# Patient Record
Sex: FEMALE | Race: BLACK OR AFRICAN AMERICAN | Employment: OTHER | URBAN - METROPOLITAN AREA
[De-identification: names, ages, dates, MRNs, and addresses within clinical notes are randomized per-mention and may not be internally consistent; named-entity substitution may affect disease eponyms.]

---

## 2017-01-06 ENCOUNTER — HOSPITAL ENCOUNTER (OUTPATIENT)
Dept: LAB | Age: 72
Discharge: HOME | End: 2017-01-06

## 2017-01-06 LAB
INR PPP: 1.2 (ref 0.9–1.2)
PROTHROMBIN TIME: 13.1 SEC (ref 9.6–12)

## 2017-01-06 PROCEDURE — 85610 PROTHROMBIN TIME: CPT | Performed by: GENERAL PRACTICE

## 2017-01-17 ENCOUNTER — HOSPITAL ENCOUNTER (OUTPATIENT)
Dept: LAB | Age: 72
Discharge: HOME | End: 2017-01-17

## 2017-01-17 LAB
INR PPP: 1.2 (ref 0.9–1.2)
PROTHROMBIN TIME: 13.4 SEC (ref 9.6–12)

## 2017-01-17 PROCEDURE — 85610 PROTHROMBIN TIME: CPT

## 2017-02-08 PROBLEM — R53.81 DEBILITY: Status: ACTIVE | Noted: 2017-02-08

## 2017-02-15 ENCOUNTER — HOSPITAL ENCOUNTER (OUTPATIENT)
Dept: LAB | Age: 72
Discharge: HOME | End: 2017-02-15
Payer: MEDICARE

## 2017-02-15 DIAGNOSIS — I26.09 OTHER ACUTE PULMONARY EMBOLISM WITH ACUTE COR PULMONALE (HCC): ICD-10-CM

## 2017-02-15 DIAGNOSIS — I26.09 OTHER PULMONARY EMBOLISM WITH ACUTE COR PULMONALE, UNSPECIFIED CHRONICITY (HCC): ICD-10-CM

## 2017-02-15 DIAGNOSIS — Z79.01 ANTICOAGULATED ON COUMADIN: ICD-10-CM

## 2017-02-15 DIAGNOSIS — I26.99 OTHER PULMONARY EMBOLISM WITHOUT ACUTE COR PULMONALE, UNSPECIFIED CHRONICITY (HCC): ICD-10-CM

## 2017-02-15 DIAGNOSIS — I82.402 DEEP VEIN THROMBOSIS (DVT) OF LEFT LOWER EXTREMITY, UNSPECIFIED CHRONICITY, UNSPECIFIED VEIN (HCC): ICD-10-CM

## 2017-02-15 DIAGNOSIS — R68.89: ICD-10-CM

## 2017-02-15 LAB
ALBUMIN SERPL BCP-MCNC: 3.2 G/DL (ref 3.2–4.6)
ALBUMIN/GLOB SERPL: 0.7 {RATIO} (ref 1.2–3.5)
ALP SERPL-CCNC: 81 U/L (ref 50–136)
ALT SERPL-CCNC: 12 U/L (ref 12–65)
ANION GAP BLD CALC-SCNC: 9 MMOL/L (ref 7–16)
AST SERPL W P-5'-P-CCNC: 19 U/L (ref 15–37)
BASOPHILS # BLD AUTO: 0 K/UL (ref 0–0.2)
BASOPHILS # BLD: 0 % (ref 0–2)
BILIRUB SERPL-MCNC: 0.3 MG/DL (ref 0.2–1.1)
BUN SERPL-MCNC: 11 MG/DL (ref 8–23)
CALCIUM SERPL-MCNC: 8.6 MG/DL (ref 8.3–10.4)
CHLORIDE SERPL-SCNC: 102 MMOL/L (ref 98–107)
CO2 SERPL-SCNC: 28 MMOL/L (ref 23–32)
CREAT SERPL-MCNC: 0.84 MG/DL (ref 0.6–1)
DIFFERENTIAL METHOD BLD: ABNORMAL
EOSINOPHIL # BLD: 0.1 K/UL (ref 0–0.8)
EOSINOPHIL NFR BLD: 1 % (ref 0.5–7.8)
ERYTHROCYTE [DISTWIDTH] IN BLOOD BY AUTOMATED COUNT: 14 % (ref 11.9–14.6)
GLOBULIN SER CALC-MCNC: 4.5 G/DL (ref 2.3–3.5)
GLUCOSE SERPL-MCNC: 157 MG/DL (ref 65–100)
HCT VFR BLD AUTO: 39.6 % (ref 35.8–46.3)
HGB BLD-MCNC: 12.3 G/DL (ref 11.7–15.4)
INR PPP: 2.2 (ref 0.9–1.2)
LYMPHOCYTES # BLD AUTO: 26 % (ref 13–44)
LYMPHOCYTES # BLD: 2.3 K/UL (ref 0.5–4.6)
MCH RBC QN AUTO: 28.2 PG (ref 26.1–32.9)
MCHC RBC AUTO-ENTMCNC: 31.1 G/DL (ref 31.4–35)
MCV RBC AUTO: 90.8 FL (ref 79.6–97.8)
MONOCYTES # BLD: 1.1 K/UL (ref 0.1–1.3)
MONOCYTES NFR BLD AUTO: 12 % (ref 4–12)
NEUTS SEG # BLD: 5.6 K/UL (ref 1.7–8.2)
NEUTS SEG NFR BLD AUTO: 61 % (ref 43–78)
NRBC # BLD: 0 K/UL (ref 0–0.2)
NRBC BLD-RTO: 0 PER 100 WBC (ref 0–2)
PLATELET # BLD AUTO: 224 K/UL (ref 150–450)
PMV BLD AUTO: 11.3 FL (ref 10.8–14.1)
POTASSIUM SERPL-SCNC: 3.4 MMOL/L (ref 3.5–5.1)
PROT SERPL-MCNC: 7.7 G/DL (ref 6.3–8.2)
PROTHROMBIN TIME: 22 SEC (ref 9.6–12)
RBC # BLD AUTO: 4.36 M/UL (ref 4.05–5.25)
SODIUM SERPL-SCNC: 139 MMOL/L (ref 136–145)
WBC # BLD AUTO: 9.2 K/UL (ref 4.3–11.1)

## 2017-02-15 PROCEDURE — 85025 COMPLETE CBC W/AUTO DIFF WBC: CPT | Performed by: INTERNAL MEDICINE

## 2017-02-15 PROCEDURE — 80053 COMPREHEN METABOLIC PANEL: CPT | Performed by: INTERNAL MEDICINE

## 2017-02-15 PROCEDURE — 85610 PROTHROMBIN TIME: CPT | Performed by: INTERNAL MEDICINE

## 2017-02-15 PROCEDURE — 36415 COLL VENOUS BLD VENIPUNCTURE: CPT | Performed by: INTERNAL MEDICINE

## 2017-02-15 NOTE — PROGRESS NOTES
Pt has been notified to recheck in 4 weeks with Dr. Werner Kunz.  I have forward an encounter to Dr. Werner Kunz

## 2017-02-24 PROBLEM — H60.332 CHRONIC SWIMMER'S EAR OF LEFT SIDE: Status: ACTIVE | Noted: 2017-02-24

## 2017-02-24 PROBLEM — H91.90 HEARING LOSS: Status: ACTIVE | Noted: 2017-02-24

## 2017-02-24 PROBLEM — K02.9 DENTAL CARIES EXTENDING INTO PULP: Status: ACTIVE | Noted: 2017-02-24

## 2017-02-28 ENCOUNTER — HOSPITAL ENCOUNTER (OUTPATIENT)
Dept: ULTRASOUND IMAGING | Age: 72
Discharge: HOME | End: 2017-02-28
Attending: FAMILY MEDICINE
Payer: MEDICARE

## 2017-02-28 DIAGNOSIS — I82.432 DEEP VEIN THROMBOSIS (DVT) OF POPLITEAL VEIN OF LEFT LOWER EXTREMITY, UNSPECIFIED CHRONICITY (HCC): ICD-10-CM

## 2017-02-28 PROCEDURE — 93971 EXTREMITY STUDY: CPT

## 2017-03-03 ENCOUNTER — HOSPITAL ENCOUNTER (OUTPATIENT)
Dept: LAB | Age: 72
Discharge: HOME | End: 2017-03-03
Payer: MEDICARE

## 2017-03-03 DIAGNOSIS — I82.402 DEEP VEIN THROMBOSIS (DVT) OF LEFT LOWER EXTREMITY, UNSPECIFIED CHRONICITY, UNSPECIFIED VEIN (HCC): ICD-10-CM

## 2017-03-03 DIAGNOSIS — I10 ESSENTIAL HYPERTENSION WITH GOAL BLOOD PRESSURE LESS THAN 140/90: Chronic | ICD-10-CM

## 2017-03-03 DIAGNOSIS — E87.6 HYPOKALEMIA: ICD-10-CM

## 2017-03-03 LAB
ANION GAP BLD CALC-SCNC: 12 MMOL/L (ref 7–16)
BUN SERPL-MCNC: 11 MG/DL (ref 8–23)
CALCIUM SERPL-MCNC: 8.5 MG/DL (ref 8.3–10.4)
CHLORIDE SERPL-SCNC: 106 MMOL/L (ref 98–107)
CO2 SERPL-SCNC: 27 MMOL/L (ref 21–32)
CREAT SERPL-MCNC: 0.81 MG/DL (ref 0.6–1)
GLUCOSE SERPL-MCNC: 88 MG/DL (ref 65–100)
INR PPP: 3.2 (ref 0.9–1.2)
POTASSIUM SERPL-SCNC: 3.9 MMOL/L (ref 3.5–5.1)
PROTHROMBIN TIME: 35.3 SEC (ref 9.6–12)
SODIUM SERPL-SCNC: 145 MMOL/L (ref 136–145)

## 2017-03-03 PROCEDURE — 85610 PROTHROMBIN TIME: CPT | Performed by: FAMILY MEDICINE

## 2017-03-03 PROCEDURE — 36415 COLL VENOUS BLD VENIPUNCTURE: CPT | Performed by: FAMILY MEDICINE

## 2017-03-03 PROCEDURE — 80048 BASIC METABOLIC PNL TOTAL CA: CPT | Performed by: FAMILY MEDICINE

## 2017-03-03 NOTE — PROGRESS NOTES
Called patient and informed to hold on coumadin today and resume 4mg starting tomorrow. Then patient will see Dr. Sixto Summers on 3/8/17 and he will take over PT INR. Patient expressed understanding.

## 2017-03-08 ENCOUNTER — HOSPITAL ENCOUNTER (OUTPATIENT)
Dept: LAB | Age: 72
Discharge: HOME | End: 2017-03-08
Payer: MEDICARE

## 2017-03-08 DIAGNOSIS — I82.402 DEEP VEIN THROMBOSIS (DVT) OF LEFT LOWER EXTREMITY, UNSPECIFIED CHRONICITY, UNSPECIFIED VEIN (HCC): ICD-10-CM

## 2017-03-08 LAB
BASOPHILS # BLD AUTO: 0 K/UL (ref 0–0.2)
BASOPHILS # BLD: 1 % (ref 0–2)
DIFFERENTIAL METHOD BLD: ABNORMAL
EOSINOPHIL # BLD: 0.1 K/UL (ref 0–0.8)
EOSINOPHIL NFR BLD: 2 % (ref 0.5–7.8)
ERYTHROCYTE [DISTWIDTH] IN BLOOD BY AUTOMATED COUNT: 13.5 % (ref 11.9–14.6)
HCT VFR BLD AUTO: 40 % (ref 35.8–46.3)
HGB BLD-MCNC: 12.4 G/DL (ref 11.7–15.4)
INR PPP: 2.4 (ref 0.9–1.2)
LYMPHOCYTES # BLD AUTO: 44 % (ref 13–44)
LYMPHOCYTES # BLD: 2.7 K/UL (ref 0.5–4.6)
MCH RBC QN AUTO: 27.7 PG (ref 26.1–32.9)
MCHC RBC AUTO-ENTMCNC: 31 G/DL (ref 31.4–35)
MCV RBC AUTO: 89.3 FL (ref 79.6–97.8)
MONOCYTES # BLD: 0.7 K/UL (ref 0.1–1.3)
MONOCYTES NFR BLD AUTO: 11 % (ref 4–12)
NEUTS SEG # BLD: 2.6 K/UL (ref 1.7–8.2)
NEUTS SEG NFR BLD AUTO: 43 % (ref 43–78)
NRBC # BLD: 0 K/UL (ref 0–0.2)
PLATELET # BLD AUTO: 164 K/UL (ref 150–450)
PMV BLD AUTO: 11.9 FL (ref 10.8–14.1)
PROTHROMBIN TIME: 28.3 SEC (ref 9.6–12)
RBC # BLD AUTO: 4.48 M/UL (ref 4.05–5.25)
WBC # BLD AUTO: 6.1 K/UL (ref 4.3–11.1)

## 2017-03-08 PROCEDURE — 85025 COMPLETE CBC W/AUTO DIFF WBC: CPT | Performed by: INTERNAL MEDICINE

## 2017-03-08 PROCEDURE — 36415 COLL VENOUS BLD VENIPUNCTURE: CPT | Performed by: INTERNAL MEDICINE

## 2017-03-08 PROCEDURE — 85610 PROTHROMBIN TIME: CPT | Performed by: INTERNAL MEDICINE

## 2017-03-22 ENCOUNTER — HOSPITAL ENCOUNTER (OUTPATIENT)
Dept: LAB | Age: 72
Discharge: HOME | End: 2017-03-22
Payer: MEDICARE

## 2017-03-22 DIAGNOSIS — I82.402 DEEP VEIN THROMBOSIS (DVT) OF LEFT LOWER EXTREMITY, UNSPECIFIED CHRONICITY, UNSPECIFIED VEIN (HCC): ICD-10-CM

## 2017-03-22 LAB
INR PPP: 1.7 (ref 0.9–1.2)
PROTHROMBIN TIME: 20.3 SEC (ref 9.6–12)

## 2017-03-22 PROCEDURE — 85610 PROTHROMBIN TIME: CPT | Performed by: INTERNAL MEDICINE

## 2017-03-22 PROCEDURE — 36416 COLLJ CAPILLARY BLOOD SPEC: CPT | Performed by: INTERNAL MEDICINE

## 2017-03-28 ENCOUNTER — HOSPITAL ENCOUNTER (OUTPATIENT)
Dept: GENERAL RADIOLOGY | Age: 72
Discharge: HOME | End: 2017-03-28
Payer: MEDICARE

## 2017-03-28 DIAGNOSIS — M25.511 BILATERAL SHOULDER PAIN, UNSPECIFIED CHRONICITY: ICD-10-CM

## 2017-03-28 DIAGNOSIS — M25.512 BILATERAL SHOULDER PAIN, UNSPECIFIED CHRONICITY: ICD-10-CM

## 2017-03-28 PROBLEM — M54.40 CHRONIC RIGHT-SIDED LOW BACK PAIN WITH SCIATICA: Status: ACTIVE | Noted: 2017-03-28

## 2017-03-28 PROBLEM — G89.29 CHRONIC RIGHT-SIDED LOW BACK PAIN WITH SCIATICA: Status: ACTIVE | Noted: 2017-03-28

## 2017-03-28 PROCEDURE — 73030 X-RAY EXAM OF SHOULDER: CPT

## 2017-03-30 ENCOUNTER — HOSPITAL ENCOUNTER (OUTPATIENT)
Dept: LAB | Age: 72
Discharge: HOME | End: 2017-03-30
Payer: MEDICARE

## 2017-03-30 DIAGNOSIS — I26.09 OTHER PULMONARY EMBOLISM WITH ACUTE COR PULMONALE, UNSPECIFIED CHRONICITY (HCC): ICD-10-CM

## 2017-03-30 LAB
INR PPP: 1.7 (ref 0.9–1.2)
PROTHROMBIN TIME: 20 SEC (ref 9.6–12)

## 2017-03-30 PROCEDURE — 36416 COLLJ CAPILLARY BLOOD SPEC: CPT | Performed by: INTERNAL MEDICINE

## 2017-03-30 PROCEDURE — 85610 PROTHROMBIN TIME: CPT | Performed by: INTERNAL MEDICINE

## 2017-04-06 ENCOUNTER — HOSPITAL ENCOUNTER (OUTPATIENT)
Dept: LAB | Age: 72
Discharge: HOME | End: 2017-04-06
Payer: MEDICARE

## 2017-04-06 DIAGNOSIS — I26.09 OTHER PULMONARY EMBOLISM WITH ACUTE COR PULMONALE, UNSPECIFIED CHRONICITY (HCC): ICD-10-CM

## 2017-04-06 LAB
INR PPP: 2 (ref 0.9–1.2)
PROTHROMBIN TIME: 24.1 SEC (ref 9.6–12)

## 2017-04-06 PROCEDURE — 36416 COLLJ CAPILLARY BLOOD SPEC: CPT | Performed by: INTERNAL MEDICINE

## 2017-04-06 PROCEDURE — 85610 PROTHROMBIN TIME: CPT | Performed by: INTERNAL MEDICINE

## 2017-04-17 ENCOUNTER — HOSPITAL ENCOUNTER (OUTPATIENT)
Dept: LAB | Age: 72
Discharge: HOME | End: 2017-04-17
Payer: MEDICARE

## 2017-04-17 DIAGNOSIS — I26.09 OTHER PULMONARY EMBOLISM WITH ACUTE COR PULMONALE, UNSPECIFIED CHRONICITY (HCC): ICD-10-CM

## 2017-04-17 LAB
INR PPP: 1.9 (ref 0.9–1.2)
PROTHROMBIN TIME: 23.1 SEC (ref 9.6–12)

## 2017-04-17 PROCEDURE — 85610 PROTHROMBIN TIME: CPT | Performed by: INTERNAL MEDICINE

## 2017-04-17 PROCEDURE — 36416 COLLJ CAPILLARY BLOOD SPEC: CPT | Performed by: INTERNAL MEDICINE

## 2017-04-18 ENCOUNTER — HOSPITAL ENCOUNTER (OUTPATIENT)
Dept: GENERAL RADIOLOGY | Age: 72
Discharge: HOME | End: 2017-04-18
Payer: MEDICARE

## 2017-04-18 DIAGNOSIS — R10.9 RIGHT SIDED ABDOMINAL PAIN: ICD-10-CM

## 2017-04-18 PROBLEM — Z79.4 TYPE 2 DIABETES MELLITUS WITH COMPLICATION, WITH LONG-TERM CURRENT USE OF INSULIN (HCC): Status: ACTIVE | Noted: 2017-04-18

## 2017-04-18 PROBLEM — E11.8 TYPE 2 DIABETES MELLITUS WITH COMPLICATION, WITH LONG-TERM CURRENT USE OF INSULIN (HCC): Status: ACTIVE | Noted: 2017-04-18

## 2017-04-18 PROCEDURE — 72072 X-RAY EXAM THORAC SPINE 3VWS: CPT

## 2017-04-18 PROCEDURE — 72100 X-RAY EXAM L-S SPINE 2/3 VWS: CPT

## 2017-04-18 NOTE — PROGRESS NOTES
She has just mild arthritis in her spine. Have her call her GI doctor about this pain in her abdomen and side and make an appointment in the near future with her GI doctor.

## 2017-04-25 ENCOUNTER — HOSPITAL ENCOUNTER (OUTPATIENT)
Dept: LAB | Age: 72
Discharge: HOME | End: 2017-04-25
Payer: MEDICARE

## 2017-04-25 DIAGNOSIS — I82.402 DEEP VEIN THROMBOSIS (DVT) OF LEFT LOWER EXTREMITY, UNSPECIFIED CHRONICITY, UNSPECIFIED VEIN (HCC): ICD-10-CM

## 2017-04-25 DIAGNOSIS — I26.09 OTHER PULMONARY EMBOLISM WITH ACUTE COR PULMONALE, UNSPECIFIED CHRONICITY (HCC): ICD-10-CM

## 2017-04-25 LAB
BASOPHILS # BLD AUTO: 0 K/UL (ref 0–0.2)
BASOPHILS # BLD: 0 % (ref 0–2)
DIFFERENTIAL METHOD BLD: ABNORMAL
EOSINOPHIL # BLD: 0.1 K/UL (ref 0–0.8)
EOSINOPHIL NFR BLD: 2 % (ref 0.5–7.8)
ERYTHROCYTE [DISTWIDTH] IN BLOOD BY AUTOMATED COUNT: 13.1 % (ref 11.9–14.6)
HCT VFR BLD AUTO: 35.7 % (ref 35.8–46.3)
HGB BLD-MCNC: 11.3 G/DL (ref 11.7–15.4)
INR PPP: 2.4 (ref 0.9–1.2)
LYMPHOCYTES # BLD AUTO: 44 % (ref 13–44)
LYMPHOCYTES # BLD: 2.8 K/UL (ref 0.5–4.6)
MCH RBC QN AUTO: 27.4 PG (ref 26.1–32.9)
MCHC RBC AUTO-ENTMCNC: 31.7 G/DL (ref 31.4–35)
MCV RBC AUTO: 86.4 FL (ref 79.6–97.8)
MONOCYTES # BLD: 0.9 K/UL (ref 0.1–1.3)
MONOCYTES NFR BLD AUTO: 14 % (ref 4–12)
NEUTS SEG # BLD: 2.6 K/UL (ref 1.7–8.2)
NEUTS SEG NFR BLD AUTO: 41 % (ref 43–78)
NRBC # BLD: 0 K/UL (ref 0–0.2)
PLATELET # BLD AUTO: 232 K/UL (ref 150–450)
PMV BLD AUTO: 11.7 FL (ref 10.8–14.1)
PROTHROMBIN TIME: 28.8 SEC (ref 9.6–12)
RBC # BLD AUTO: 4.13 M/UL (ref 4.05–5.25)
WBC # BLD AUTO: 6.4 K/UL (ref 4.3–11.1)

## 2017-04-25 PROCEDURE — 85025 COMPLETE CBC W/AUTO DIFF WBC: CPT | Performed by: INTERNAL MEDICINE

## 2017-04-25 PROCEDURE — 36415 COLL VENOUS BLD VENIPUNCTURE: CPT | Performed by: INTERNAL MEDICINE

## 2017-04-25 PROCEDURE — 85610 PROTHROMBIN TIME: CPT | Performed by: INTERNAL MEDICINE

## 2017-05-08 ENCOUNTER — HOSPITAL ENCOUNTER (OUTPATIENT)
Dept: LAB | Age: 72
Discharge: HOME | End: 2017-05-08
Payer: MEDICARE

## 2017-05-08 DIAGNOSIS — I26.09 OTHER PULMONARY EMBOLISM WITH ACUTE COR PULMONALE, UNSPECIFIED CHRONICITY (HCC): ICD-10-CM

## 2017-05-08 LAB
INR PPP: 2.3 (ref 0.9–1.2)
PROTHROMBIN TIME: 27.6 SEC (ref 9.6–12)

## 2017-05-08 PROCEDURE — 85610 PROTHROMBIN TIME: CPT | Performed by: INTERNAL MEDICINE

## 2017-05-08 PROCEDURE — 36416 COLLJ CAPILLARY BLOOD SPEC: CPT | Performed by: INTERNAL MEDICINE

## 2017-05-22 ENCOUNTER — HOSPITAL ENCOUNTER (OUTPATIENT)
Dept: LAB | Age: 72
Discharge: HOME | End: 2017-05-22
Payer: MEDICARE

## 2017-05-22 DIAGNOSIS — D69.6 THROMBOCYTOPENIA (HCC): ICD-10-CM

## 2017-05-22 LAB
INR PPP: 2.2 (ref 0.9–1.2)
PROTHROMBIN TIME: 26.3 SEC (ref 9.6–12)

## 2017-05-22 PROCEDURE — 85610 PROTHROMBIN TIME: CPT | Performed by: INTERNAL MEDICINE

## 2017-05-22 PROCEDURE — 36416 COLLJ CAPILLARY BLOOD SPEC: CPT | Performed by: INTERNAL MEDICINE

## 2017-06-12 ENCOUNTER — HOSPITAL ENCOUNTER (OUTPATIENT)
Dept: LAB | Age: 72
Discharge: HOME | End: 2017-06-12
Payer: MEDICARE

## 2017-06-12 DIAGNOSIS — I82.402 DEEP VEIN THROMBOSIS (DVT) OF LEFT LOWER EXTREMITY, UNSPECIFIED CHRONICITY, UNSPECIFIED VEIN (HCC): ICD-10-CM

## 2017-06-12 LAB
ALBUMIN SERPL BCP-MCNC: 3.1 G/DL (ref 3.2–4.6)
ALBUMIN/GLOB SERPL: 0.6 {RATIO} (ref 1.2–3.5)
ALP SERPL-CCNC: 107 U/L (ref 50–136)
ALT SERPL-CCNC: 16 U/L (ref 12–65)
ANION GAP BLD CALC-SCNC: 9 MMOL/L (ref 7–16)
AST SERPL W P-5'-P-CCNC: 17 U/L (ref 15–37)
BASOPHILS # BLD AUTO: 0 K/UL (ref 0–0.2)
BASOPHILS # BLD: 0 % (ref 0–2)
BILIRUB SERPL-MCNC: 0.4 MG/DL (ref 0.2–1.1)
BUN SERPL-MCNC: 20 MG/DL (ref 8–23)
CALCIUM SERPL-MCNC: 8.6 MG/DL (ref 8.3–10.4)
CHLORIDE SERPL-SCNC: 107 MMOL/L (ref 98–107)
CO2 SERPL-SCNC: 25 MMOL/L (ref 21–32)
CREAT SERPL-MCNC: 0.95 MG/DL (ref 0.6–1)
DIFFERENTIAL METHOD BLD: ABNORMAL
EOSINOPHIL # BLD: 0.2 K/UL (ref 0–0.8)
EOSINOPHIL NFR BLD: 3 % (ref 0.5–7.8)
ERYTHROCYTE [DISTWIDTH] IN BLOOD BY AUTOMATED COUNT: 14.1 % (ref 11.9–14.6)
GLOBULIN SER CALC-MCNC: 4.8 G/DL (ref 2.3–3.5)
GLUCOSE SERPL-MCNC: 111 MG/DL (ref 65–100)
HCT VFR BLD AUTO: 35.5 % (ref 35.8–46.3)
HGB BLD-MCNC: 11.4 G/DL (ref 11.7–15.4)
INR PPP: 1.8 (ref 0.9–1.2)
LYMPHOCYTES # BLD AUTO: 42 % (ref 13–44)
LYMPHOCYTES # BLD: 2.5 K/UL (ref 0.5–4.6)
MCH RBC QN AUTO: 26.8 PG (ref 26.1–32.9)
MCHC RBC AUTO-ENTMCNC: 32.1 G/DL (ref 31.4–35)
MCV RBC AUTO: 83.3 FL (ref 79.6–97.8)
MONOCYTES # BLD: 0.6 K/UL (ref 0.1–1.3)
MONOCYTES NFR BLD AUTO: 11 % (ref 4–12)
NEUTS SEG # BLD: 2.6 K/UL (ref 1.7–8.2)
NEUTS SEG NFR BLD AUTO: 44 % (ref 43–78)
NRBC # BLD: 0 K/UL (ref 0–0.2)
PLATELET # BLD AUTO: 246 K/UL (ref 150–450)
PMV BLD AUTO: 11.2 FL (ref 10.8–14.1)
POTASSIUM SERPL-SCNC: 3.4 MMOL/L (ref 3.5–5.1)
PROT SERPL-MCNC: 7.9 G/DL (ref 6.3–8.2)
PROTHROMBIN TIME: 21.8 SEC (ref 9.6–12)
RBC # BLD AUTO: 4.26 M/UL (ref 4.05–5.25)
SODIUM SERPL-SCNC: 141 MMOL/L (ref 136–145)
WBC # BLD AUTO: 5.9 K/UL (ref 4.3–11.1)

## 2017-06-12 PROCEDURE — 36415 COLL VENOUS BLD VENIPUNCTURE: CPT | Performed by: INTERNAL MEDICINE

## 2017-06-12 PROCEDURE — 85610 PROTHROMBIN TIME: CPT | Performed by: INTERNAL MEDICINE

## 2017-06-12 PROCEDURE — 85025 COMPLETE CBC W/AUTO DIFF WBC: CPT | Performed by: INTERNAL MEDICINE

## 2017-06-12 PROCEDURE — 80053 COMPREHEN METABOLIC PANEL: CPT | Performed by: INTERNAL MEDICINE

## 2017-07-27 ENCOUNTER — HOSPITAL ENCOUNTER (OUTPATIENT)
Dept: PHYSICAL THERAPY | Age: 72
Discharge: HOME | End: 2017-07-27
Attending: NURSE PRACTITIONER
Payer: MEDICARE

## 2017-07-27 DIAGNOSIS — D64.9 CHRONIC ANEMIA: Chronic | ICD-10-CM

## 2017-07-27 DIAGNOSIS — Z91.81 AT RISK FOR FALLS: ICD-10-CM

## 2017-07-27 PROCEDURE — 97161 PT EVAL LOW COMPLEX 20 MIN: CPT

## 2017-07-27 PROCEDURE — 97162 PT EVAL MOD COMPLEX 30 MIN: CPT

## 2017-07-27 PROCEDURE — G8979 MOBILITY GOAL STATUS: HCPCS

## 2017-07-27 PROCEDURE — G8978 MOBILITY CURRENT STATUS: HCPCS

## 2017-07-27 NOTE — PROGRESS NOTES
Tressa Curiel  : [facility] [address]  [phone]   [phone]          OUTPATIENT PHYSICAL THERAPY:Initial Assessment 2017    ICD-10: Treatment Diagnosis: Other abnormalities of gait and mobility (R26.89); Precautions/Allergies:   Heparin (porcine); Lovenox [enoxaparin]; and Sulfa (sulfonamide antibiotics)   Fall Risk Score: 1 (? 5 = High Risk)  MD Orders: PT eval/falls risk MEDICAL/REFERRING DIAGNOSIS:  At risk for falls [Z91.81]   DATE OF ONSET: 10/2016  REFERRING PHYSICIAN: Dafne Devi NP  RETURN PHYSICIAN APPOINTMENT: unknown  DATE OF PROGRESS NOTE:    RECERTIFICATION DATE:      INITIAL ASSESSMENT:  Ms. Sun Pérez presents with some functional weakness and decreased confidence in her balance. Pt will benefit from strengthening and high level balance and gait routine to maximize independence and safety with walking and mobility    PROBLEM LIST (Impacting functional limitations):  1. Decreased Strength  2. Decreased Ambulation Ability/Technique  3. Decreased Balance  4. Decreased Activity Tolerance  5. Increased Fatigue  6. Decreased Flexibility/Joint Mobility  7. Decreased Emery with Home Exercise Program INTERVENTIONS PLANNED:  1. Balance Exercise  2. Gait Training  3. Home Exercise Program (HEP)  4. Range of Motion (ROM)  5. Therapeutic Activites  6. Therapeutic Exercise/Strengthening   TREATMENT PLAN:  Effective Dates: 17 TO 10/22/17. Frequency/Duration: 2 times a week for 3 weeks  GOALS: (Goals have been discussed and agreed upon with patient.)  Short-Term Functional Goals: Time Frame: 6 visits  1. Pt will increase 6 minute walk time by 200 feet indicating increased functional strength and gait ability and decrease risk for fall. 2. Pt will increase Wei Balance Scale by 2 points indicating increased static standing balance and decreased risk for fall  3.  Pt will decrease her TUG time by 2 seconds indicating increase gait ability and decreased risk for fall. Rehabilitation Potential For Stated Goals: Good  Regarding Alice Byrd's therapy, I certify that the treatment plan above will be carried out by a therapist or under their direction. Thank you for this referral,  Noah Dodd, PT     Referring Physician Signature: Maude Rebollar NP              Date                    HISTORY:   GOAL:  Walk with better balance and get rid of the cane. Present Symptoms:    66YEAR OLD [place] FEMALE. fELL ABOUT 6 TIMES WHEN FIRST CAME HERE IN 2011. Last fall was beginning of last year I have ulcerative collitus and my BP dropped. Using cane went into the hospital for ulcerative collitis in Fall 2016 and in and out through the New Year. February to [facility] for rehab and then went. Then some HH therapy through ~April. Uses cane in house at times. When my stomach is acting up I feel the weakest.  Lives with  - good health. Blind in left eye since 4years old  PAIN:  Some has to do with the way I sleep every now and then. A pian in my right hip and down lateral leg. I f I sit or stand or walk too much but the IT band area will hurt and is kind of numb. History of Present Injury/Illness (Reason for Referral):  As above. Past Medical History/Comorbidities:   Ms. Robert Mujica  has a past medical history of Abnormal cardiovascular function study; Acute cholecystitis (12/17/2013); Allergic rhinitis (9/27/2012); Allergic rhinitis due to allergen (7/1/2015); Anemia (9/27/2012); Atrial fibrillation ([address] blindness to left eye; Chronic anemia (7/1/2015); Chronic kidney disease, stage III (moderate) (9/27/2012); Crohn's disease ([address] Diabetes ([address] DM2 (diabetes mellitus, type 2) ([address] (9/27/2012); Essential hypertension (7/1/2015); Gastroesophageal reflux disease without esophagitis (7/1/2015); GERD (gastroesophageal reflux disease) (9/27/2012);  Heart murmur; HLD (hyperlipidemia) (9/27/2012); HTN (hypertension); Hypokalemia (9/27/2012); Hypothyroidism; Hypothyroidism (9/27/2012); Hypothyroidism due to medication (7/1/2015); Inflammatory bowel disease (7/1/2015); Palpitations; Personal history of asthma-as a child. (8/24/2016); Type 2 diabetes mellitus without complication ([facility] (3/0/2884); and Ulcerative colitis ([facility] Ms. Tonya Osuna  has a past surgical history that includes partial thyroidectomy; orthopaedic; carpal tunnel release; cataract removal; heent; and lap cholecystectomy. Past Surgical History:   Procedure Laterality Date    HX CARPAL TUNNEL RELEASE      HX CATARACT REMOVAL      to L    HX HEENT      shunt to L eye r/t glaucoma    HX LAP CHOLECYSTECTOMY      HX ORTHOPAEDIC      to back    HX PARTIAL THYROIDECTOMY       Social History/Living Environment:     lives with spouse. Walking in the neighborhood in a large [employer] parking lot for about 20 minutes about 2 times per week. Prior Level of Function/Work/Activity:    Current Medications:    Current Outpatient Prescriptions:     clonazePAM (KLONOPIN) 0.5 mg tablet, 0.25mg tab every morning and take 0.25 mg daily as needed, Disp: 30 Tab, Rfl: 2    INFLIXIMAB (REMICADE IV), by IntraVENous route., Disp: , Rfl:     NOVOLOG 100 unit/mL injection, INJECT 6 UNITS UNDER THE SKIN THREE TIMES DAILY WITH BREAKFAST, LUNCH, AND DINNER, Disp: 10 mL, Rfl: 0    magnesium oxide (MAG-OX) 400 mg tablet, Take 1 Tab by mouth daily. , Disp: 90 Tab, Rfl: 0    BD SINGLE USE SWABS REGULAR padm, USE AS DIRECTED, Disp: , Rfl: 1    levothyroxine (SYNTHROID) 50 mcg tablet, Take 1 Tab by mouth Daily (before breakfast). , Disp: 90 Tab, Rfl: 1    dicyclomine (BENTYL) 20 mg tablet, Take 1 Tab by mouth two (2) times a day., Disp: 60 Tab, Rfl: 2    budesonide-formoterol (SYMBICORT) 160-4.5 mcg/actuation HFA inhaler, Take 2 Puffs by inhalation two (2) times a day., Disp: 1 Inhaler, Rfl: 11    glucose blood VI test strips (FREESTYLE TEST) strip, Test blood sugar QID Dx E11.69, Disp: 120 Strip, Rfl: 5    amLODIPine (NORVASC) 10 mg tablet, Take 1 Tab by mouth daily. , Disp: 90 Tab, Rfl: 3    FREESTYLE LITE METER monitoring kit, TEST BLOOD SUGAR QID BEFORE MEALS AND NIGHTLY, Disp: , Rfl: 2    ONE TOUCH DELICA 33 gauge misc, TEST BLOOD SUGAR BEFORE EACH MEAL AND HS, Disp: , Rfl: 0    FREESTYLE LANCETS 28 gauge misc, TEST BLOOD SUGAR QID BEFORE MEALS AND NIGHTLY, Disp: , Rfl: 2    Insulin Syringe-Needle U-100 0.3 mL 31 gauge x 5/16 syrg, U UTD WITH INSULIN, Disp: , Rfl: 5    glucose blood VI test strips (BLOOD GLUCOSE TEST) strip, by Does Not Apply route., Disp: , Rfl:     BD INSULIN PEN NEEDLE UF MINI 31 gauge x 3/16\" ndle, USE ONE DAILY, Disp: , Rfl: 1    valsartan (DIOVAN) 320 mg tablet, TAKE 1 TABLET BY MOUTH EVERY DAY, Disp: 90 Tab, Rfl: 1    atorvastatin (LIPITOR) 80 mg tablet, TAKE 1 TABLET BY MOUTH EVERY DAY, Disp: 90 Tab, Rfl: 1    potassium chloride (KLOR-CON M20) 20 mEq tablet, Take 1 Tab by mouth daily. , Disp: 90 Tab, Rfl: 3    insulin glargine (LANTUS SOLOSTAR) 100 unit/mL (3 mL) pen, 10 Units by SubCUTAneous route nightly., Disp: 1 Each, Rfl: 11    KETOTIFEN FUMARATE (EYE ITCH RELIEF OP), Apply  to eye., Disp: , Rfl:     albuterol (PROVENTIL HFA, VENTOLIN HFA, PROAIR HFA) 90 mcg/actuation inhaler, Take 2 Puffs by inhalation every four (4) hours as needed for Wheezing., Disp: 1 Inhaler, Rfl: 0    fluticasone (FLONASE) 50 mcg/actuation nasal spray, 2 Sprays by Both Nostrils route daily. , Disp: 1 Bottle, Rfl: 11   Date Last Reviewed:  7/287/17   Number of Personal Factors/Comorbidities that affect the Plan of Care: 0: LOW COMPLEXITY   EXAMINATION:   ROM:          WFL - slight tightness in full range of shoulder flexion. Sometimes stiff in hands. Strength:          Shoulders 3+/5. Rest ok.   Functional Mobility:         Gait/Ambulation:  Pt ambulates with a straight cane with a slight narrow JAVID with out going toes and feet.  Pt looks down. Pt walks with less confidence without the cane. Pt wears small wedge heel that are more narrow than her heel but she has been doing this for many years and managed decently during testing. Transfers:  independent        Bed Mobility:  NT  Mental Status:          Alert and oriented x 4;  Pleasant and appropriate. Vision:          Wears bifocal.     Body Structures Involved:  1. Eyes and Ears  2. Bones  3. Joints  4. Muscles  5. Ligaments Body Functions Affected:  1. Neuromusculoskeletal  2. Movement Related Activities and Participation Affected:  1. Learning and Applying Knowledge  2. General Tasks and Demands  3. Mobility  4. Domestic Life  5. Interpersonal Interactions and Relationships  6. Community, Social and [place]   Number of elements that affect the Plan of Care: 4+: HIGH COMPLEXITY   CLINICAL PRESENTATION:   Presentation: Stable and uncomplicated: LOW COMPLEXITY   CLINICAL DECISION MAKING:   Outcome Measure: Tool Used: 6-Minute Walk Test   Score:  Initial: 600 Feet = TBD Meters  Most Recent: TBD Feet = TBD Meters    Interpretation of Score: AGE  GENDER  MEAN  SD  NORMAL RANGE (2SD)    61-76  Male (15)   Female (22)  572   538  80   80  1-0   [phone]    66-77  Male (14)   Female (22)  527   471  80   69  46-65   [phone]    80-80  Male (8)   Female (73)  811   298  13   57  [phone]   [id number]        Tool Used: Nancieil Settle Balance Scale  Score:  Initial: 51/56 Most Recent: X/56 (Date: -- )   Interpretation of Score: Each section is scored on a 0-4 scale, 0 representing the patients inability to perform the task and 4 representing independence. The scores of each section are added together for a total score of 56. The higher the patients score, the more independent the patient is. Any score below 45 indicates increased risk for falls.     Score 56 55-45 44-34 33-23 22-12 11-1 0   Modifier CH CI CJ CK CL CM CN     Tool Used: Timed Up and Go (TUG)  Score:  Initial: 15.31 seconds Most Recent: X seconds (Date: -- )   Interpretation of Score: The test measures, in seconds, the time taken by an individual to stand up from a standard arm chair (seat height 46 cm [18 in], arm height 65 cm [25.6 in]), walk a distance of 3 meters (118 in, approx 10 ft), turn, walk back to the chair and sit down. If the individual takes longer than 14 seconds to complete TUG, this indicates risk for falls. Score 7 7.5-10.5 11-14 14.5-17.5 18-21 21.5-24.5 25+   Modifier CH CI CJ CK CL CM CN     ? Mobility - Walking and Moving Around:     - CURRENT STATUS: CK - 40%-59% impaired, limited or restricted    - GOAL STATUS: CJ - 20%-39% impaired, limited or restricted    - D/C STATUS:  ---------------To be determined---------------      Medical Necessity:   · Skilled intervention continues to be required due to DEBILITY. Reason for Services/Other Comments:  · Patient continues to require skilled intervention due to DEBILITY. Use of outcome tool(s) and clinical judgement create a POC that gives a: Clear prediction of patient's progress: LOW COMPLEXITY   TREATMENT:   (In addition to Assessment/Re-Assessment sessions the following treatments were rendered)    ASSESSMENT    Treatment/Session Assessment:  See initial assessment above. Patients response to todays treatment session was tolerated well with no medical complications. .  · Post session pain:  NONE  · Compliance with Program/Exercises: Will assess as treatment progresses. · Recommendations/Intent for next treatment session: \"Next visit will focus on advancements to more challenging activities\".   Total Treatment Duration:  PT Patient Time In/Time Out  Time In: 1000  Time Out: [address], [place]

## 2017-07-28 NOTE — PROGRESS NOTES
Ambulatory/Rehab Services H2 Model Falls Risk Assessment    Risk Factor Pts. ·   Confusion/Disorientation/Impulsivity  []    4 ·   Symptomatic Depression  []   2 ·   Altered Elimination  []   1 ·   Dizziness/Vertigo  []   1 ·   Gender (Male)  []   1 ·   Any administered antiepileptics (anticonvulsants):  []   2 ·   Any administered benzodiazepines:  []   1 ·   Visual Impairment (specify):  [x]   1 ·   Portable Oxygen Use  []   1 ·   Orthostatic ? BP  []   1 ·   History of Recent Falls (within 3 mos.)  []   5     Ability to Rise from Chair (choose one) Pts. ·   Ability to rise in a single movement  []   0 ·   Pushes up, successful in one attempt  []   1 ·   Multiple attempts, but successful  []   3 ·   Unable to rise without assistance  []   4   Total: (5 or greater = High Risk) 1     Falls Prevention Plan:   []                Physical Limitations to Exercise (specify):   []                Mobility Assistance Device (type):   []                Exercise/Equipment Adaptation (specify):    ©2010 [facility] [address] Patent #[id number].  Federal Law prohibits the replication, distribution or use without written permission from [facility] [employer]

## 2017-08-03 ENCOUNTER — HOSPITAL ENCOUNTER (OUTPATIENT)
Dept: PHYSICAL THERAPY | Age: 72
Discharge: HOME | End: 2017-08-03
Attending: NURSE PRACTITIONER
Payer: MEDICARE

## 2017-08-03 PROCEDURE — 97140 MANUAL THERAPY 1/> REGIONS: CPT

## 2017-08-03 PROCEDURE — 97110 THERAPEUTIC EXERCISES: CPT

## 2017-08-03 NOTE — PROGRESS NOTES
Gabriel Juarez  : [facility] [address]  [phone]   [phone]          OUTPATIENT PHYSICAL THERAPY:Initial Assessment 2017    ICD-10: Treatment Diagnosis: Other abnormalities of gait and mobility (R26.89); Precautions/Allergies:   Heparin (porcine); Lovenox [enoxaparin]; and Sulfa (sulfonamide antibiotics)   Fall Risk Score: 1 (? 5 = High Risk)  MD Orders: PT eval/falls risk MEDICAL/REFERRING DIAGNOSIS:  At risk for falls [Z91.81]   DATE OF ONSET: 10/2016  REFERRING PHYSICIAN: Shamir Devi NP  RETURN PHYSICIAN APPOINTMENT: unknown  DATE OF PROGRESS NOTE:    RECERTIFICATION DATE:      INITIAL ASSESSMENT:  Ms. Tonya Osuna presents with some functional weakness and decreased confidence in her balance. Pt will benefit from strengthening and high level balance and gait routine to maximize independence and safety with walking and mobility    PROBLEM LIST (Impacting functional limitations):  1. Decreased Strength  2. Decreased Ambulation Ability/Technique  3. Decreased Balance  4. Decreased Activity Tolerance  5. Increased Fatigue  6. Decreased Flexibility/Joint Mobility  7. Decreased [place] with Home Exercise Program INTERVENTIONS PLANNED:  1. Balance Exercise  2. Gait Training  3. Home Exercise Program (HEP)  4. Range of Motion (ROM)  5. Therapeutic Activites  6. Therapeutic Exercise/Strengthening   TREATMENT PLAN:  Effective Dates: 17 TO 10/22/17. Frequency/Duration: 2 times a week for 3 weeks  GOALS: (Goals have been discussed and agreed upon with patient.)  Short-Term Functional Goals: Time Frame: 6 visits  1. Pt will increase 6 minute walk time by 200 feet indicating increased functional strength and gait ability and decrease risk for fall. 2. Pt will increase Wei Balance Scale by 2 points indicating increased static standing balance and decreased risk for fall  3.  Pt will decrease her TUG time by 2 seconds indicating increase gait ability and decreased risk for fall. Rehabilitation Potential For Stated Goals: Good  Regarding Alice Byrd's therapy, I certify that the treatment plan above will be carried out by a therapist or under their direction. Thank you for this referral,  Uriah Stone PTA     Referring Physician Signature: Damir Muse NP              Date                    HISTORY:   GOAL:  Walk with better balance and get rid of the cane. Present Symptoms:    66YEAR OLD [place] FEMALE. fELL ABOUT 6 TIMES WHEN FIRST CAME HERE IN 2011. Last fall was beginning of last year I have ulcerative collitus and my BP dropped. Using cane went into the hospital for ulcerative collitis in Fall 2016 and in and out through the New Year. February to [facility] for rehab and then went. Then some  therapy through ~April. Uses cane in house at times. When my stomach is acting up I feel the weakest.  Lives with  - good health. Blind in left eye since 4years old  PAIN:  Some has to do with the way I sleep every now and then. A pian in my right hip and down lateral leg. I f I sit or stand or walk too much but the IT band area will hurt and is kind of numb. History of Present Injury/Illness (Reason for Referral):  As above. Past Medical History/Comorbidities:   Ms. Vito Kellogg  has a past medical history of Abnormal cardiovascular function study; Acute cholecystitis (12/17/2013); Allergic rhinitis (9/27/2012); Allergic rhinitis due to allergen (7/1/2015); Anemia (9/27/2012); Atrial fibrillation ([address] blindness to left eye; Chronic anemia (7/1/2015); Chronic kidney disease, stage III (moderate) (9/27/2012); Crohn's disease ([address] Diabetes ([address] DM2 (diabetes mellitus, type 2) ([address] (9/27/2012); Essential hypertension (7/1/2015); Gastroesophageal reflux disease without esophagitis (7/1/2015); GERD (gastroesophageal reflux disease) (9/27/2012);  Heart murmur; HLD (hyperlipidemia) (9/27/2012); HTN (hypertension); Hypokalemia (9/27/2012); Hypothyroidism; Hypothyroidism (9/27/2012); Hypothyroidism due to medication (7/1/2015); Inflammatory bowel disease (7/1/2015); Palpitations; Personal history of asthma-as a child. (8/24/2016); Type 2 diabetes mellitus without complication ([facility] (0/1/8368); and Ulcerative colitis ([facility] Ms. Negrito Wynn  has a past surgical history that includes partial thyroidectomy; orthopaedic; carpal tunnel release; cataract removal; heent; and lap cholecystectomy. Past Surgical History:   Procedure Laterality Date    HX CARPAL TUNNEL RELEASE      HX CATARACT REMOVAL      to L    HX HEENT      shunt to L eye r/t glaucoma    HX LAP CHOLECYSTECTOMY      HX ORTHOPAEDIC      to back    HX PARTIAL THYROIDECTOMY       Social History/Living Environment:     lives with spouse. Walking in the neighborhood in a large [employer] parking lot for about 20 minutes about 2 times per week. Prior Level of Function/Work/Activity:    Current Medications:    Current Outpatient Prescriptions:     clonazePAM (KLONOPIN) 0.5 mg tablet, 0.25mg tab every morning and take 0.25 mg daily as needed, Disp: 30 Tab, Rfl: 2    INFLIXIMAB (REMICADE IV), by IntraVENous route., Disp: , Rfl:     NOVOLOG 100 unit/mL injection, INJECT 6 UNITS UNDER THE SKIN THREE TIMES DAILY WITH BREAKFAST, LUNCH, AND DINNER, Disp: 10 mL, Rfl: 0    magnesium oxide (MAG-OX) 400 mg tablet, Take 1 Tab by mouth daily. , Disp: 90 Tab, Rfl: 0    BD SINGLE USE SWABS REGULAR padm, USE AS DIRECTED, Disp: , Rfl: 1    levothyroxine (SYNTHROID) 50 mcg tablet, Take 1 Tab by mouth Daily (before breakfast). , Disp: 90 Tab, Rfl: 1    dicyclomine (BENTYL) 20 mg tablet, Take 1 Tab by mouth two (2) times a day., Disp: 60 Tab, Rfl: 2    budesonide-formoterol (SYMBICORT) 160-4.5 mcg/actuation HFA inhaler, Take 2 Puffs by inhalation two (2) times a day., Disp: 1 Inhaler, Rfl: 11    glucose blood VI test strips (FREESTYLE TEST) strip, Test blood sugar QID Dx E11.69, Disp: 120 Strip, Rfl: 5    amLODIPine (NORVASC) 10 mg tablet, Take 1 Tab by mouth daily. , Disp: 90 Tab, Rfl: 3    FREESTYLE LITE METER monitoring kit, TEST BLOOD SUGAR QID BEFORE MEALS AND NIGHTLY, Disp: , Rfl: 2    ONE TOUCH DELICA 33 gauge misc, TEST BLOOD SUGAR BEFORE EACH MEAL AND HS, Disp: , Rfl: 0    FREESTYLE LANCETS 28 gauge misc, TEST BLOOD SUGAR QID BEFORE MEALS AND NIGHTLY, Disp: , Rfl: 2    Insulin Syringe-Needle U-100 0.3 mL 31 gauge x 5/16 syrg, U UTD WITH INSULIN, Disp: , Rfl: 5    glucose blood VI test strips (BLOOD GLUCOSE TEST) strip, by Does Not Apply route., Disp: , Rfl:     BD INSULIN PEN NEEDLE UF MINI 31 gauge x 3/16\" ndle, USE ONE DAILY, Disp: , Rfl: 1    valsartan (DIOVAN) 320 mg tablet, TAKE 1 TABLET BY MOUTH EVERY DAY, Disp: 90 Tab, Rfl: 1    atorvastatin (LIPITOR) 80 mg tablet, TAKE 1 TABLET BY MOUTH EVERY DAY, Disp: 90 Tab, Rfl: 1    potassium chloride (KLOR-CON M20) 20 mEq tablet, Take 1 Tab by mouth daily. , Disp: 90 Tab, Rfl: 3    insulin glargine (LANTUS SOLOSTAR) 100 unit/mL (3 mL) pen, 10 Units by SubCUTAneous route nightly., Disp: 1 Each, Rfl: 11    KETOTIFEN FUMARATE (EYE ITCH RELIEF OP), Apply  to eye., Disp: , Rfl:     albuterol (PROVENTIL HFA, VENTOLIN HFA, PROAIR HFA) 90 mcg/actuation inhaler, Take 2 Puffs by inhalation every four (4) hours as needed for Wheezing., Disp: 1 Inhaler, Rfl: 0    fluticasone (FLONASE) 50 mcg/actuation nasal spray, 2 Sprays by Both Nostrils route daily. , Disp: 1 Bottle, Rfl: 11   Date Last Reviewed:  7/287/17   Number of Personal Factors/Comorbidities that affect the Plan of Care: 0: LOW COMPLEXITY   EXAMINATION:   ROM:          WFL - slight tightness in full range of shoulder flexion. Sometimes stiff in hands. Strength:          Shoulders 3+/5. Rest ok.   Functional Mobility:         Gait/Ambulation:  Pt ambulates with a straight cane with a slight narrow JAVID with out going toes and feet.  Pt looks down. Pt walks with less confidence without the cane. Pt wears small wedge heel that are more narrow than her heel but she has been doing this for many years and managed decently during testing. Transfers:  independent        Bed Mobility:  NT  Mental Status:          Alert and oriented x 4;  Pleasant and appropriate. Vision:          Wears bifocal.     Body Structures Involved:  1. Eyes and Ears  2. Bones  3. Joints  4. Muscles  5. Ligaments Body Functions Affected:  1. Neuromusculoskeletal  2. Movement Related Activities and Participation Affected:  1. Learning and Applying Knowledge  2. General Tasks and Demands  3. Mobility  4. Domestic Life  5. Interpersonal Interactions and Relationships  6. Community, Social and [place]   Number of elements that affect the Plan of Care: 4+: HIGH COMPLEXITY   CLINICAL PRESENTATION:   Presentation: Stable and uncomplicated: LOW COMPLEXITY   CLINICAL DECISION MAKING:   Outcome Measure: Tool Used: 6-Minute Walk Test   Score:  Initial: 600 Feet = TBD Meters  Most Recent: TBD Feet = TBD Meters    Interpretation of Score: AGE  GENDER  MEAN  SD  NORMAL RANGE (2SD)    61-76  Male (15)   Female (22)  572   538  80   80  1-0   [id number]    66-77  Male (14)   Female (22)  527   471  80   69  46-65   [phone]    80-80  Male (8)   Female (66)  337   871  00   48  222-356   [id number]        Tool Used: Giselle Folk Balance Scale  Score:  Initial: 51/56 Most Recent: X/56 (Date: -- )   Interpretation of Score: Each section is scored on a 0-4 scale, 0 representing the patients inability to perform the task and 4 representing independence. The scores of each section are added together for a total score of 56. The higher the patients score, the more independent the patient is. Any score below 45 indicates increased risk for falls.     Score 56 55-45 44-34 33-23 22-12 11-1 0   Modifier CH CI CJ CK CL CM CN     Tool Used: Timed Up and Go (TUG)  Score:  Initial: 15.31 seconds Most Recent: X seconds (Date: -- )   Interpretation of Score: The test measures, in seconds, the time taken by an individual to stand up from a standard arm chair (seat height 46 cm [18 in], arm height 65 cm [25.6 in]), walk a distance of 3 meters (118 in, approx 10 ft), turn, walk back to the chair and sit down. If the individual takes longer than 14 seconds to complete TUG, this indicates risk for falls. Score 7 7.5-10.5 11-14 14.5-17.5 18-21 21.5-24.5 25+   Modifier CH CI CJ CK CL CM CN     ? Mobility - Walking and Moving Around:     - CURRENT STATUS: CK - 40%-59% impaired, limited or restricted    - GOAL STATUS: CJ - 20%-39% impaired, limited or restricted    - D/C STATUS:  ---------------To be determined---------------      Medical Necessity:   · Skilled intervention continues to be required due to DEBILITY. Reason for Services/Other Comments:  · Patient continues to require skilled intervention due to DEBILITY. Use of outcome tool(s) and clinical judgement create a POC that gives a: Clear prediction of patient's progress: LOW COMPLEXITY   TREATMENT:   (In addition to Assessment/Re-Assessment sessions the following treatments were rendered    Pre Treatment Subjective: Patient reports no pain. Patient states she has weakness in arms and legs and has had balance problems. Therapeutic Exercise: ( 30 minutes):  Exercises per grid below to improve mobility, strength, balance and coordination. Required minimal verbal cues to promote proper body alignment, promote proper body posture, promote proper body mechanics and promote proper body breathing techniques. Progressed resistance, range, repetitions and complexity of movement as indicated.    Date:  8/3/17 Date:   Date:     Activity/Exercise Parameters Parameters    NuStep Level 1  11 minutes     LAQ   2 x 10 reps with cues for good control and perform slowly     Supine bridging   2 x 10 reps   With cues to perform slowly Single knee to chest   3 x 20 second hold B     Piriformis stretch/hip capsule stretch   3 x 20 second hold B each     Side lying clams 2 x 10 reps B                   Blood pressure: 144/78 (afer nustep)    HEP: patient issued written handouts of all exercises as noted in above flow sheet. Patient with good understanding of exercises. Manual Therapy: (15 minutes) patient left side lying for STM to right low back, hip, quadratus and upper glut. Palpable tightness in all areas. Modalities: (8 minutes) patient left side lying with pillow between knees for moist heat to right hip and low back area to help increase blood flow and decrease tightness. Skin intact. Treatment/Session Assessment:  Patient tolerated treatment well with good performance of exercises without increased pain. Patient reported good relief with manual treatment and moist heat to right low back and hip area with palpable tightness decreased following treatment. Begin standing exercises next visit for strength and balance. Patients response to todays treatment session was tolerated well with no medical complications. .  · Post session pain:  NONE  · Compliance with Program/Exercises: Will assess as treatment progresses. · Recommendations/Intent for next treatment session: \"Next visit will focus on advancements to more challenging activities\".   Total Treatment Duration:  PT Patient Time In/Time Out  Time In: 1000  Time Out: [address], PTA

## 2017-08-08 ENCOUNTER — HOSPITAL ENCOUNTER (OUTPATIENT)
Dept: PHYSICAL THERAPY | Age: 72
Discharge: HOME | End: 2017-08-08
Attending: NURSE PRACTITIONER
Payer: MEDICARE

## 2017-08-08 PROCEDURE — 97110 THERAPEUTIC EXERCISES: CPT

## 2017-08-08 NOTE — PROGRESS NOTES
Har[facility] Carter  : [facility] [address]  [phone]   [phone]          OUTPATIENT PHYSICAL THERAPY:Daily Note 8/10/2017    ICD-10: Treatment Diagnosis: Other abnormalities of gait and mobility (R26.89); Precautions/Allergies:   Heparin (porcine); Lovenox [enoxaparin]; and Sulfa (sulfonamide antibiotics)   Fall Risk Score: 1 (? 5 = High Risk)  MD Orders: PT eval/falls risk MEDICAL/REFERRING DIAGNOSIS:  At risk for falls [Z91.81]   DATE OF ONSET: 10/2016  REFERRING PHYSICIAN: Edwige Devi NP  RETURN PHYSICIAN APPOINTMENT: unknown  DATE OF PROGRESS NOTE:    RECERTIFICATION DATE:      INITIAL ASSESSMENT:  Ms. Kirstie Julian presents with some functional weakness and decreased confidence in her balance. Pt will benefit from strengthening and high level balance and gait routine to maximize independence and safety with walking and mobility    PROBLEM LIST (Impacting functional limitations):  1. Decreased Strength  2. Decreased Ambulation Ability/Technique  3. Decreased Balance  4. Decreased Activity Tolerance  5. Increased Fatigue  6. Decreased Flexibility/Joint Mobility  7. Decreased [place] with Home Exercise Program INTERVENTIONS PLANNED:  1. Balance Exercise  2. Gait Training  3. Home Exercise Program (HEP)  4. Range of Motion (ROM)  5. Therapeutic Activites  6. Therapeutic Exercise/Strengthening   TREATMENT PLAN:  Effective Dates: 17 TO 10/22/17. Frequency/Duration: 2 times a week for 3 weeks  GOALS: (Goals have been discussed and agreed upon with patient.)  Short-Term Functional Goals: Time Frame: 6 visits  1. Pt will increase 6 minute walk time by 200 feet indicating increased functional strength and gait ability and decrease risk for fall. 2. Pt will increase Wei Balance Scale by 2 points indicating increased static standing balance and decreased risk for fall  3.  Pt will decrease her TUG time by 2 seconds indicating increase gait ability and decreased risk for fall. Rehabilitation Potential For Stated Goals: Good  Regarding Alice Byrd's therapy, I certify that the treatment plan above will be carried out by a therapist or under their direction. Thank you for this referral,  Fariha Martin, PT     Referring Physician Signature: Miguel Barrett NP              Date                    HISTORY:   GOAL:  Walk with better balance and get rid of the cane. Present Symptoms:    66YEAR OLD [place] FEMALE. fELL ABOUT 6 TIMES WHEN FIRST CAME HERE IN 2011. Last fall was beginning of last year I have ulcerative collitus and my BP dropped. Using cane went into the hospital for ulcerative collitis in Fall 2016 and in and out through the New Year. February to [facility] for rehab and then went. Then some HH therapy through ~April. Uses cane in house at times. When my stomach is acting up I feel the weakest.  Lives with  - good health. Blind in left eye since 4years old  PAIN:  Some has to do with the way I sleep every now and then. A pian in my right hip and down lateral leg. I f I sit or stand or walk too much but the IT band area will hurt and is kind of numb. History of Present Injury/Illness (Reason for Referral):  As above. Past Medical History/Comorbidities:   Ms. Luís Cancino  has a past medical history of Abnormal cardiovascular function study; Acute cholecystitis (12/17/2013); Allergic rhinitis (9/27/2012); Allergic rhinitis due to allergen (7/1/2015); Anemia (9/27/2012); Atrial fibrillation ([address] blindness to left eye; Chronic anemia (7/1/2015); Chronic kidney disease, stage III (moderate) (9/27/2012); Crohn's disease ([address] Diabetes ([address] DM2 (diabetes mellitus, type 2) ([address] (9/27/2012); Essential hypertension (7/1/2015); Gastroesophageal reflux disease without esophagitis (7/1/2015); GERD (gastroesophageal reflux disease) (9/27/2012);  Heart murmur; HLD (hyperlipidemia) (9/27/2012); HTN (hypertension); Hypokalemia (9/27/2012); Hypothyroidism; Hypothyroidism (9/27/2012); Hypothyroidism due to medication (7/1/2015); Inflammatory bowel disease (7/1/2015); Palpitations; Personal history of asthma-as a child. (8/24/2016); Type 2 diabetes mellitus without complication ([facility] (0/5/4217); and Ulcerative colitis ([facility] Ms. Fernanda Florentino  has a past surgical history that includes partial thyroidectomy; orthopaedic; carpal tunnel release; cataract removal; heent; and lap cholecystectomy. Past Surgical History:   Procedure Laterality Date    HX CARPAL TUNNEL RELEASE      HX CATARACT REMOVAL      to L    HX HEENT      shunt to L eye r/t glaucoma    HX LAP CHOLECYSTECTOMY      HX ORTHOPAEDIC      to back    HX PARTIAL THYROIDECTOMY       Social History/Living Environment:     lives with spouse. Walking in the neighborhood in a large [employer] parking lot for about 20 minutes about 2 times per week. Prior Level of Function/Work/Activity:    Current Medications:    Current Outpatient Prescriptions:     levothyroxine (SYNTHROID) 50 mcg tablet, TAKE 1 TABLET BY MOUTH DAILY BEFORE BREAKFAST, Disp: 90 Tab, Rfl: 0    valsartan (DIOVAN) 320 mg tablet, TAKE 1 TABLET BY MOUTH EVERY DAY, Disp: 90 Tab, Rfl: 0    atorvastatin (LIPITOR) 80 mg tablet, TAKE 1 TABLET BY MOUTH EVERY DAY, Disp: 90 Tab, Rfl: 0    clonazePAM (KLONOPIN) 0.5 mg tablet, 0.25mg tab every morning and take 0.25 mg daily as needed, Disp: 30 Tab, Rfl: 2    INFLIXIMAB (REMICADE IV), by IntraVENous route., Disp: , Rfl:     NOVOLOG 100 unit/mL injection, INJECT 6 UNITS UNDER THE SKIN THREE TIMES DAILY WITH BREAKFAST, LUNCH, AND DINNER, Disp: 10 mL, Rfl: 0    magnesium oxide (MAG-OX) 400 mg tablet, Take 1 Tab by mouth daily. , Disp: 90 Tab, Rfl: 0    BD SINGLE USE SWABS REGULAR padm, USE AS DIRECTED, Disp: , Rfl: 1    dicyclomine (BENTYL) 20 mg tablet, Take 1 Tab by mouth two (2) times a day., Disp: 60 Tab, Rfl: 2   budesonide-formoterol (SYMBICORT) 160-4.5 mcg/actuation HFA inhaler, Take 2 Puffs by inhalation two (2) times a day., Disp: 1 Inhaler, Rfl: 11    glucose blood VI test strips (FREESTYLE TEST) strip, Test blood sugar QID Dx E11.69, Disp: 120 Strip, Rfl: 5    amLODIPine (NORVASC) 10 mg tablet, Take 1 Tab by mouth daily. , Disp: 90 Tab, Rfl: 3    FREESTYLE LITE METER monitoring kit, TEST BLOOD SUGAR QID BEFORE MEALS AND NIGHTLY, Disp: , Rfl: 2    ONE TOUCH DELICA 33 gauge misc, TEST BLOOD SUGAR BEFORE EACH MEAL AND HS, Disp: , Rfl: 0    FREESTYLE LANCETS 28 gauge misc, TEST BLOOD SUGAR QID BEFORE MEALS AND NIGHTLY, Disp: , Rfl: 2    Insulin Syringe-Needle U-100 0.3 mL 31 gauge x 5/16 syrg, U UTD WITH INSULIN, Disp: , Rfl: 5    glucose blood VI test strips (BLOOD GLUCOSE TEST) strip, by Does Not Apply route., Disp: , Rfl:     BD INSULIN PEN NEEDLE UF MINI 31 gauge x 3/16\" ndle, USE ONE DAILY, Disp: , Rfl: 1    potassium chloride (KLOR-CON M20) 20 mEq tablet, Take 1 Tab by mouth daily. , Disp: 90 Tab, Rfl: 3    insulin glargine (LANTUS SOLOSTAR) 100 unit/mL (3 mL) pen, 10 Units by SubCUTAneous route nightly., Disp: 1 Each, Rfl: 11    KETOTIFEN FUMARATE (EYE ITCH RELIEF OP), Apply  to eye., Disp: , Rfl:     albuterol (PROVENTIL HFA, VENTOLIN HFA, PROAIR HFA) 90 mcg/actuation inhaler, Take 2 Puffs by inhalation every four (4) hours as needed for Wheezing., Disp: 1 Inhaler, Rfl: 0    fluticasone (FLONASE) 50 mcg/actuation nasal spray, 2 Sprays by Both Nostrils route daily. , Disp: 1 Bottle, Rfl: 11   Date Last Reviewed:  8/10/2017   Number of Personal Factors/Comorbidities that affect the Plan of Care: 0: LOW COMPLEXITY   EXAMINATION:   ROM:          WFL - slight tightness in full range of shoulder flexion. Sometimes stiff in hands. Strength:          Shoulders 3+/5. Rest ok.   Functional Mobility:         Gait/Ambulation:  Pt ambulates with a straight cane with a slight narrow JAVID with out going toes and feet.  Pt looks down. Pt walks with less confidence without the cane. Pt wears small wedge heel that are more narrow than her heel but she has been doing this for many years and managed decently during testing. Transfers:  independent        Bed Mobility:  NT  Mental Status:          Alert and oriented x 4;  Pleasant and appropriate. Vision:          Wears bifocal.     Body Structures Involved:  1. Eyes and Ears  2. Bones  3. Joints  4. Muscles  5. Ligaments Body Functions Affected:  1. Neuromusculoskeletal  2. Movement Related Activities and Participation Affected:  1. Learning and Applying Knowledge  2. General Tasks and Demands  3. Mobility  4. Domestic Life  5. Interpersonal Interactions and Relationships  6. Community, Social and [place]   Number of elements that affect the Plan of Care: 4+: HIGH COMPLEXITY   CLINICAL PRESENTATION:   Presentation: Stable and uncomplicated: LOW COMPLEXITY   CLINICAL DECISION MAKING:   Outcome Measure: Tool Used: 6-Minute Walk Test   Score:  Initial: 600 Feet = TBD Meters  Most Recent: TBD Feet = TBD Meters    Interpretation of Score: AGE  GENDER  MEAN  SD  NORMAL RANGE (2SD)    61-76  Male (15)   Female (22)  572   538  80   80  1-0   [phone]    66-77  Male (14)   Female (22)  527   471  80   69  46-65   [phone]    80-80  Male (8)   Female (57)  920   869  92   43  [phone]   [phone]        Tool Used: Harris Balance Scale  Score:  Initial: 51/56 Most Recent: X/56 (Date: -- )   Interpretation of Score: Each section is scored on a 0-4 scale, 0 representing the patients inability to perform the task and 4 representing independence. The scores of each section are added together for a total score of 56. The higher the patients score, the more independent the patient is. Any score below 45 indicates increased risk for falls.     Score 56 55-45 44-34 33-23 22-12 11-1 0   Modifier CH CI CJ CK CL CM CN     Tool Used: Timed Up and Go (TUG)  Score:  Initial: 15.31 seconds Most Recent: X seconds (Date: -- )   Interpretation of Score: The test measures, in seconds, the time taken by an individual to stand up from a standard arm chair (seat height 46 cm [18 in], arm height 65 cm [25.6 in]), walk a distance of 3 meters (118 in, approx 10 ft), turn, walk back to the chair and sit down. If the individual takes longer than 14 seconds to complete TUG, this indicates risk for falls. Score 7 7.5-10.5 11-14 14.5-17.5 18-21 21.5-24.5 25+   Modifier CH CI CJ CK CL CM CN     ? Mobility - Walking and Moving Around:     - CURRENT STATUS: CK - 40%-59% impaired, limited or restricted    - GOAL STATUS: CJ - 20%-39% impaired, limited or restricted    - D/C STATUS:  ---------------To be determined---------------      Medical Necessity:   · Skilled intervention continues to be required due to DEBILITY. Reason for Services/Other Comments:  · Patient continues to require skilled intervention due to DEBILITY. Use of outcome tool(s) and clinical judgement create a POC that gives a: Clear prediction of patient's progress: LOW COMPLEXITY   S:  I left my stick at home today. No pain today. TREATMENT:   (In addition to Assessment/Re-Assessment sessions the following treatments were rendered    Pre Treatment Subjective: Patient reports no pain. Therapeutic Exercise: (  50 minutes):  Exercises per grid below to improve mobility, strength, balance and coordination. Required minimal verbal cues to promote proper body alignment, promote proper body posture, promote proper body mechanics and promote proper body breathing techniques. Progressed resistance, range, repetitions and complexity of movement as indicated.    Date:  8/3/17 Date:  8/8/17   Date:     Activity/Exercise Parameters Parameters    NuStep Level 1  11 minutes Level 2  12 minutes      LAQ   2 x 10 reps with cues for good control and perform slowly -    Pelvic clocks to loosen pelvis and relieve tightness 12:00 to 6:00 2 x 10 with guiding and cues  3:00 to 9:00 x 10    Supine bridging   2 x 10 reps   With cues to perform slowly 2 x 10 cues to increase effort and perform slowly. Single knee to chest   3 x 20 second hold B 3 x 20 seconds each  Cues to keep leg more aligned vs in ER. Pt feels the stretch - but no sharp pain. Piriformis stretch/hip capsule stretch   3 x 20 second hold B each 3 x 20 seconds B. Pain in right knee with overpressure. Cues to grasp under thigh. Side lying clams 2 x 10 reps B     Supine lower trunk rotation for increase range of motion  5 x 10 seconds to each side with discomfort    Heel/toe raises  x20    Side step  x20 to each side. Cues to full WS to the side and keeping standing leg straight    Single leg standing  3 x 10 seconds on and off hold                  HEP: patient issued written handouts of all exercises as noted in above flow sheet. Patient with good understanding of exercises. Manual Therapy: (0 minutes) patient left side lying for STM to right low back, hip, quadratus and upper glut. Palpable tightness in all areas. Modalities: (0 minutes) patient left side lying with pillow between knees for moist heat to right hip and low back area to help increase blood flow and decrease tightness. Skin intact. Treatment/Session Assessment:  Fatigued after exercises. Getting stronger. No increase in pain. Patients response to todays treatment session was tolerated well with no medical complications. .  · Post session pain:  NONE  · Compliance with Program/Exercises: Will assess as treatment progresses. · Recommendations/Intent for next treatment session: \"Next visit will focus on advancements to more challenging activities\".   Total Treatment Duration:  PT Patient Time In/Time Out  Time In: 0830  Time Out: PAPA [place]

## 2017-08-16 ENCOUNTER — HOSPITAL ENCOUNTER (OUTPATIENT)
Dept: PHYSICAL THERAPY | Age: 72
Discharge: HOME | End: 2017-08-16
Attending: NURSE PRACTITIONER
Payer: MEDICARE

## 2017-08-16 PROCEDURE — 97140 MANUAL THERAPY 1/> REGIONS: CPT

## 2017-08-16 PROCEDURE — 97110 THERAPEUTIC EXERCISES: CPT

## 2017-08-16 NOTE — PROGRESS NOTES
Tracy Zamorano  : [facility] [address]  [phone]   [phone]          OUTPATIENT PHYSICAL THERAPY:Daily Note 2017    ICD-10: Treatment Diagnosis: Other abnormalities of gait and mobility (R26.89); Precautions/Allergies:   Heparin (porcine); Lovenox [enoxaparin]; and Sulfa (sulfonamide antibiotics)   Fall Risk Score: 1 (? 5 = High Risk)  MD Orders: PT eval/falls risk MEDICAL/REFERRING DIAGNOSIS:  At risk for falls [Z91.81]   DATE OF ONSET: 10/2016  REFERRING PHYSICIAN: Ariel Devi NP  RETURN PHYSICIAN APPOINTMENT: unknown  DATE OF PROGRESS NOTE:    RECERTIFICATION DATE:      INITIAL ASSESSMENT:  Ms. Geneva Wick presents with some functional weakness and decreased confidence in her balance. Pt will benefit from strengthening and high level balance and gait routine to maximize independence and safety with walking and mobility    PROBLEM LIST (Impacting functional limitations):  1. Decreased Strength  2. Decreased Ambulation Ability/Technique  3. Decreased Balance  4. Decreased Activity Tolerance  5. Increased Fatigue  6. Decreased Flexibility/Joint Mobility  7. Decreased [place] with Home Exercise Program INTERVENTIONS PLANNED:  1. Balance Exercise  2. Gait Training  3. Home Exercise Program (HEP)  4. Range of Motion (ROM)  5. Therapeutic Activites  6. Therapeutic Exercise/Strengthening   TREATMENT PLAN:  Effective Dates: 17 TO 10/22/17. Frequency/Duration: 2 times a week for 3 weeks  GOALS: (Goals have been discussed and agreed upon with patient.)  Short-Term Functional Goals: Time Frame: 6 visits  1. Pt will increase 6 minute walk time by 200 feet indicating increased functional strength and gait ability and decrease risk for fall. 2. Pt will increase Wei Balance Scale by 2 points indicating increased static standing balance and decreased risk for fall  3.  Pt will decrease her TUG time by 2 seconds indicating increase gait ability and decreased risk for fall. Rehabilitation Potential For Stated Goals: Good  Regarding Alice Byrd's therapy, I certify that the treatment plan above will be carried out by a therapist or under their direction. Thank you for this referral,  Nishi Vaughan PTA     Referring Physician Signature: Felipe Valenzuela NP              Date                    HISTORY:   GOAL:  Walk with better balance and get rid of the cane. Present Symptoms:    66YEAR OLD [place] FEMALE. fELL ABOUT 6 TIMES WHEN FIRST CAME HERE IN 2011. Last fall was beginning of last year I have ulcerative collitus and my BP dropped. Using cane went into the hospital for ulcerative collitis in Fall 2016 and in and out through the New Year. February to [facility] for rehab and then went. Then some HH therapy through ~April. Uses cane in house at times. When my stomach is acting up I feel the weakest.  Lives with  - good health. Blind in left eye since 4years old  PAIN:  Some has to do with the way I sleep every now and then. A pian in my right hip and down lateral leg. I f I sit or stand or walk too much but the IT band area will hurt and is kind of numb. History of Present Injury/Illness (Reason for Referral):  As above. Past Medical History/Comorbidities:   Ms. Chan Brooks  has a past medical history of Abnormal cardiovascular function study; Acute cholecystitis (12/17/2013); Allergic rhinitis (9/27/2012); Allergic rhinitis due to allergen (7/1/2015); Anemia (9/27/2012); Atrial fibrillation ([address] blindness to left eye; Chronic anemia (7/1/2015); Chronic kidney disease, stage III (moderate) (9/27/2012); Crohn's disease ([address] Diabetes ([address] DM2 (diabetes mellitus, type 2) ([address] (9/27/2012); Essential hypertension (7/1/2015); Gastroesophageal reflux disease without esophagitis (7/1/2015); GERD (gastroesophageal reflux disease) (9/27/2012);  Heart murmur; HLD (hyperlipidemia) (9/27/2012); HTN (hypertension); Hypokalemia (9/27/2012); Hypothyroidism; Hypothyroidism (9/27/2012); Hypothyroidism due to medication (7/1/2015); Inflammatory bowel disease (7/1/2015); Palpitations; Personal history of asthma-as a child. (8/24/2016); Type 2 diabetes mellitus without complication ([facility] (1/1/4676); and Ulcerative colitis ([facility] Ms. Thalia Yanez  has a past surgical history that includes partial thyroidectomy; orthopaedic; carpal tunnel release; cataract removal; heent; and lap cholecystectomy. Past Surgical History:   Procedure Laterality Date    HX CARPAL TUNNEL RELEASE      HX CATARACT REMOVAL      to L    HX HEENT      shunt to L eye r/t glaucoma    HX LAP CHOLECYSTECTOMY      HX ORTHOPAEDIC      to back    HX PARTIAL THYROIDECTOMY       Social History/Living Environment:     lives with spouse. Walking in the neighborhood in a large [employer] parking lot for about 20 minutes about 2 times per week. Prior Level of Function/Work/Activity:    Current Medications:    Current Outpatient Prescriptions:     levothyroxine (SYNTHROID) 50 mcg tablet, TAKE 1 TABLET BY MOUTH DAILY BEFORE BREAKFAST, Disp: 90 Tab, Rfl: 0    valsartan (DIOVAN) 320 mg tablet, TAKE 1 TABLET BY MOUTH EVERY DAY, Disp: 90 Tab, Rfl: 0    atorvastatin (LIPITOR) 80 mg tablet, TAKE 1 TABLET BY MOUTH EVERY DAY, Disp: 90 Tab, Rfl: 0    clonazePAM (KLONOPIN) 0.5 mg tablet, 0.25mg tab every morning and take 0.25 mg daily as needed, Disp: 30 Tab, Rfl: 2    INFLIXIMAB (REMICADE IV), by IntraVENous route., Disp: , Rfl:     NOVOLOG 100 unit/mL injection, INJECT 6 UNITS UNDER THE SKIN THREE TIMES DAILY WITH BREAKFAST, LUNCH, AND DINNER, Disp: 10 mL, Rfl: 0    magnesium oxide (MAG-OX) 400 mg tablet, Take 1 Tab by mouth daily. , Disp: 90 Tab, Rfl: 0    BD SINGLE USE SWABS REGULAR padm, USE AS DIRECTED, Disp: , Rfl: 1    dicyclomine (BENTYL) 20 mg tablet, Take 1 Tab by mouth two (2) times a day., Disp: 60 Tab, Rfl: 2   budesonide-formoterol (SYMBICORT) 160-4.5 mcg/actuation HFA inhaler, Take 2 Puffs by inhalation two (2) times a day., Disp: 1 Inhaler, Rfl: 11    glucose blood VI test strips (FREESTYLE TEST) strip, Test blood sugar QID Dx E11.69, Disp: 120 Strip, Rfl: 5    amLODIPine (NORVASC) 10 mg tablet, Take 1 Tab by mouth daily. , Disp: 90 Tab, Rfl: 3    FREESTYLE LITE METER monitoring kit, TEST BLOOD SUGAR QID BEFORE MEALS AND NIGHTLY, Disp: , Rfl: 2    ONE TOUCH DELICA 33 gauge misc, TEST BLOOD SUGAR BEFORE EACH MEAL AND HS, Disp: , Rfl: 0    FREESTYLE LANCETS 28 gauge misc, TEST BLOOD SUGAR QID BEFORE MEALS AND NIGHTLY, Disp: , Rfl: 2    Insulin Syringe-Needle U-100 0.3 mL 31 gauge x 5/16 syrg, U UTD WITH INSULIN, Disp: , Rfl: 5    glucose blood VI test strips (BLOOD GLUCOSE TEST) strip, by Does Not Apply route., Disp: , Rfl:     BD INSULIN PEN NEEDLE UF MINI 31 gauge x 3/16\" ndle, USE ONE DAILY, Disp: , Rfl: 1    potassium chloride (KLOR-CON M20) 20 mEq tablet, Take 1 Tab by mouth daily. , Disp: 90 Tab, Rfl: 3    insulin glargine (LANTUS SOLOSTAR) 100 unit/mL (3 mL) pen, 10 Units by SubCUTAneous route nightly., Disp: 1 Each, Rfl: 11    KETOTIFEN FUMARATE (EYE ITCH RELIEF OP), Apply  to eye., Disp: , Rfl:     albuterol (PROVENTIL HFA, VENTOLIN HFA, PROAIR HFA) 90 mcg/actuation inhaler, Take 2 Puffs by inhalation every four (4) hours as needed for Wheezing., Disp: 1 Inhaler, Rfl: 0    fluticasone (FLONASE) 50 mcg/actuation nasal spray, 2 Sprays by Both Nostrils route daily. , Disp: 1 Bottle, Rfl: 11   Date Last Reviewed:  8/16/2017   Number of Personal Factors/Comorbidities that affect the Plan of Care: 0: LOW COMPLEXITY   EXAMINATION:   ROM:          WFL - slight tightness in full range of shoulder flexion. Sometimes stiff in hands. Strength:          Shoulders 3+/5. Rest ok.   Functional Mobility:         Gait/Ambulation:  Pt ambulates with a straight cane with a slight narrow JAVID with out going toes and feet.  Pt looks down. Pt walks with less confidence without the cane. Pt wears small wedge heel that are more narrow than her heel but she has been doing this for many years and managed decently during testing. Transfers:  independent        Bed Mobility:  NT  Mental Status:          Alert and oriented x 4;  Pleasant and appropriate. Vision:          Wears bifocal.     Body Structures Involved:  1. Eyes and Ears  2. Bones  3. Joints  4. Muscles  5. Ligaments Body Functions Affected:  1. Neuromusculoskeletal  2. Movement Related Activities and Participation Affected:  1. Learning and Applying Knowledge  2. General Tasks and Demands  3. Mobility  4. Domestic Life  5. Interpersonal Interactions and Relationships  6. Community, Social and [place]   Number of elements that affect the Plan of Care: 4+: HIGH COMPLEXITY   CLINICAL PRESENTATION:   Presentation: Stable and uncomplicated: LOW COMPLEXITY   CLINICAL DECISION MAKING:   Outcome Measure: Tool Used: 6-Minute Walk Test   Score:  Initial: 600 Feet = TBD Meters  Most Recent: TBD Feet = TBD Meters    Interpretation of Score: AGE  GENDER  MEAN  SD  NORMAL RANGE (2SD)    61-76  Male (15)   Female (22)  572   538  80   80  1-0   [phone]    66-77  Male (14)   Female (22)  527   471  80   69  46-65   [phone]    80-80  Male (8)   Female (96)  995   390  68   87  [phone]   [id number]        Tool Used: Harris Balance Scale  Score:  Initial: 51/56 Most Recent: X/56 (Date: -- )   Interpretation of Score: Each section is scored on a 0-4 scale, 0 representing the patients inability to perform the task and 4 representing independence. The scores of each section are added together for a total score of 56. The higher the patients score, the more independent the patient is. Any score below 45 indicates increased risk for falls.     Score 56 55-45 44-34 33-23 22-12 11-1 0   Modifier CH CI CJ CK CL CM CN     Tool Used: Timed Up and Go (TUG)  Score:  Initial: 15.31 seconds Most Recent: X seconds (Date: -- )   Interpretation of Score: The test measures, in seconds, the time taken by an individual to stand up from a standard arm chair (seat height 46 cm [18 in], arm height 65 cm [25.6 in]), walk a distance of 3 meters (118 in, approx 10 ft), turn, walk back to the chair and sit down. If the individual takes longer than 14 seconds to complete TUG, this indicates risk for falls. Score 7 7.5-10.5 11-14 14.5-17.5 18-21 21.5-24.5 25+   Modifier CH CI CJ CK CL CM CN     ? Mobility - Walking and Moving Around:     - CURRENT STATUS: CK - 40%-59% impaired, limited or restricted    - GOAL STATUS: CJ - 20%-39% impaired, limited or restricted    - D/C STATUS:  ---------------To be determined---------------      Medical Necessity:   · Skilled intervention continues to be required due to DEBILITY. Reason for Services/Other Comments:  · Patient continues to require skilled intervention due to DEBILITY. Use of outcome tool(s) and clinical judgement create a POC that gives a: Clear prediction of patient's progress: LOW COMPLEXITY   S:  I left my stick at home today. No pain today. TREATMENT:   (In addition to Assessment/Re-Assessment sessions the following treatments were rendered    Pre Treatment Subjective: Patient reports no pain. Patient stated that she was 15 minutes late due to her transportation van. Patient states she also has an MD appointment after PT today and will need to leave a little early    Therapeutic Exercise: (  25 minutes):  Exercises per grid below to improve mobility, strength, balance and coordination. Required minimal verbal cues to promote proper body alignment, promote proper body posture, promote proper body mechanics and promote proper body breathing techniques. Progressed resistance, range, repetitions and complexity of movement as indicated.    Date:  8/3/17 Date:  8/8/17   Date:  8/16/17   Activity/Exercise Parameters Parameters NuStep Level 1  11 minutes Level 2  12 minutes   Level 3  10 minutes   LAQ   2 x 10 reps with cues for good control and perform slowly -    Pelvic clocks to loosen pelvis and relieve tightness    12:00 to 6:00 2 x 10 with guiding and cues  3:00 to 9:00 x 10 x   Supine bridging   2 x 10 reps   With cues to perform slowly 2 x 10 cues to increase effort and perform slowly. 2 x 10 with increase hip height with cues   Single knee to chest   3 x 20 second hold B 3 x 20 seconds each  Cues to keep leg more aligned vs in ER. Pt feels the stretch - but no sharp pain. 2 x 20 second hold each    Piriformis stretch/hip capsule stretch   3 x 20 second hold B each 3 x 20 seconds B. Pain in right knee with overpressure. Cues to grasp under thigh. 2 x 20 second B   Side lying clams 2 x 10 reps B     Supine lower trunk rotation for increase range of motion  5 x 10 seconds to each side with discomfort 5 x 10 second hold   Heel/toe raises  x20 x   Side step  x20 to each side. Cues to full WS to the side and keeping standing leg straight x   Single leg standing  3 x 10 seconds on and off hold x                 HEP: patient issued written handouts of all exercises as noted in above flow sheet. Patient with good understanding of exercises. Manual Therapy: (15 minutes) patient left side lying for STM to right low back, hip, quadratus and upper glut and then right side lying for STM to left low back and quadratus area. Palpable tightness left more than right. Modalities: (0 minutes)     Treatment/Session Assessment:  Patient reported that she is improving with treatments and working on exercises at home. Will review HEP next visit. Patients response to todays treatment session was tolerated well with no medical complications. .  · Post session pain:  NONE  · Compliance with Program/Exercises: Will assess as treatment progresses. · Recommendations/Intent for next treatment session:  \"Next visit will focus on advancements to more challenging activities\".   Total Treatment Duration:  PT Patient Time In/Time Out  Time In: 0930  Time Out: Ana Vazquez PTA

## 2017-08-17 ENCOUNTER — HOSPITAL ENCOUNTER (OUTPATIENT)
Dept: MAMMOGRAPHY | Age: 72
Discharge: HOME | End: 2017-08-17
Attending: NURSE PRACTITIONER
Payer: MEDICARE

## 2017-08-17 DIAGNOSIS — Z78.0 ASYMPTOMATIC POSTMENOPAUSAL STATE: ICD-10-CM

## 2017-08-17 DIAGNOSIS — Z12.31 ENCOUNTER FOR SCREENING MAMMOGRAM FOR BREAST CANCER: ICD-10-CM

## 2017-08-17 PROCEDURE — 77080 DXA BONE DENSITY AXIAL: CPT

## 2017-08-17 PROCEDURE — 77067 SCR MAMMO BI INCL CAD: CPT

## 2017-08-18 ENCOUNTER — HOSPITAL ENCOUNTER (OUTPATIENT)
Dept: PHYSICAL THERAPY | Age: 72
Discharge: HOME | End: 2017-08-18
Attending: NURSE PRACTITIONER
Payer: MEDICARE

## 2017-08-18 PROBLEM — M85.80 OSTEOPENIA: Status: ACTIVE | Noted: 2017-08-18

## 2017-08-18 PROCEDURE — 97110 THERAPEUTIC EXERCISES: CPT

## 2017-08-18 NOTE — PROGRESS NOTES
Geoffrey Dalton  : [facility] [address]  [phone]   [phone]          OUTPATIENT PHYSICAL THERAPY:Daily Note 2017    ICD-10: Treatment Diagnosis: Other abnormalities of gait and mobility (R26.89); Precautions/Allergies:   Heparin (porcine); Lovenox [enoxaparin]; and Sulfa (sulfonamide antibiotics)   Fall Risk Score: 1 (? 5 = High Risk)  MD Orders: PT eval/falls risk MEDICAL/REFERRING DIAGNOSIS:  At risk for falls [Z91.81]   DATE OF ONSET: 10/2016  REFERRING PHYSICIAN: Davin Devi NP  RETURN PHYSICIAN APPOINTMENT: unknown  DATE OF PROGRESS NOTE:  40  RECERTIFICATION DATE:      INITIAL ASSESSMENT:  Ms. Shorty Kirby presents with some functional weakness and decreased confidence in her balance. Pt will benefit from strengthening and high level balance and gait routine to maximize independence and safety with walking and mobility    PROBLEM LIST (Impacting functional limitations):  1. Decreased Strength  2. Decreased Ambulation Ability/Technique  3. Decreased Balance  4. Decreased Activity Tolerance  5. Increased Fatigue  6. Decreased Flexibility/Joint Mobility  7. Decreased [place] with Home Exercise Program INTERVENTIONS PLANNED:  1. Balance Exercise  2. Gait Training  3. Home Exercise Program (HEP)  4. Range of Motion (ROM)  5. Therapeutic Activites  6. Therapeutic Exercise/Strengthening   TREATMENT PLAN:  Effective Dates: 17 TO 10/22/17. Frequency/Duration: 2 times a week for 3 weeks  GOALS: (Goals have been discussed and agreed upon with patient.)  Short-Term Functional Goals: Time Frame: 6 visits  1. Pt will increase 6 minute walk time by 200 feet indicating increased functional strength and gait ability and decrease risk for fall. 2. Pt will increase Wei Balance Scale by 2 points indicating increased static standing balance and decreased risk for fall  3.  Pt will decrease her TUG time by 2 seconds indicating increase gait ability and decreased risk for fall. Rehabilitation Potential For Stated Goals: Good  Regarding Alice Byrd's therapy, I certify that the treatment plan above will be carried out by a therapist or under their direction. Thank you for this referral,  Nuno Hsu, PT               HISTORY:   GOAL:  Walk with better balance and get rid of the cane. Present Symptoms:    66YEAR OLD [place] FEMALE. fELL ABOUT 6 TIMES WHEN FIRST CAME HERE IN 2011. Last fall was beginning of last year I have ulcerative collitus and my BP dropped. Using cane went into the hospital for ulcerative collitis in Fall 2016 and in and out through the New Year. February to [facility] for rehab and then went. Then some HH therapy through ~April. Uses cane in house at times. When my stomach is acting up I feel the weakest.  Lives with  - good health. Blind in left eye since 2008. PAIN:  Some has to do with the way I sleep every now and then. A pian in my right hip and down lateral leg. I f I sit or stand or walk too much but the IT band area will hurt and is kind of numb. History of Present Injury/Illness (Reason for Referral):  As above. Past Medical History/Comorbidities:   Ms. Tonya Osuna  has a past medical history of Abnormal cardiovascular function study; Acute cholecystitis (12/17/2013); Allergic rhinitis (9/27/2012); Allergic rhinitis due to allergen (7/1/2015); Anemia (9/27/2012); Atrial fibrillation ([facility] [address] blindness to left eye; Chronic anemia (7/1/2015); Chronic kidney disease, stage III (moderate) (9/27/2012); Crohn's disease ([address] Diabetes ([address] DM2 (diabetes mellitus, type 2) ([address] (9/27/2012); Essential hypertension (7/1/2015); Gastroesophageal reflux disease without esophagitis (7/1/2015); GERD (gastroesophageal reflux disease) (9/27/2012); Heart murmur; HLD (hyperlipidemia) (9/27/2012); HTN (hypertension); Hypokalemia (9/27/2012); Hypothyroidism;  Hypothyroidism (9/27/2012); Hypothyroidism due to medication (7/1/2015); Inflammatory bowel disease (7/1/2015); Palpitations; Personal history of asthma-as a child. (8/24/2016); Type 2 diabetes mellitus without complication ([facility] [address] (0/4/1105); and Ulcerative colitis ([facility] [address] Ms. Vito Kellogg  has a past surgical history that includes partial thyroidectomy; orthopaedic; carpal tunnel release; cataract removal; heent; and lap cholecystectomy. Past Surgical History:   Procedure Laterality Date    HX CARPAL TUNNEL RELEASE      HX CATARACT REMOVAL      to L    HX HEENT      shunt to L eye r/t glaucoma    HX LAP CHOLECYSTECTOMY      HX ORTHOPAEDIC      to back    HX PARTIAL THYROIDECTOMY       Social History/Living Environment:     lives with spouse. Walking in the neighborhood in a large [employer] parking lot for about 20 minutes about 2 times per week. Prior Level of Function/Work/Activity:    Current Medications:    Current Outpatient Prescriptions:     levothyroxine (SYNTHROID) 50 mcg tablet, TAKE 1 TABLET BY MOUTH DAILY BEFORE BREAKFAST, Disp: 90 Tab, Rfl: 0    valsartan (DIOVAN) 320 mg tablet, TAKE 1 TABLET BY MOUTH EVERY DAY, Disp: 90 Tab, Rfl: 0    atorvastatin (LIPITOR) 80 mg tablet, TAKE 1 TABLET BY MOUTH EVERY DAY, Disp: 90 Tab, Rfl: 0    clonazePAM (KLONOPIN) 0.5 mg tablet, 0.25mg tab every morning and take 0.25 mg daily as needed, Disp: 30 Tab, Rfl: 2    INFLIXIMAB (REMICADE IV), by IntraVENous route., Disp: , Rfl:     NOVOLOG 100 unit/mL injection, INJECT 6 UNITS UNDER THE SKIN THREE TIMES DAILY WITH BREAKFAST, LUNCH, AND DINNER, Disp: 10 mL, Rfl: 0    magnesium oxide (MAG-OX) 400 mg tablet, Take 1 Tab by mouth daily. , Disp: 90 Tab, Rfl: 0    BD SINGLE USE SWABS REGULAR padm, USE AS DIRECTED, Disp: , Rfl: 1    dicyclomine (BENTYL) 20 mg tablet, Take 1 Tab by mouth two (2) times a day., Disp: 60 Tab, Rfl: 2    budesonide-formoterol (SYMBICORT) 160-4.5 mcg/actuation HFA inhaler, Take 2 Puffs by inhalation two (2) times a day., Disp: 1 Inhaler, Rfl: 11    glucose blood VI test strips (FREESTYLE TEST) strip, Test blood sugar QID Dx E11.69, Disp: 120 Strip, Rfl: 5    amLODIPine (NORVASC) 10 mg tablet, Take 1 Tab by mouth daily. , Disp: 90 Tab, Rfl: 3    FREESTYLE LITE METER monitoring kit, TEST BLOOD SUGAR QID BEFORE MEALS AND NIGHTLY, Disp: , Rfl: 2    ONE TOUCH DELICA 33 gauge misc, TEST BLOOD SUGAR BEFORE EACH MEAL AND HS, Disp: , Rfl: 0    FREESTYLE LANCETS 28 gauge misc, TEST BLOOD SUGAR QID BEFORE MEALS AND NIGHTLY, Disp: , Rfl: 2    Insulin Syringe-Needle U-100 0.3 mL 31 gauge x 5/16 syrg, U UTD WITH INSULIN, Disp: , Rfl: 5    glucose blood VI test strips (BLOOD GLUCOSE TEST) strip, by Does Not Apply route., Disp: , Rfl:     BD INSULIN PEN NEEDLE UF MINI 31 gauge x 3/16\" ndle, USE ONE DAILY, Disp: , Rfl: 1    potassium chloride (KLOR-CON M20) 20 mEq tablet, Take 1 Tab by mouth daily. , Disp: 90 Tab, Rfl: 3    insulin glargine (LANTUS SOLOSTAR) 100 unit/mL (3 mL) pen, 10 Units by SubCUTAneous route nightly., Disp: 1 Each, Rfl: 11    KETOTIFEN FUMARATE (EYE ITCH RELIEF OP), Apply  to eye., Disp: , Rfl:     albuterol (PROVENTIL HFA, VENTOLIN HFA, PROAIR HFA) 90 mcg/actuation inhaler, Take 2 Puffs by inhalation every four (4) hours as needed for Wheezing., Disp: 1 Inhaler, Rfl: 0    fluticasone (FLONASE) 50 mcg/actuation nasal spray, 2 Sprays by Both Nostrils route daily. , Disp: 1 Bottle, Rfl: 11   Date Last Reviewed:  8/18/2017   Number of Personal Factors/Comorbidities that affect the Plan of Care: 0: LOW COMPLEXITY   EXAMINATION:   ROM:          WFL - slight tightness in full range of shoulder flexion. Sometimes stiff in hands. Strength:          Shoulders 3+/5. Rest ok. Functional Mobility:         Gait/Ambulation:  Pt ambulates with a straight cane with a slight narrow JAVID with out going toes and feet. Pt looks down. Pt walks with less confidence without the cane.   Pt wears small wedge heel that are more narrow than her heel but she has been doing this for many years and managed decently during testing. Transfers:  independent        Bed Mobility:  NT  Mental Status:          Alert and oriented x 4;  Pleasant and appropriate. Vision:          Wears bifocal.     Body Structures Involved:  1. Eyes and Ears  2. Bones  3. Joints  4. Muscles  5. Ligaments Body Functions Affected:  1. Neuromusculoskeletal  2. Movement Related Activities and Participation Affected:  1. Learning and Applying Knowledge  2. General Tasks and Demands  3. Mobility  4. Domestic Life  5. Interpersonal Interactions and Relationships  6. Community, Social and [place]   Number of elements that affect the Plan of Care: 4+: HIGH COMPLEXITY   CLINICAL PRESENTATION:   Presentation: Stable and uncomplicated: LOW COMPLEXITY   CLINICAL DECISION MAKING:   Outcome Measure: Tool Used: 6-Minute Walk Test   Score:  Initial: 600 Feet = TBD Meters  Most Recent: TBD Feet = TBD Meters    Interpretation of Score: AGE  GENDER  MEAN  SD  NORMAL RANGE (2SD)    61-76  Male (15)   Female (22)  572   538  80   80  1-0   [phone]    66-77  Male (14)   Female (22)  527   471  80   69  46-65   [phone]    80-80  Male (8)   Female (96)  983   531  31   87  [phone]   [phone]        Tool Used: Luis Manuel Barer Balance Scale  Score:  Initial: 51/56 Most Recent: X/56 (Date: -- )   Interpretation of Score: Each section is scored on a 0-4 scale, 0 representing the patients inability to perform the task and 4 representing independence. The scores of each section are added together for a total score of 56. The higher the patients score, the more independent the patient is. Any score below 45 indicates increased risk for falls. Score 56 55-45 44-34 33-23 22-12 11-1 0   Modifier CH CI CJ CK CL CM CN     Tool Used: Timed Up and Go (TUG)  Score:  Initial: 15.31 seconds Most Recent: X seconds (Date: -- )   Interpretation of Score:  The test measures, in seconds, the time taken by an individual to stand up from a standard arm chair (seat height 46 cm [18 in], arm height 65 cm [25.6 in]), walk a distance of 3 meters (118 in, approx 10 ft), turn, walk back to the chair and sit down. If the individual takes longer than 14 seconds to complete TUG, this indicates risk for falls. Score 7 7.5-10.5 11-14 14.5-17.5 18-21 21.5-24.5 25+   Modifier CH CI CJ CK CL CM CN     ? Mobility - Walking and Moving Around:     - CURRENT STATUS: CK - 40%-59% impaired, limited or restricted    - GOAL STATUS: CJ - 20%-39% impaired, limited or restricted    - D/C STATUS:  ---------------To be determined---------------      Medical Necessity:   · Skilled intervention continues to be required due to DEBILITY. Reason for Services/Other Comments:  · Patient continues to require skilled intervention due to DEBILITY. Use of outcome tool(s) and clinical judgement create a POC that gives a: Clear prediction of patient's progress: LOW COMPLEXITY   S:  If I ever feel bad its because of my stomach. No pain today. Yes I sure do feel more stretched. TREATMENT:   (In addition to Assessment/Re-Assessment sessions the following treatments were rendered    Pre Treatment Subjective: Patient reports no pain. Therapeutic Exercise: (  60 minutes):  Exercises per grid below to improve mobility, strength, balance and coordination. Required minimal verbal cues to promote proper body alignment, promote proper body posture, promote proper body mechanics and promote proper body breathing techniques. Progressed resistance, range, repetitions and complexity of movement as indicated.    Date:  8/3/17 Date:  8/8/17   Date:  8/16/17 Date:  8/18/17   Activity/Exercise Parameters Parameters     NuStep Level 1  11 minutes Level 2  12 minutes   Level 3  10 minutes Level 3  12 minutes   LAQ   2 x 10 reps with cues for good control and perform slowly -     Pelvic clocks to loosen pelvis and relieve tightness    12:00 to 6:00 2 x 10 with guiding and cues  3:00 to 9:00 x 10 x x10 12:00 to 6:00  x10 3:00 to 9:00   Supine bridging   2 x 10 reps   With cues to perform slowly 2 x 10 cues to increase effort and perform slowly. 2 x 10 with increase hip height with cues x15   Single knee to chest   3 x 20 second hold B 3 x 20 seconds each  Cues to keep leg more aligned vs in ER. Pt feels the stretch - but no sharp pain. 2 x 20 second hold each  3 x 20 seconds each leg. Piriformis stretch/hip capsule stretch   3 x 20 second hold B each 3 x 20 seconds B. Pain in right knee with overpressure. Cues to grasp under thigh. 2 x 20 second B 1 x 30 second stretch each side. Side lying clams 2 x 10 reps B      Supine lower trunk rotation for increase range of motion  5 x 10 seconds to each side with discomfort 5 x 10 second hold 5 x 10    Heel/toe raises  x20 x x20   Side step  x20 to each side. Cues to full WS to the side and keeping standing leg straight x Rocking WS - side to side leg lifts. x10 to each side. Single leg standing  3 x 10 seconds on and off hold x 3 x 10 seconds each leg. Almost no hand hold on right leg. On and off on the left. Forward and back - lunge stretch step    x10 to each side. Holding rail. Walking the track    240' without cane. Working on larger steps. HEP: patient issued written handouts of all exercises as noted in above flow sheet. Patient with good understanding of exercises. Treatment/Session Assessment:  Patient reported that she feels more flexible and is doing exercises without difficulty at home. Increased single leg standing and improved sit to stand. Patients response to todays treatment session was tolerated well with no medical complications. .  · Post session pain:  NONE  · Compliance with Program/Exercises: Will assess as treatment progresses. · Recommendations/Intent for next treatment session:  \"Next visit will focus on advancements to more challenging activities\".   Total Treatment Duration:  PT Patient Time In/Time Out  Time In: 9317  Time Out: [address], [place]

## 2017-08-18 NOTE — PROGRESS NOTES
DEXA reveals osteopenia-bone thinning. Be sure that pt is taking 1,200 mg po OTC Calcium and 800 iu po OTC Vitamin D daily. Will need repeat DEXA in 2 years.

## 2017-08-22 ENCOUNTER — HOSPITAL ENCOUNTER (OUTPATIENT)
Dept: PHYSICAL THERAPY | Age: 72
Discharge: HOME | End: 2017-08-22
Attending: NURSE PRACTITIONER
Payer: MEDICARE

## 2017-08-22 PROCEDURE — 97110 THERAPEUTIC EXERCISES: CPT

## 2017-08-22 NOTE — PROGRESS NOTES
Aleksandr Wilson  : [facility] [address] [place] [address]  [phone]   [phone]          OUTPATIENT PHYSICAL THERAPY:Daily Note 2017    ICD-10: Treatment Diagnosis: Other abnormalities of gait and mobility (R26.89); Precautions/Allergies:   Heparin (porcine); Lovenox [enoxaparin]; and Sulfa (sulfonamide antibiotics)   Fall Risk Score: 1 (? 5 = High Risk)  MD Orders: PT eval/falls risk MEDICAL/REFERRING DIAGNOSIS:  At risk for falls [Z91.81]   DATE OF ONSET: 10/2016  REFERRING PHYSICIAN: Kirk Devi NP  RETURN PHYSICIAN APPOINTMENT: unknown  DATE OF PROGRESS NOTE:  56  RECERTIFICATION DATE: 20     INITIAL ASSESSMENT:  Ms. Francisco Javier Gerard presents with some functional weakness and decreased confidence in her balance. Pt will benefit from strengthening and high level balance and gait routine to maximize independence and safety with walking and mobility    PROBLEM LIST (Impacting functional limitations):  1. Decreased Strength  2. Decreased Ambulation Ability/Technique  3. Decreased Balance  4. Decreased Activity Tolerance  5. Increased Fatigue  6. Decreased Flexibility/Joint Mobility  7. Decreased Delta with Home Exercise Program INTERVENTIONS PLANNED:  1. Balance Exercise  2. Gait Training  3. Home Exercise Program (HEP)  4. Range of Motion (ROM)  5. Therapeutic Activites  6. Therapeutic Exercise/Strengthening   TREATMENT PLAN:  Effective Dates: 17 TO 10/22/17. Frequency/Duration: 2 times a week for 3 weeks  GOALS: (Goals have been discussed and agreed upon with patient.)  Short-Term Functional Goals: Time Frame: 6 visits  1. Pt will increase 6 minute walk time by 200 feet indicating increased functional strength and gait ability and decrease risk for fall. 2. Pt will increase Wei Balance Scale by 2 points indicating increased static standing balance and decreased risk for fall  3.  Pt will decrease her TUG time by 2 seconds indicating increase gait ability and decreased risk for fall. Rehabilitation Potential For Stated Goals: Good  Regarding Alice Byrd's therapy, I certify that the treatment plan above will be carried out by a therapist or under their direction. Thank you for this referral,  Tom Meza, PT               HISTORY:   GOAL:  Walk with better balance and get rid of the cane. Present Symptoms:    66YEAR OLD [place] FEMALE. fELL ABOUT 6 TIMES WHEN FIRST CAME HERE IN 2011. Last fall was beginning of last year I have ulcerative collitus and my BP dropped. Using cane went into the hospital for ulcerative collitis in Fall 2016 and in and out through the New Year. February to [facility] for rehab and then went. Then some HH therapy through ~April. Uses cane in house at times. When my stomach is acting up I feel the weakest.  Lives with  - good health. Blind in left eye since 2008. PAIN:  Some has to do with the way I sleep every now and then. A pian in my right hip and down lateral leg. I f I sit or stand or walk too much but the IT band area will hurt and is kind of numb. History of Present Injury/Illness (Reason for Referral):  As above. Past Medical History/Comorbidities:   Ms. Andre Calvert  has a past medical history of Abnormal cardiovascular function study; Acute cholecystitis (12/17/2013); Allergic rhinitis (9/27/2012); Allergic rhinitis due to allergen (7/1/2015); Anemia (9/27/2012); Atrial fibrillation ([facility] [address] blindness to left eye; Chronic anemia (7/1/2015); Chronic kidney disease, stage III (moderate) (9/27/2012); Crohn's disease ([address] Diabetes ([address] DM2 (diabetes mellitus, type 2) ([address] (9/27/2012); Essential hypertension (7/1/2015); Gastroesophageal reflux disease without esophagitis (7/1/2015); GERD (gastroesophageal reflux disease) (9/27/2012); Heart murmur; HLD (hyperlipidemia) (9/27/2012); HTN (hypertension); Hypokalemia (9/27/2012); Hypothyroidism;  Hypothyroidism (9/27/2012); Hypothyroidism due to medication (7/1/2015); Inflammatory bowel disease (7/1/2015); Palpitations; Personal history of asthma-as a child. (8/24/2016); Type 2 diabetes mellitus without complication ([facility] [address] (9/5/1993); and Ulcerative colitis ([facility] [address] Ms. Sun Pérez  has a past surgical history that includes partial thyroidectomy; orthopaedic; carpal tunnel release; cataract removal; heent; and lap cholecystectomy. Past Surgical History:   Procedure Laterality Date    HX CARPAL TUNNEL RELEASE      HX CATARACT REMOVAL      to L    HX HEENT      shunt to L eye r/t glaucoma    HX LAP CHOLECYSTECTOMY      HX ORTHOPAEDIC      to back    HX PARTIAL THYROIDECTOMY       Social History/Living Environment:     lives with spouse. Walking in the neighborhood in a large [employer] parking lot for about 20 minutes about 2 times per week. Prior Level of Function/Work/Activity:    Current Medications:    Current Outpatient Prescriptions:     levothyroxine (SYNTHROID) 50 mcg tablet, TAKE 1 TABLET BY MOUTH DAILY BEFORE BREAKFAST, Disp: 90 Tab, Rfl: 0    valsartan (DIOVAN) 320 mg tablet, TAKE 1 TABLET BY MOUTH EVERY DAY, Disp: 90 Tab, Rfl: 0    atorvastatin (LIPITOR) 80 mg tablet, TAKE 1 TABLET BY MOUTH EVERY DAY, Disp: 90 Tab, Rfl: 0    clonazePAM (KLONOPIN) 0.5 mg tablet, 0.25mg tab every morning and take 0.25 mg daily as needed, Disp: 30 Tab, Rfl: 2    INFLIXIMAB (REMICADE IV), by IntraVENous route., Disp: , Rfl:     NOVOLOG 100 unit/mL injection, INJECT 6 UNITS UNDER THE SKIN THREE TIMES DAILY WITH BREAKFAST, LUNCH, AND DINNER, Disp: 10 mL, Rfl: 0    magnesium oxide (MAG-OX) 400 mg tablet, Take 1 Tab by mouth daily. , Disp: 90 Tab, Rfl: 0    BD SINGLE USE SWABS REGULAR padm, USE AS DIRECTED, Disp: , Rfl: 1    dicyclomine (BENTYL) 20 mg tablet, Take 1 Tab by mouth two (2) times a day., Disp: 60 Tab, Rfl: 2    budesonide-formoterol (SYMBICORT) 160-4.5 mcg/actuation HFA inhaler, Take 2 Puffs by inhalation two (2) times a day., Disp: 1 Inhaler, Rfl: 11    glucose blood VI test strips (FREESTYLE TEST) strip, Test blood sugar QID Dx E11.69, Disp: 120 Strip, Rfl: 5    amLODIPine (NORVASC) 10 mg tablet, Take 1 Tab by mouth daily. , Disp: 90 Tab, Rfl: 3    FREESTYLE LITE METER monitoring kit, TEST BLOOD SUGAR QID BEFORE MEALS AND NIGHTLY, Disp: , Rfl: 2    ONE TOUCH DELICA 33 gauge misc, TEST BLOOD SUGAR BEFORE EACH MEAL AND HS, Disp: , Rfl: 0    FREESTYLE LANCETS 28 gauge misc, TEST BLOOD SUGAR QID BEFORE MEALS AND NIGHTLY, Disp: , Rfl: 2    Insulin Syringe-Needle U-100 0.3 mL 31 gauge x 5/16 syrg, U UTD WITH INSULIN, Disp: , Rfl: 5    glucose blood VI test strips (BLOOD GLUCOSE TEST) strip, by Does Not Apply route., Disp: , Rfl:     BD INSULIN PEN NEEDLE UF MINI 31 gauge x 3/16\" ndle, USE ONE DAILY, Disp: , Rfl: 1    potassium chloride (KLOR-CON M20) 20 mEq tablet, Take 1 Tab by mouth daily. , Disp: 90 Tab, Rfl: 3    insulin glargine (LANTUS SOLOSTAR) 100 unit/mL (3 mL) pen, 10 Units by SubCUTAneous route nightly., Disp: 1 Each, Rfl: 11    KETOTIFEN FUMARATE (EYE ITCH RELIEF OP), Apply  to eye., Disp: , Rfl:     albuterol (PROVENTIL HFA, VENTOLIN HFA, PROAIR HFA) 90 mcg/actuation inhaler, Take 2 Puffs by inhalation every four (4) hours as needed for Wheezing., Disp: 1 Inhaler, Rfl: 0    fluticasone (FLONASE) 50 mcg/actuation nasal spray, 2 Sprays by Both Nostrils route daily. , Disp: 1 Bottle, Rfl: 11   Date Last Reviewed:  8/22/2017   Number of Personal Factors/Comorbidities that affect the Plan of Care: 0: LOW COMPLEXITY   EXAMINATION:   ROM:          WFL - slight tightness in full range of shoulder flexion. Sometimes stiff in hands. Strength:          Shoulders 3+/5. Rest ok. Functional Mobility:         Gait/Ambulation:  Pt ambulates with a straight cane with a slight narrow JAVID with out going toes and feet. Pt looks down. Pt walks with less confidence without the cane.   Pt wears small wedge heel that are more narrow than her heel but she has been doing this for many years and managed decently during testing. Transfers:  independent        Bed Mobility:  NT  Mental Status:          Alert and oriented x 4;  Pleasant and appropriate. Vision:          Wears bifocal.     Body Structures Involved:  1. Eyes and Ears  2. Bones  3. Joints  4. Muscles  5. Ligaments Body Functions Affected:  1. Neuromusculoskeletal  2. Movement Related Activities and Participation Affected:  1. Learning and Applying Knowledge  2. General Tasks and Demands  3. Mobility  4. Domestic Life  5. Interpersonal Interactions and Relationships  6. Community, Social and [place]   Number of elements that affect the Plan of Care: 4+: HIGH COMPLEXITY   CLINICAL PRESENTATION:   Presentation: Stable and uncomplicated: LOW COMPLEXITY   CLINICAL DECISION MAKING:   Outcome Measure: Tool Used: 6-Minute Walk Test   Score:  Initial: 600 Feet = TBD Meters  Most Recent: TBD Feet = TBD Meters    Interpretation of Score: AGE  GENDER  MEAN  SD  NORMAL RANGE (2SD)    61-76  Male (15)   Female (22)  572   538  80   80  1-0   [phone]    66-77  Male (14)   Female (22)  527   471  80   69  46-65   [phone]    80-80  Male (8)   Female (29)  732   860  98   55  265-326   [phone]        Tool Used: [facility] Safer Balance Scale  Score:  Initial: 51/56 Most Recent: X/56 (Date: -- )   Interpretation of Score: Each section is scored on a 0-4 scale, 0 representing the patients inability to perform the task and 4 representing independence. The scores of each section are added together for a total score of 56. The higher the patients score, the more independent the patient is. Any score below 45 indicates increased risk for falls. Score 56 55-45 44-34 33-23 22-12 11-1 0   Modifier CH CI CJ CK CL CM CN     Tool Used: Timed Up and Go (TUG)  Score:  Initial: 15.31 seconds Most Recent: X seconds (Date: -- )   Interpretation of Score:  The test measures, in seconds, the time taken by an individual to stand up from a standard arm chair (seat height 46 cm [18 in], arm height 65 cm [25.6 in]), walk a distance of 3 meters (118 in, approx 10 ft), turn, walk back to the chair and sit down. If the individual takes longer than 14 seconds to complete TUG, this indicates risk for falls. Score 7 7.5-10.5 11-14 14.5-17.5 18-21 21.5-24.5 25+   Modifier CH CI CJ CK CL CM CN     ? Mobility - Walking and Moving Around:     - CURRENT STATUS: CK - 40%-59% impaired, limited or restricted    - GOAL STATUS: CJ - 20%-39% impaired, limited or restricted    - D/C STATUS:  ---------------To be determined---------------      Medical Necessity:   · Skilled intervention continues to be required due to DEBILITY. Reason for Services/Other Comments:  · Patient continues to require skilled intervention due to DEBILITY. Use of outcome tool(s) and clinical judgement create a POC that gives a: Clear prediction of patient's progress: LOW COMPLEXITY   S:  No pain today. I believe it has helped a bit. I don't have the same pain along my back and side (right low back)      TREATMENT:   (In addition to Assessment/Re-Assessment sessions the following treatments were rendered    Pre Treatment Subjective: Patient reports no pain. Therapeutic Exercise: (  50 minutes):  Exercises per grid below to improve mobility, strength, balance and coordination. Required minimal verbal cues to promote proper body alignment, promote proper body posture, promote proper body mechanics and promote proper body breathing techniques. Progressed resistance, range, repetitions and complexity of movement as indicated.    Date:  8/16/17 Date:  8/18/17 Date:  8/22/17   Activity/Exercise      NuStep Level 3  10 minutes Level 3  12 minutes Level 3  12 minutes   LAQ        Pelvic clocks to loosen pelvis and relieve tightness  x x10 12:00 to 6:00  x10 3:00 to 9:00 x10 12:00 to 6:00  x10 3:00 to 9:00   Supine bridging   2 x 10 with increase hip height with cues x15 x15   Single knee to chest   2 x 20 second hold each  3 x 20 seconds each leg. 3 x 20 seconds each leg. Piriformis stretch/hip capsule stretch   2 x 20 second B 1 x 30 second stretch each side. 1 x 30 seconds    Supine lower trunk rotation for increase range of motion 5 x 10 second hold 5 x 10  5 x 10 seconds   Heel/toe raises x x20 x20   Side step x Rocking WS - side to side leg lifts. x10 to each side. x15 good ability. Using target dots for foot placement         Single leg standing x 3 x 10 seconds each leg. Almost no hand hold on right leg. On and off on the left. 3 x 10 seconds. Occ tap to the wall for balance both legs. Forward and back - lunge stretch step  x10 to each side. Holding rail. x10 each leg   Walking the track  240' without cane. Working on larger steps. 120'. Increased bijal. Cues to look up. States when she turns she sometimes has some imbalance. Side step and 360* turns   Side step/1/4 turn x 10 to each side. 360* 1/4 turns x 1 each way. HEP: patient issued written handouts of all exercises as noted in above flow sheet. Patient with good understanding of exercises. Treatment/Session Assessment: Good progress. Should be ready for discharge at next visit. Patients response to todays treatment session was tolerated well with no medical complications. .  · Post session pain:  NONE  · Compliance with Program/Exercises: Will assess as treatment progresses. · Recommendations/Intent for next treatment session: \"Next visit will focus on advancements to more challenging activities\".   Total Treatment Duration:  PT Patient Time In/Time Out  Time In: 0900  Time Out: Ravinder Landa [id number], PT

## 2017-08-25 ENCOUNTER — HOSPITAL ENCOUNTER (OUTPATIENT)
Dept: PHYSICAL THERAPY | Age: 72
Discharge: HOME | End: 2017-08-25
Attending: NURSE PRACTITIONER
Payer: MEDICARE

## 2017-08-25 PROCEDURE — 97110 THERAPEUTIC EXERCISES: CPT

## 2017-08-25 PROCEDURE — G8980 MOBILITY D/C STATUS: HCPCS

## 2017-08-25 PROCEDURE — 97530 THERAPEUTIC ACTIVITIES: CPT

## 2017-08-25 PROCEDURE — G8979 MOBILITY GOAL STATUS: HCPCS

## 2017-08-25 NOTE — PROGRESS NOTES
Marcie Wei  : [facility] [address]  [phone]   [phone]          OUTPATIENT PHYSICAL THERAPY:Discharge 2017    ICD-10: Treatment Diagnosis: Other abnormalities of gait and mobility (R26.89); Precautions/Allergies:   Heparin (porcine); Lovenox [enoxaparin]; and Sulfa (sulfonamide antibiotics)   Fall Risk Score: 1 (? 5 = High Risk)  MD Orders: PT eval/falls risk MEDICAL/REFERRING DIAGNOSIS:  At risk for falls [Z91.81]   DATE OF ONSET: 10/2016  REFERRING PHYSICIAN: Korey SHIELDS NP  RETURN PHYSICIAN APPOINTMENT: unknown  DATE OF PROGRESS NOTE:    RECERTIFICATION DATE:      DISCHARGE ASSESSMENT:  Pt has attended 7 physical therapy sessions from 17 to date including initial assessment. Sessions consist of range of motion, therapeutic activity, therapeutic exercise, balance and gait refinement. Pt has shown excellent improvement in BALANCE and gait ability . Pt has increased Wei Balance Score by 3 points indicating improved static standing balance and decreased risk for fall. Pt has decreased TUG time by nearly 4 seconds indicating more normalized walking pattern. Pt has increased the 6 minute walk test by 350' indicating improved walking balance and decreased risk for fall. INITIAL ASSESSMENT:  Ms. Anna Julian presents with some functional weakness and decreased confidence in her balance. Pt will benefit from strengthening and high level balance and gait routine to maximize independence and safety with walking and mobility    PROBLEM LIST (Impacting functional limitations):  1. Decreased Strength  2. Decreased Ambulation Ability/Technique  3. Decreased Balance  4. Decreased Activity Tolerance  5. Increased Fatigue  6. Decreased Flexibility/Joint Mobility  7. Decreased [place] with Home Exercise Program INTERVENTIONS PLANNED:  1. Balance Exercise  2. Gait Training  3.  Home Exercise Program (HEP)  4. Range of Motion (ROM)  5. Therapeutic Activites  6. Therapeutic Exercise/Strengthening   TREATMENT PLAN:  Effective Dates: 7/27/17 TO 10/22/17. Frequency/Duration: 2 times a week for 3 weeks  GOALS: (Goals have been discussed and agreed upon with patient.)  Short-Term Functional Goals: Time Frame: 6 visits  1. Pt will increase 6 minute walk time by 200 feet indicating increased functional strength and gait ability and decrease risk for fall. STATUS:  MET  2. Pt will increase Wei Balance Scale by 2 points indicating increased static standing balance and decreased risk for fall. STATUS:  MET  3. Pt will decrease her TUG time by 2 seconds indicating increase gait ability and decreased risk for fall. STATUS:  MET  Rehabilitation Potential For Stated Goals: Good  Regarding Alice Byrd's therapy, I certify that the treatment plan above will be carried out by a therapist or under their direction. Thank you for this referral,  Jolynn Sigala, PT               HISTORY:   GOAL:  Walk with better balance and get rid of the cane. 8/25/2017  I think I am doing ok. In the house I don't use it. And now sometimes we may jump in the car to go grab something and I forget the cane. So I guess you don't really need it if you forget it. Present Symptoms:    66YEAR OLD [place] FEMALE. fELL ABOUT 6 TIMES WHEN FIRST CAME HERE IN 2011. Last fall was beginning of last year I have ulcerative collitus and my BP dropped. Using cane went into the hospital for ulcerative collitis in Fall 2016 and in and out through the New Year. February to [facility] for rehab and then went. Then some  therapy through ~April. Uses cane in house at times. When my stomach is acting up I feel the weakest.  Lives with  - good health. Blind in left eye since 2008. PAIN:  Some has to do with the way I sleep every now and then. A pian in my right hip and down lateral leg.   I f I sit or stand or walk too much but the IT band area will hurt and is kind of numb. History of Present Injury/Illness (Reason for Referral):  As above. Past Medical History/Comorbidities:   Ms. Sweetie Crystal  has a past medical history of Abnormal cardiovascular function study; Acute cholecystitis (12/17/2013); Allergic rhinitis (9/27/2012); Allergic rhinitis due to allergen (7/1/2015); Anemia (9/27/2012); Atrial fibrillation ([address] blindness to left eye; Chronic anemia (7/1/2015); Chronic kidney disease, stage III (moderate) (9/27/2012); Crohn's disease ([address] Diabetes ([address] DM2 (diabetes mellitus, type 2) ([address] (9/27/2012); Essential hypertension (7/1/2015); Gastroesophageal reflux disease without esophagitis (7/1/2015); GERD (gastroesophageal reflux disease) (9/27/2012); Heart murmur; HLD (hyperlipidemia) (9/27/2012); HTN (hypertension); Hypokalemia (9/27/2012); Hypothyroidism; Hypothyroidism (9/27/2012); Hypothyroidism due to medication (7/1/2015); Inflammatory bowel disease (7/1/2015); Palpitations; Personal history of asthma-as a child. (8/24/2016); Type 2 diabetes mellitus without complication ([address] (6/1/8157); and Ulcerative colitis ([address] Ms. Sweetie Crystal  has a past surgical history that includes partial thyroidectomy; orthopaedic; carpal tunnel release; cataract removal; heent; and lap cholecystectomy. Past Surgical History:   Procedure Laterality Date    HX CARPAL TUNNEL RELEASE      HX CATARACT REMOVAL      to L    HX HEENT      shunt to L eye r/t glaucoma    HX LAP CHOLECYSTECTOMY      HX ORTHOPAEDIC      to back    HX PARTIAL THYROIDECTOMY       Social History/Living Environment:     lives with spouse. Walking in the neighborhood in a large [employer] parking lot for about 20 minutes about 2 times per week.     Prior Level of Function/Work/Activity:    Current Medications:    Current Outpatient Prescriptions:     levothyroxine (SYNTHROID) 50 mcg tablet, TAKE 1 TABLET BY MOUTH DAILY BEFORE BREAKFAST, Disp: 90 Tab, Rfl: 0    valsartan (DIOVAN) 320 mg tablet, TAKE 1 TABLET BY MOUTH EVERY DAY, Disp: 90 Tab, Rfl: 0    atorvastatin (LIPITOR) 80 mg tablet, TAKE 1 TABLET BY MOUTH EVERY DAY, Disp: 90 Tab, Rfl: 0    clonazePAM (KLONOPIN) 0.5 mg tablet, 0.25mg tab every morning and take 0.25 mg daily as needed, Disp: 30 Tab, Rfl: 2    INFLIXIMAB (REMICADE IV), by IntraVENous route., Disp: , Rfl:     NOVOLOG 100 unit/mL injection, INJECT 6 UNITS UNDER THE SKIN THREE TIMES DAILY WITH BREAKFAST, LUNCH, AND DINNER, Disp: 10 mL, Rfl: 0    magnesium oxide (MAG-OX) 400 mg tablet, Take 1 Tab by mouth daily. , Disp: 90 Tab, Rfl: 0    BD SINGLE USE SWABS REGULAR padm, USE AS DIRECTED, Disp: , Rfl: 1    dicyclomine (BENTYL) 20 mg tablet, Take 1 Tab by mouth two (2) times a day., Disp: 60 Tab, Rfl: 2    budesonide-formoterol (SYMBICORT) 160-4.5 mcg/actuation HFA inhaler, Take 2 Puffs by inhalation two (2) times a day., Disp: 1 Inhaler, Rfl: 11    glucose blood VI test strips (FREESTYLE TEST) strip, Test blood sugar QID Dx E11.69, Disp: 120 Strip, Rfl: 5    amLODIPine (NORVASC) 10 mg tablet, Take 1 Tab by mouth daily. , Disp: 90 Tab, Rfl: 3    FREESTYLE LITE METER monitoring kit, TEST BLOOD SUGAR QID BEFORE MEALS AND NIGHTLY, Disp: , Rfl: 2    ONE TOUCH DELICA 33 gauge misc, TEST BLOOD SUGAR BEFORE EACH MEAL AND HS, Disp: , Rfl: 0    FREESTYLE LANCETS 28 gauge misc, TEST BLOOD SUGAR QID BEFORE MEALS AND NIGHTLY, Disp: , Rfl: 2    Insulin Syringe-Needle U-100 0.3 mL 31 gauge x 5/16 syrg, U UTD WITH INSULIN, Disp: , Rfl: 5    glucose blood VI test strips (BLOOD GLUCOSE TEST) strip, by Does Not Apply route., Disp: , Rfl:     BD INSULIN PEN NEEDLE UF MINI 31 gauge x 3/16\" ndle, USE ONE DAILY, Disp: , Rfl: 1    potassium chloride (KLOR-CON M20) 20 mEq tablet, Take 1 Tab by mouth daily. , Disp: 90 Tab, Rfl: 3    insulin glargine (LANTUS SOLOSTAR) 100 unit/mL (3 mL) pen, 10 Units by SubCUTAneous route nightly., Disp: 1 Each, Rfl: 11    KETOTIFEN FUMARATE (EYE [facility] OP), Apply  to eye., Disp: , Rfl:     albuterol (PROVENTIL HFA, VENTOLIN HFA, PROAIR HFA) 90 mcg/actuation inhaler, Take 2 Puffs by inhalation every four (4) hours as needed for Wheezing., Disp: 1 Inhaler, Rfl: 0    fluticasone (FLONASE) 50 mcg/actuation nasal spray, 2 Sprays by Both Nostrils route daily. , Disp: 1 Bottle, Rfl: 11   Date Last Reviewed:  8/25/2017   Number of Personal Factors/Comorbidities that affect the Plan of Care: 0: LOW COMPLEXITY   EXAMINATION:   ROM:          WFL - slight tightness in full range of shoulder flexion. Sometimes stiff in hands. Strength:          Shoulders 3+/5. Rest ok. Functional Mobility:         Gait/Ambulation:  Pt ambulates with a straight cane with a slight narrow JAVID with out going toes and feet. Pt looks down. Pt walks with less confidence without the cane. Pt wears small wedge heel that are more narrow than her heel but she has been doing this for many years and managed decently during testing. Transfers:  independent        Bed Mobility:  NT  Mental Status:          Alert and oriented x 4;  Pleasant and appropriate. Vision:          Wears bifocal.     Body Structures Involved:  1. Eyes and Ears  2. Bones  3. Joints  4. Muscles  5. Ligaments Body Functions Affected:  1. Neuromusculoskeletal  2. Movement Related Activities and Participation Affected:  1. Learning and Applying Knowledge  2. General Tasks and Demands  3. Mobility  4. Domestic Life  5. Interpersonal Interactions and Relationships  6. Community, Social and [place]   Number of elements that affect the Plan of Care: 4+: HIGH COMPLEXITY   CLINICAL PRESENTATION:   Presentation: Stable and uncomplicated: LOW COMPLEXITY   CLINICAL DECISION MAKING:   Outcome Measure:    Tool Used: 6-Minute Walk Test   Score:  Initial: 600 Feet Most Recent: 950 Feet  (8/25/17)     Tool Used: Harris Balance Scale  Score:  Initial: 51/56 Most Recent: 53/56 (Date: 8/25/17 )   Interpretation of Score: Each section is scored on a 0-4 scale, 0 representing the patients inability to perform the task and 4 representing independence. The scores of each section are added together for a total score of 56. The higher the patients score, the more independent the patient is. Any score below 45 indicates increased risk for falls. Score 56 55-45 44-34 33-23 22-12 11-1 0   Modifier CH CI CJ CK CL CM CN     Tool Used: Timed Up and Go (TUG)  Score:  Initial: 15.31 seconds Most Recent: 11.42 seconds (Date: 8/25/17)   Interpretation of Score: The test measures, in seconds, the time taken by an individual to stand up from a standard arm chair (seat height 46 cm [18 in], arm height 65 cm [25.6 in]), walk a distance of 3 meters (118 in, approx 10 ft), turn, walk back to the chair and sit down. If the individual takes longer than 14 seconds to complete TUG, this indicates risk for falls. Score 7 7.5-10.5 11-14 14.5-17.5 18-21 21.5-24.5 25+   Modifier CH CI CJ CK CL CM CN     ? Mobility - Walking and Moving Around:     - CURRENT STATUS: CJ - 20%-39% impaired, limited or restricted    - GOAL STATUS: CJ - 20%-39% impaired, limited or restricted    - D/C STATUS:  CJ - 20%-39% impaired, limited or restricted     Medical Necessity:   · Skilled intervention continues to be required due to DEBILITY. Reason for Services/Other Comments:  · Patient continues to require skilled intervention due to DEBILITY. Use of outcome tool(s) and clinical judgement create a POC that gives a: Clear prediction of patient's progress: LOW COMPLEXITY   S:  No pain today. I believe it has helped a bit. I don't have the same pain along my back and side (right low back)      TREATMENT:   (In addition to Assessment/Re-Assessment sessions the following treatments were rendered    Pre Treatment Subjective: Patient reports no pain.    Therapeutic Exercise: (  12 minutes):  Exercises per grid below to improve mobility, strength, balance and coordination. Required minimal verbal cues to promote proper body alignment, promote proper body posture, promote proper body mechanics and promote proper body breathing techniques. Progressed resistance, range, repetitions and complexity of movement as indicated. THERAPEUTIC ACTIVITY: ( 28 minutes): Therapeutic activities above and  per grid below to assess and improve mobility, strength, balance and coordination. Required no  cues to perform activities today. Date:  8/25/17   Activity/Exercise    NuStep Level 3  12 minutes     HEP: patient issued written handouts of all exercises as noted in above flow sheet. Patient with good understanding of exercises. Treatment/Session Assessment: See discharge assessment. Patients response to todays treatment session was tolerated well with no medical complications.   .  · Post session pain:  NONE    Total Treatment Duration:  PT Patient Time In/Time Out  Time In: 0940  Time Out: [facility],

## 2017-09-20 ENCOUNTER — HOSPITAL ENCOUNTER (OUTPATIENT)
Dept: LAB | Age: 72
Discharge: HOME | End: 2017-09-20
Payer: MEDICARE

## 2017-09-20 DIAGNOSIS — I82.402 DEEP VEIN THROMBOSIS (DVT) OF LEFT LOWER EXTREMITY, UNSPECIFIED CHRONICITY, UNSPECIFIED VEIN (HCC): ICD-10-CM

## 2017-09-20 LAB
ALBUMIN SERPL-MCNC: 3.3 G/DL (ref 3.2–4.6)
ALBUMIN/GLOB SERPL: 0.6 {RATIO} (ref 1.2–3.5)
ALP SERPL-CCNC: 237 U/L (ref 50–136)
ALT SERPL-CCNC: 58 U/L (ref 12–65)
ANION GAP SERPL CALC-SCNC: 5 MMOL/L (ref 7–16)
AST SERPL-CCNC: 52 U/L (ref 15–37)
BASOPHILS # BLD: 0 K/UL (ref 0–0.2)
BASOPHILS NFR BLD: 0 % (ref 0–2)
BILIRUB SERPL-MCNC: 0.3 MG/DL (ref 0.2–1.1)
BUN SERPL-MCNC: 17 MG/DL (ref 8–23)
CALCIUM SERPL-MCNC: 8.6 MG/DL (ref 8.3–10.4)
CHLORIDE SERPL-SCNC: 107 MMOL/L (ref 98–107)
CO2 SERPL-SCNC: 28 MMOL/L (ref 21–32)
CREAT SERPL-MCNC: 0.94 MG/DL (ref 0.6–1)
DIFFERENTIAL METHOD BLD: ABNORMAL
EOSINOPHIL # BLD: 0.2 K/UL (ref 0–0.8)
EOSINOPHIL NFR BLD: 4 % (ref 0.5–7.8)
ERYTHROCYTE [DISTWIDTH] IN BLOOD BY AUTOMATED COUNT: 13.9 % (ref 11.9–14.6)
GLOBULIN SER CALC-MCNC: 5.3 G/DL (ref 2.3–3.5)
GLUCOSE SERPL-MCNC: 84 MG/DL (ref 65–100)
HCT VFR BLD AUTO: 37.7 % (ref 35.8–46.3)
HGB BLD-MCNC: 12.1 G/DL (ref 11.7–15.4)
LYMPHOCYTES # BLD: 2.9 K/UL (ref 0.5–4.6)
LYMPHOCYTES NFR BLD: 52 % (ref 13–44)
MCH RBC QN AUTO: 27.1 PG (ref 26.1–32.9)
MCHC RBC AUTO-ENTMCNC: 32.1 G/DL (ref 31.4–35)
MCV RBC AUTO: 84.3 FL (ref 79.6–97.8)
MONOCYTES # BLD: 0.7 K/UL (ref 0.1–1.3)
MONOCYTES NFR BLD: 12 % (ref 4–12)
NEUTS SEG # BLD: 1.9 K/UL (ref 1.7–8.2)
NEUTS SEG NFR BLD: 33 % (ref 43–78)
NRBC # BLD: 0.01 K/UL (ref 0–0.2)
PLATELET # BLD AUTO: 199 K/UL (ref 150–450)
PMV BLD AUTO: 11.9 FL (ref 10.8–14.1)
POTASSIUM SERPL-SCNC: 3.4 MMOL/L (ref 3.5–5.1)
PROT SERPL-MCNC: 8.6 G/DL (ref 6.3–8.2)
RBC # BLD AUTO: 4.47 M/UL (ref 4.05–5.25)
SODIUM SERPL-SCNC: 140 MMOL/L (ref 136–145)
WBC # BLD AUTO: 5.7 K/UL (ref 4.3–11.1)

## 2017-09-20 PROCEDURE — 85025 COMPLETE CBC W/AUTO DIFF WBC: CPT | Performed by: INTERNAL MEDICINE

## 2017-09-20 PROCEDURE — 80053 COMPREHEN METABOLIC PANEL: CPT | Performed by: INTERNAL MEDICINE

## 2017-09-20 PROCEDURE — 36415 COLL VENOUS BLD VENIPUNCTURE: CPT | Performed by: INTERNAL MEDICINE

## 2017-10-03 PROBLEM — E83.42 HYPOMAGNESEMIA: Status: ACTIVE | Noted: 2017-10-03

## 2017-11-15 PROBLEM — H60.332 CHRONIC SWIMMER'S EAR OF LEFT SIDE: Status: RESOLVED | Noted: 2017-02-24 | Resolved: 2017-11-15

## 2017-11-15 PROBLEM — R10.9 RIGHT SIDED ABDOMINAL PAIN: Status: RESOLVED | Noted: 2017-04-18 | Resolved: 2017-11-15

## 2017-11-15 PROBLEM — K51.018: Status: ACTIVE | Noted: 2017-11-15

## 2018-01-05 PROBLEM — E11.21 TYPE 2 DIABETES MELLITUS WITH NEPHROPATHY (HCC): Status: ACTIVE | Noted: 2018-01-05

## 2018-03-22 ENCOUNTER — HOSPITAL ENCOUNTER (OUTPATIENT)
Dept: ULTRASOUND IMAGING | Age: 73
Discharge: HOME | End: 2018-03-22
Attending: FAMILY MEDICINE
Payer: MEDICARE

## 2018-03-22 DIAGNOSIS — R74.8 ELEVATED ALKALINE PHOSPHATASE LEVEL: ICD-10-CM

## 2018-03-22 PROCEDURE — 76705 ECHO EXAM OF ABDOMEN: CPT

## 2018-04-02 ENCOUNTER — APPOINTMENT (OUTPATIENT)
Dept: GENERAL RADIOLOGY | Age: 73
End: 2018-04-02
Attending: EMERGENCY MEDICINE
Payer: MEDICARE

## 2018-04-02 ENCOUNTER — HOSPITAL ENCOUNTER (EMERGENCY)
Age: 73
Discharge: HOME | End: 2018-04-02
Attending: EMERGENCY MEDICINE
Payer: MEDICARE

## 2018-04-02 VITALS
TEMPERATURE: 98.3 F | OXYGEN SATURATION: 100 % | BODY MASS INDEX: 30.02 KG/M2 | DIASTOLIC BLOOD PRESSURE: 67 MMHG | HEIGHT: 61 IN | HEART RATE: 72 BPM | RESPIRATION RATE: 18 BRPM | WEIGHT: 159 LBS | SYSTOLIC BLOOD PRESSURE: 142 MMHG

## 2018-04-02 DIAGNOSIS — M25.522 LEFT ELBOW PAIN: Primary | ICD-10-CM

## 2018-04-02 DIAGNOSIS — R42 DIZZINESS: ICD-10-CM

## 2018-04-02 LAB
ALBUMIN SERPL-MCNC: 3.3 G/DL (ref 3.2–4.6)
ALBUMIN/GLOB SERPL: 0.7 {RATIO} (ref 1.2–3.5)
ALP SERPL-CCNC: 139 U/L (ref 50–136)
ALT SERPL-CCNC: 43 U/L (ref 12–65)
ANION GAP SERPL CALC-SCNC: 8 MMOL/L (ref 7–16)
AST SERPL-CCNC: 34 U/L (ref 15–37)
BASOPHILS # BLD: 0 K/UL (ref 0–0.2)
BASOPHILS NFR BLD: 1 % (ref 0–2)
BILIRUB SERPL-MCNC: 0.4 MG/DL (ref 0.2–1.1)
BUN SERPL-MCNC: 19 MG/DL (ref 8–23)
CALCIUM SERPL-MCNC: 8.8 MG/DL (ref 8.3–10.4)
CHLORIDE SERPL-SCNC: 107 MMOL/L (ref 98–107)
CO2 SERPL-SCNC: 26 MMOL/L (ref 21–32)
CREAT SERPL-MCNC: 1.06 MG/DL (ref 0.6–1)
DIFFERENTIAL METHOD BLD: NORMAL
EOSINOPHIL # BLD: 0.2 K/UL (ref 0–0.8)
EOSINOPHIL NFR BLD: 4 % (ref 0.5–7.8)
ERYTHROCYTE [DISTWIDTH] IN BLOOD BY AUTOMATED COUNT: 14 % (ref 11.9–14.6)
GLOBULIN SER CALC-MCNC: 5 G/DL (ref 2.3–3.5)
GLUCOSE SERPL-MCNC: 198 MG/DL (ref 65–100)
HCT VFR BLD AUTO: 39.7 % (ref 35.8–46.3)
HGB BLD-MCNC: 12.9 G/DL (ref 11.7–15.4)
IMM GRANULOCYTES # BLD: 0 K/UL (ref 0–0.5)
IMM GRANULOCYTES NFR BLD AUTO: 0 % (ref 0–5)
LYMPHOCYTES # BLD: 2.8 K/UL (ref 0.5–4.6)
LYMPHOCYTES NFR BLD: 44 % (ref 13–44)
MCH RBC QN AUTO: 27.6 PG (ref 26.1–32.9)
MCHC RBC AUTO-ENTMCNC: 32.5 G/DL (ref 31.4–35)
MCV RBC AUTO: 84.8 FL (ref 79.6–97.8)
MONOCYTES # BLD: 0.5 K/UL (ref 0.1–1.3)
MONOCYTES NFR BLD: 8 % (ref 4–12)
NEUTS SEG # BLD: 2.7 K/UL (ref 1.7–8.2)
NEUTS SEG NFR BLD: 43 % (ref 43–78)
PLATELET # BLD AUTO: 254 K/UL (ref 150–450)
PMV BLD AUTO: 10.9 FL (ref 10.8–14.1)
POTASSIUM SERPL-SCNC: 3.5 MMOL/L (ref 3.5–5.1)
PROT SERPL-MCNC: 8.3 G/DL (ref 6.3–8.2)
RBC # BLD AUTO: 4.68 M/UL (ref 4.05–5.25)
SODIUM SERPL-SCNC: 141 MMOL/L (ref 136–145)
WBC # BLD AUTO: 6.3 K/UL (ref 4.3–11.1)

## 2018-04-02 PROCEDURE — 73080 X-RAY EXAM OF ELBOW: CPT

## 2018-04-02 PROCEDURE — 96360 HYDRATION IV INFUSION INIT: CPT | Performed by: EMERGENCY MEDICINE

## 2018-04-02 PROCEDURE — 80053 COMPREHEN METABOLIC PANEL: CPT | Performed by: EMERGENCY MEDICINE

## 2018-04-02 PROCEDURE — 99284 EMERGENCY DEPT VISIT MOD MDM: CPT | Performed by: EMERGENCY MEDICINE

## 2018-04-02 PROCEDURE — 85025 COMPLETE CBC W/AUTO DIFF WBC: CPT | Performed by: EMERGENCY MEDICINE

## 2018-04-02 PROCEDURE — 74011250636 HC RX REV CODE- 250/636: Performed by: EMERGENCY MEDICINE

## 2018-04-02 PROCEDURE — 74011250637 HC RX REV CODE- 250/637: Performed by: EMERGENCY MEDICINE

## 2018-04-02 RX ORDER — NAPROXEN 250 MG/1
500 TABLET ORAL
Status: COMPLETED | OUTPATIENT
Start: 2018-04-02 | End: 2018-04-02

## 2018-04-02 RX ADMIN — SODIUM CHLORIDE 1000 ML: 900 INJECTION, SOLUTION INTRAVENOUS at 11:37

## 2018-04-02 RX ADMIN — NAPROXEN 500 MG: 250 TABLET ORAL at 11:24

## 2018-04-02 NOTE — ED PROVIDER NOTES
CDNotes Templates                             Emergency Department     Chief Complaint:  Left arm pain  HPI:  26-year-old female presents to the emergency department with pain in the left elbow for the last 4 days. She had a blood clot in her leg and in her chest.  No clots in the arms. No port or central lines in her history    No trauma. No falls. No overuse reported    Patient complains of pain in the left elbow  Severity of pain is described as mild to moderate  The patient is right hand dominant. Historian:   patient  Review of Systems:  Include pertinent positives and negatives. CONST: Denies: fever  MUSC: Per HPI  SKIN:  Denies: rash  Past Medical History:  Past Medical History:   Diagnosis Date    Abnormal cardiovascular function study     Acute cholecystitis 12/17/2013    Allergic rhinitis 9/27/2012    Allergic rhinitis due to allergen 7/1/2015    Anemia 9/27/2012    Atrial fibrillation ([address]     blindness to left eye     Chronic anemia 7/1/2015    Chronic kidney disease, stage III (moderate) 9/27/2012    Crohn's disease ([address]     Diabetes ([address]     DM2 (diabetes mellitus, type 2) ([address] 9/27/2012    Essential hypertension 7/1/2015    Gastroesophageal reflux disease without esophagitis 7/1/2015    GERD (gastroesophageal reflux disease) 9/27/2012    Heart murmur     HLD (hyperlipidemia) 9/27/2012    HTN (hypertension)     Hypokalemia 9/27/2012    Hypothyroidism     Hypothyroidism 9/27/2012    Hypothyroidism due to medication 7/1/2015    Inflammatory bowel disease 7/1/2015    Palpitations     Personal history of asthma-as a child.  8/24/2016    Type 2 diabetes mellitus without complication ([address] 9/1/0185    Ulcerative colitis ([address]      Past Surgical History:   Procedure Laterality Date    HX CARPAL TUNNEL RELEASE      HX CATARACT REMOVAL      to L    HX HEENT      shunt to L eye r/t glaucoma    HX LAP CHOLECYSTECTOMY      HX ORTHOPAEDIC      to back    HX PARTIAL THYROIDECTOMY       Social History   Substance Use Topics    Smoking status: Never Smoker    Smokeless tobacco: Never Used    Alcohol use No     Family History   Problem Relation Age of Onset    Diabetes Mother     Hypertension Mother     Blindness Mother 80    Psychiatric Disorder Other      Anxiety    Heart Disease Neg Hx     Cancer Neg Hx      Previous Medications    ALBUTEROL (PROVENTIL HFA, VENTOLIN HFA, PROAIR HFA) 90 MCG/ACTUATION INHALER    Take 2 Puffs by inhalation every four (4) hours as needed for Wheezing. AMLODIPINE (NORVASC) 10 MG TABLET    Take 1 Tab by mouth daily. ATORVASTATIN (LIPITOR) 80 MG TABLET    Take 1 Tab by mouth daily. BD INSULIN PEN NEEDLE UF MINI 31 GAUGE X 3/16\" NDLE    USE ONE DAILY    BD SINGLE USE SWABS REGULAR PADM    USE AS DIRECTED    BUDESONIDE-FORMOTEROL (SYMBICORT) 160-4.5 MCG/ACTUATION HFA INHALER    Take 2 Puffs by inhalation two (2) times a day. CALCIUM-CHOLECALCIFEROL, D3, (CALCIUM 600 WITH VITAMIN D3) 600 MG(1,500MG) -400 UNIT CAP    Take  by mouth daily. CLONAZEPAM (KLONOPIN) 0.5 MG TABLET    0.25mg tab every morning and take 0.25 mg daily as needed    FLUTICASONE (FLONASE) 50 MCG/ACTUATION NASAL SPRAY    2 Sprays by Both Nostrils route daily. FREESTYLE LANCETS 28 GAUGE MISC    TEST BLOOD SUGAR QID BEFORE MEALS AND NIGHTLY    FREESTYLE LITE METER MONITORING KIT    TEST BLOOD SUGAR QID BEFORE MEALS AND NIGHTLY    GLUCOSE BLOOD VI TEST STRIPS (BLOOD GLUCOSE TEST) STRIP    by Does Not Apply route. GLUCOSE BLOOD VI TEST STRIPS (FREESTYLE TEST) STRIP    Test blood sugar QID Dx E11.69    HYOSCYAMINE (ANASPAZ, LEVSIN) 0.125 MG TABLET    Take 0.125 mg by mouth every six (6) hours as needed. INFLIXIMAB (REMICADE IV)    by IntraVENous route. INSULIN GLARGINE (LANTUS SOLOSTAR) 100 UNIT/ML (3 ML) PEN    10 Units by SubCUTAneous route nightly.     INSULIN SYRINGE-NEEDLE U-100 0.3 ML 31 GAUGE X 5/16 SYRG    USE AS DIRECTED WITH INSULIN    KETOTIFEN FUMARATE (EYE ITCH RELIEF OP)    Apply  to eye. LEVOTHYROXINE (SYNTHROID) 50 MCG TABLET    Take 1 Tab by mouth Daily (before breakfast). MAGNESIUM OXIDE (MAG-OX) 400 MG TABLET    Take 1 Tab by mouth daily. ONDANSETRON (ZOFRAN ODT) 4 MG DISINTEGRATING TABLET        ONE TOUCH DELICA 33 GAUGE MISC    TEST BLOOD SUGAR BEFORE EACH MEAL AND HS    POTASSIUM CHLORIDE (KLOR-CON M20) 20 MEQ TABLET    Take 1 Tab by mouth daily. UCERIS 9 MG TADE    Take 9 mg by mouth daily. VALSARTAN (DIOVAN) 320 MG TABLET    Take 1 Tab by mouth daily. Allergies as of 04/02/2018 - Review Complete 04/02/2018   Allergen Reaction Noted    Heparin (porcine) Other (comments) 12/17/2016    Lovenox [enoxaparin] Unknown (comments) 02/15/2017    Sulfa (sulfonamide antibiotics) Nausea and Vomiting 06/03/2011       Physical Exam:    Vital signs:   Visit Vitals    /70 (BP 1 Location: Left arm, BP Patient Position: At rest)    Pulse 73    Temp 98.3 °F (36.8 °C)    Resp 18    Ht 5' 1\" (1.549 m)    Wt 72.1 kg (159 lb)    SpO2 99%    BMI 30.04 kg/m2       Vital signs were reviewed. General Appear: no distress  Cardiovascular:        Radial pulses present  Musculoskeletal: ttenderness to palpation over the medial epicondyles. Mild swelling. No warmth. No deformity. Full range of motion at the hand wrist and elbow. No pain over the radial head. Skin:   No redness swelling abscess  Neurologic:  alert and oriented  _______________________________________________________________________  Radiology studies performed:    XR ELBOW LT MIN 3 V   Final Result   IMPRESSION: Small olecranon spur          ________________________________________________________________________  Progress:    78-year-old female seen with medical student    Atraumatic left medial elbow pain. I don't believe she has a DVT of the arm. No traumas reported. Her x-rays unremarkable. Suspect she has a medial epicondylitis.   We'll start anti-inflammatories and ice and rest.  Refer her to her primary care physician. His Fernanda Florentino also complains of intermittent dizziness. Been going on for the last several days. Feels lightheaded for a few seconds to minutes and resolves. No falls. No slurred speech. No confusion. No focal weakness. Nursing notes were reviewed: yes  Discussed patient with another provider: now  _______________________________________________________________________  Condition:  good  Disposition:  home  Diagnosis:  Left elbow pain-medial epicondylitis, dizziness    Zac Mays M.D.      Nehal [place]; version 2.0; revised April, 2016.

## 2018-04-02 NOTE — ED TRIAGE NOTES
Patient reports left arm pain x4 days. Hx blood clots. No longer on blood thinners. Patient reports some dizziness upon rising in the morning, but then subsides.

## 2018-04-02 NOTE — PROGRESS NOTES
Visit with very pleasant lady.    knows her from previous admissions  She is calm  Prayer    Dianne Key, staff [address], [address]  /   [email or web address]

## 2018-04-02 NOTE — ED NOTES
I have reviewed discharge instructions with the patient. The patient verbalized understanding. Patient left ED via Discharge Method: ambulatory to Home with spouse. Opportunity for questions and clarification provided. Patient given 0 scripts. To continue your aftercare when you leave the hospital, you may receive an automated call from our care team to check in on how you are doing. This is a free service and part of our promise to provide the best care and service to meet your aftercare needs.  If you have questions, or wish to unsubscribe from this service please call [phone]. Thank you for Choosing our [facility] Emergency Department.

## 2018-05-04 PROBLEM — R74.8 ELEVATED ALKALINE PHOSPHATASE LEVEL: Status: ACTIVE | Noted: 2018-05-04

## 2018-05-04 PROBLEM — N28.9 RENAL INSUFFICIENCY, MILD: Status: ACTIVE | Noted: 2018-05-04

## 2018-05-04 PROBLEM — R74.8 ELEVATED LIVER ENZYMES: Status: ACTIVE | Noted: 2018-05-04

## 2018-07-03 PROBLEM — N28.9 RENAL INSUFFICIENCY, MILD: Status: RESOLVED | Noted: 2018-05-04 | Resolved: 2018-07-03

## 2018-07-30 PROBLEM — E11.8 TYPE 2 DIABETES MELLITUS WITH COMPLICATION, WITH LONG-TERM CURRENT USE OF INSULIN (HCC): Status: RESOLVED | Noted: 2017-04-18 | Resolved: 2018-07-30

## 2018-07-30 PROBLEM — Z79.4 TYPE 2 DIABETES MELLITUS WITH COMPLICATION, WITH LONG-TERM CURRENT USE OF INSULIN (HCC): Status: RESOLVED | Noted: 2017-04-18 | Resolved: 2018-07-30

## 2018-08-22 ENCOUNTER — HOSPITAL ENCOUNTER (OUTPATIENT)
Dept: MAMMOGRAPHY | Age: 73
Discharge: HOME | End: 2018-08-22
Attending: FAMILY MEDICINE
Payer: MEDICARE

## 2018-08-22 PROCEDURE — 77067 SCR MAMMO BI INCL CAD: CPT

## 2019-03-01 ENCOUNTER — HOSPITAL ENCOUNTER (OUTPATIENT)
Dept: LAB | Age: 74
Discharge: HOME | End: 2019-03-01
Payer: MEDICARE

## 2019-03-01 LAB
ALBUMIN SERPL-MCNC: 3.2 G/DL (ref 3.2–4.6)
ALBUMIN/GLOB SERPL: 0.7 {RATIO} (ref 1.2–3.5)
ALP SERPL-CCNC: 114 U/L (ref 50–136)
ALT SERPL-CCNC: 24 U/L (ref 12–65)
ANION GAP SERPL CALC-SCNC: 8 MMOL/L (ref 7–16)
AST SERPL-CCNC: 24 U/L (ref 15–37)
BASOPHILS # BLD: 0 K/UL (ref 0–0.2)
BASOPHILS NFR BLD: 1 % (ref 0–2)
BILIRUB SERPL-MCNC: 0.3 MG/DL (ref 0.2–1.1)
BUN SERPL-MCNC: 16 MG/DL (ref 8–23)
CALCIUM SERPL-MCNC: 8.2 MG/DL (ref 8.3–10.4)
CHLORIDE SERPL-SCNC: 107 MMOL/L (ref 98–107)
CHOLEST SERPL-MCNC: 135 MG/DL
CO2 SERPL-SCNC: 27 MMOL/L (ref 21–32)
CREAT SERPL-MCNC: 1.02 MG/DL (ref 0.6–1)
DIFFERENTIAL METHOD BLD: ABNORMAL
EOSINOPHIL # BLD: 0.2 K/UL (ref 0–0.8)
EOSINOPHIL NFR BLD: 2 % (ref 0.5–7.8)
ERYTHROCYTE [DISTWIDTH] IN BLOOD BY AUTOMATED COUNT: 14.3 % (ref 11.9–14.6)
GLOBULIN SER CALC-MCNC: 4.8 G/DL (ref 2.3–3.5)
GLUCOSE SERPL-MCNC: 90 MG/DL (ref 65–100)
HCT VFR BLD AUTO: 38.1 % (ref 35.8–46.3)
HDLC SERPL-MCNC: 42 MG/DL (ref 40–60)
HDLC SERPL: 3.2 {RATIO}
HGB BLD-MCNC: 11.7 G/DL (ref 11.7–15.4)
IMM GRANULOCYTES # BLD AUTO: 0 K/UL (ref 0–0.5)
IMM GRANULOCYTES NFR BLD AUTO: 0 % (ref 0–5)
LDLC SERPL CALC-MCNC: 72.4 MG/DL
LIPID PROFILE,FLP: NORMAL
LYMPHOCYTES # BLD: 3.5 K/UL (ref 0.5–4.6)
LYMPHOCYTES NFR BLD: 49 % (ref 13–44)
MAGNESIUM SERPL-MCNC: 1.9 MG/DL (ref 1.8–2.4)
MCH RBC QN AUTO: 26.6 PG (ref 26.1–32.9)
MCHC RBC AUTO-ENTMCNC: 30.7 G/DL (ref 31.4–35)
MCV RBC AUTO: 86.6 FL (ref 79.6–97.8)
MONOCYTES # BLD: 0.6 K/UL (ref 0.1–1.3)
MONOCYTES NFR BLD: 8 % (ref 4–12)
NEUTS SEG # BLD: 2.9 K/UL (ref 1.7–8.2)
NEUTS SEG NFR BLD: 40 % (ref 43–78)
NRBC # BLD: 0 K/UL (ref 0–0.2)
PLATELET # BLD AUTO: 223 K/UL (ref 150–450)
PMV BLD AUTO: 11.8 FL (ref 9.4–12.3)
POTASSIUM SERPL-SCNC: 3.5 MMOL/L (ref 3.5–5.1)
PROT SERPL-MCNC: 8 G/DL (ref 6.3–8.2)
RBC # BLD AUTO: 4.4 M/UL (ref 4.05–5.2)
SODIUM SERPL-SCNC: 142 MMOL/L (ref 136–145)
T4 FREE SERPL-MCNC: 1.1 NG/DL (ref 0.9–1.8)
TRIGL SERPL-MCNC: 103 MG/DL (ref 35–150)
TSH SERPL DL<=0.005 MIU/L-ACNC: 1.47 UIU/ML (ref 0.36–3.74)
VLDLC SERPL CALC-MCNC: 20.6 MG/DL (ref 6–23)
WBC # BLD AUTO: 7.2 K/UL (ref 4.3–11.1)

## 2019-03-01 PROCEDURE — 83735 ASSAY OF MAGNESIUM: CPT

## 2019-03-01 PROCEDURE — 36415 COLL VENOUS BLD VENIPUNCTURE: CPT

## 2019-03-01 PROCEDURE — 80061 LIPID PANEL: CPT

## 2019-03-01 PROCEDURE — 85025 COMPLETE CBC W/AUTO DIFF WBC: CPT

## 2019-03-01 PROCEDURE — 80053 COMPREHEN METABOLIC PANEL: CPT

## 2019-03-01 PROCEDURE — 84439 ASSAY OF FREE THYROXINE: CPT

## 2019-03-01 PROCEDURE — 84443 ASSAY THYROID STIM HORMONE: CPT

## 2019-04-18 ENCOUNTER — HOSPITAL ENCOUNTER (OUTPATIENT)
Dept: LAB | Age: 74
Discharge: HOME | End: 2019-04-18
Payer: MEDICARE

## 2019-04-18 LAB
CALCIUM SERPL-MCNC: 8.7 MG/DL (ref 8.3–10.4)
CALCIUM SERPL-MCNC: 9 MG/DL (ref 8.3–10.4)
PTH-INTACT SERPL-MCNC: 251.5 PG/ML (ref 18.5–88)

## 2019-04-18 PROCEDURE — 84165 PROTEIN E-PHORESIS SERUM: CPT

## 2019-04-18 PROCEDURE — 83970 ASSAY OF PARATHORMONE: CPT

## 2019-04-18 PROCEDURE — 36415 COLL VENOUS BLD VENIPUNCTURE: CPT

## 2019-04-18 PROCEDURE — 82310 ASSAY OF CALCIUM: CPT

## 2019-04-19 LAB
ALBUMIN SERPL ELPH-MCNC: 3.65 G/DL (ref 3.2–5.6)
ALBUMIN/GLOB SERPL: 0.8 {RATIO}
ALPHA1 GLOB SERPL ELPH-MCNC: 0.26 G/DL (ref 0.1–0.4)
ALPHA2 GLOB SERPL ELPH-MCNC: 0.84 G/DL (ref 0.4–1.2)
B-GLOBULIN SERPL QL ELPH: 1.09 G/DL (ref 0.6–1.3)
GAMMA GLOB MFR SERPL ELPH: 2.17 G/DL (ref 0.5–1.6)
M PROTEIN SERPL ELPH-MCNC: ABNORMAL G/DL
PROT PATTERN SERPL ELPH-IMP: ABNORMAL
PROT SERPL-MCNC: 8 G/DL (ref 6.3–8.2)

## 2019-04-19 NOTE — PROGRESS NOTES
Her calcium is normal but she has a very high parathyroid hormone level. I would like to repeat this in 1 month.

## 2019-06-17 ENCOUNTER — HOSPITAL ENCOUNTER (OUTPATIENT)
Dept: LAB | Age: 74
Discharge: HOME | End: 2019-06-17
Payer: MEDICARE

## 2019-06-17 ENCOUNTER — HOSPITAL ENCOUNTER (OUTPATIENT)
Dept: GENERAL RADIOLOGY | Age: 74
Discharge: HOME | End: 2019-06-17
Payer: MEDICARE

## 2019-06-17 DIAGNOSIS — M25.562 CHRONIC PAIN OF LEFT KNEE: ICD-10-CM

## 2019-06-17 DIAGNOSIS — G89.29 CHRONIC PAIN OF LEFT KNEE: ICD-10-CM

## 2019-06-17 LAB
ALBUMIN SERPL-MCNC: 3.1 G/DL (ref 3.2–4.6)
ALBUMIN/GLOB SERPL: 0.7 {RATIO} (ref 1.2–3.5)
ALP SERPL-CCNC: 107 U/L (ref 50–136)
ALT SERPL-CCNC: 19 U/L (ref 12–65)
ANION GAP SERPL CALC-SCNC: 7 MMOL/L (ref 7–16)
AST SERPL-CCNC: 18 U/L (ref 15–37)
BASOPHILS # BLD: 0.1 K/UL (ref 0–0.2)
BASOPHILS NFR BLD: 1 % (ref 0–2)
BILIRUB SERPL-MCNC: 0.3 MG/DL (ref 0.2–1.1)
BUN SERPL-MCNC: 18 MG/DL (ref 8–23)
CALCIUM SERPL-MCNC: 8.4 MG/DL (ref 8.3–10.4)
CHLORIDE SERPL-SCNC: 109 MMOL/L (ref 98–107)
CO2 SERPL-SCNC: 27 MMOL/L (ref 21–32)
CREAT SERPL-MCNC: 1.12 MG/DL (ref 0.6–1)
DIFFERENTIAL METHOD BLD: ABNORMAL
EOSINOPHIL # BLD: 0.3 K/UL (ref 0–0.8)
EOSINOPHIL NFR BLD: 3 % (ref 0.5–7.8)
ERYTHROCYTE [DISTWIDTH] IN BLOOD BY AUTOMATED COUNT: 14.1 % (ref 11.9–14.6)
GLOBULIN SER CALC-MCNC: 4.5 G/DL (ref 2.3–3.5)
GLUCOSE SERPL-MCNC: 134 MG/DL (ref 65–100)
HCT VFR BLD AUTO: 36 % (ref 35.8–46.3)
HGB BLD-MCNC: 11.2 G/DL (ref 11.7–15.4)
IMM GRANULOCYTES # BLD AUTO: 0 K/UL (ref 0–0.5)
IMM GRANULOCYTES NFR BLD AUTO: 0 % (ref 0–5)
LYMPHOCYTES # BLD: 3.9 K/UL (ref 0.5–4.6)
LYMPHOCYTES NFR BLD: 46 % (ref 13–44)
MCH RBC QN AUTO: 26.9 PG (ref 26.1–32.9)
MCHC RBC AUTO-ENTMCNC: 31.1 G/DL (ref 31.4–35)
MCV RBC AUTO: 86.3 FL (ref 79.6–97.8)
MONOCYTES # BLD: 0.7 K/UL (ref 0.1–1.3)
MONOCYTES NFR BLD: 8 % (ref 4–12)
NEUTS SEG # BLD: 3.7 K/UL (ref 1.7–8.2)
NEUTS SEG NFR BLD: 43 % (ref 43–78)
NRBC # BLD: 0 K/UL (ref 0–0.2)
PLATELET # BLD AUTO: 222 K/UL (ref 150–450)
PMV BLD AUTO: 11.8 FL (ref 9.4–12.3)
POTASSIUM SERPL-SCNC: 3.5 MMOL/L (ref 3.5–5.1)
PROT SERPL-MCNC: 7.6 G/DL (ref 6.3–8.2)
RBC # BLD AUTO: 4.17 M/UL (ref 4.05–5.2)
SODIUM SERPL-SCNC: 143 MMOL/L (ref 136–145)
WBC # BLD AUTO: 8.5 K/UL (ref 4.3–11.1)

## 2019-06-17 PROCEDURE — 73560 X-RAY EXAM OF KNEE 1 OR 2: CPT

## 2019-06-17 PROCEDURE — 80053 COMPREHEN METABOLIC PANEL: CPT

## 2019-06-17 PROCEDURE — 85025 COMPLETE CBC W/AUTO DIFF WBC: CPT

## 2019-06-17 PROCEDURE — 36415 COLL VENOUS BLD VENIPUNCTURE: CPT

## 2019-06-18 NOTE — PROGRESS NOTES
Her x-ray reveals significant osteoarthritis
Reviewed results and patient verbalizes understanding.
no

## 2019-07-06 ENCOUNTER — APPOINTMENT (OUTPATIENT)
Dept: CT IMAGING | Age: 74
End: 2019-07-06
Attending: EMERGENCY MEDICINE
Payer: MEDICARE

## 2019-07-06 ENCOUNTER — APPOINTMENT (OUTPATIENT)
Dept: ULTRASOUND IMAGING | Age: 74
End: 2019-07-06
Attending: EMERGENCY MEDICINE
Payer: MEDICARE

## 2019-07-06 ENCOUNTER — HOSPITAL ENCOUNTER (EMERGENCY)
Age: 74
Discharge: HOME | End: 2019-07-06
Attending: EMERGENCY MEDICINE | Admitting: EMERGENCY MEDICINE
Payer: MEDICARE

## 2019-07-06 VITALS
HEIGHT: 61 IN | OXYGEN SATURATION: 95 % | RESPIRATION RATE: 18 BRPM | DIASTOLIC BLOOD PRESSURE: 72 MMHG | BODY MASS INDEX: 31.15 KG/M2 | HEART RATE: 80 BPM | TEMPERATURE: 98.5 F | WEIGHT: 165 LBS | SYSTOLIC BLOOD PRESSURE: 164 MMHG

## 2019-07-06 DIAGNOSIS — M54.6 ACUTE BILATERAL THORACIC BACK PAIN: Primary | ICD-10-CM

## 2019-07-06 LAB
ALBUMIN SERPL-MCNC: 3.4 G/DL (ref 3.2–4.6)
ALBUMIN/GLOB SERPL: 0.7 {RATIO} (ref 1.2–3.5)
ALP SERPL-CCNC: 120 U/L (ref 50–136)
ALT SERPL-CCNC: 17 U/L (ref 12–65)
ANION GAP SERPL CALC-SCNC: 9 MMOL/L (ref 7–16)
AST SERPL-CCNC: 19 U/L (ref 15–37)
BASOPHILS # BLD: 0.1 K/UL (ref 0–0.2)
BASOPHILS NFR BLD: 1 % (ref 0–2)
BILIRUB SERPL-MCNC: 0.6 MG/DL (ref 0.2–1.1)
BUN SERPL-MCNC: 13 MG/DL (ref 8–23)
CALCIUM SERPL-MCNC: 8.7 MG/DL (ref 8.3–10.4)
CHLORIDE SERPL-SCNC: 102 MMOL/L (ref 98–107)
CO2 SERPL-SCNC: 29 MMOL/L (ref 21–32)
CREAT SERPL-MCNC: 1.08 MG/DL (ref 0.6–1)
DIFFERENTIAL METHOD BLD: ABNORMAL
EOSINOPHIL # BLD: 0.1 K/UL (ref 0–0.8)
EOSINOPHIL NFR BLD: 1 % (ref 0.5–7.8)
ERYTHROCYTE [DISTWIDTH] IN BLOOD BY AUTOMATED COUNT: 14 % (ref 11.9–14.6)
GLOBULIN SER CALC-MCNC: 4.9 G/DL (ref 2.3–3.5)
GLUCOSE SERPL-MCNC: 190 MG/DL (ref 65–100)
HCT VFR BLD AUTO: 37.9 % (ref 35.8–46.3)
HGB BLD-MCNC: 12.2 G/DL (ref 11.7–15.4)
IMM GRANULOCYTES # BLD AUTO: 0 K/UL (ref 0–0.5)
IMM GRANULOCYTES NFR BLD AUTO: 0 % (ref 0–5)
LIPASE SERPL-CCNC: 78 U/L (ref 73–393)
LYMPHOCYTES # BLD: 2.7 K/UL (ref 0.5–4.6)
LYMPHOCYTES NFR BLD: 27 % (ref 13–44)
MCH RBC QN AUTO: 27.2 PG (ref 26.1–32.9)
MCHC RBC AUTO-ENTMCNC: 32.2 G/DL (ref 31.4–35)
MCV RBC AUTO: 84.6 FL (ref 79.6–97.8)
MONOCYTES # BLD: 0.9 K/UL (ref 0.1–1.3)
MONOCYTES NFR BLD: 9 % (ref 4–12)
NEUTS SEG # BLD: 6.1 K/UL (ref 1.7–8.2)
NEUTS SEG NFR BLD: 62 % (ref 43–78)
NRBC # BLD: 0 K/UL (ref 0–0.2)
PLATELET # BLD AUTO: 163 K/UL (ref 150–450)
PMV BLD AUTO: 12.8 FL (ref 9.4–12.3)
POTASSIUM SERPL-SCNC: 3.6 MMOL/L (ref 3.5–5.1)
PROT SERPL-MCNC: 8.3 G/DL (ref 6.3–8.2)
RBC # BLD AUTO: 4.48 M/UL (ref 4.05–5.2)
SODIUM SERPL-SCNC: 140 MMOL/L (ref 136–145)
WBC # BLD AUTO: 9.8 K/UL (ref 4.3–11.1)

## 2019-07-06 PROCEDURE — 96374 THER/PROPH/DIAG INJ IV PUSH: CPT | Performed by: EMERGENCY MEDICINE

## 2019-07-06 PROCEDURE — 80053 COMPREHEN METABOLIC PANEL: CPT

## 2019-07-06 PROCEDURE — 83690 ASSAY OF LIPASE: CPT

## 2019-07-06 PROCEDURE — 74177 CT ABD & PELVIS W/CONTRAST: CPT

## 2019-07-06 PROCEDURE — 96375 TX/PRO/DX INJ NEW DRUG ADDON: CPT | Performed by: EMERGENCY MEDICINE

## 2019-07-06 PROCEDURE — 85025 COMPLETE CBC W/AUTO DIFF WBC: CPT

## 2019-07-06 PROCEDURE — 74011000258 HC RX REV CODE- 258: Performed by: EMERGENCY MEDICINE

## 2019-07-06 PROCEDURE — 96361 HYDRATE IV INFUSION ADD-ON: CPT | Performed by: EMERGENCY MEDICINE

## 2019-07-06 PROCEDURE — 99283 EMERGENCY DEPT VISIT LOW MDM: CPT | Performed by: EMERGENCY MEDICINE

## 2019-07-06 PROCEDURE — 74011250636 HC RX REV CODE- 250/636: Performed by: EMERGENCY MEDICINE

## 2019-07-06 PROCEDURE — 74011636320 HC RX REV CODE- 636/320: Performed by: EMERGENCY MEDICINE

## 2019-07-06 PROCEDURE — 93971 EXTREMITY STUDY: CPT

## 2019-07-06 RX ORDER — DICLOFENAC POTASSIUM 50 MG/1
50 TABLET, FILM COATED ORAL 3 TIMES DAILY
Qty: 15 TAB | Refills: 0 | Status: SHIPPED | OUTPATIENT
Start: 2019-07-06 | End: 2020-09-23 | Stop reason: ALTCHOICE

## 2019-07-06 RX ORDER — MORPHINE SULFATE 4 MG/ML
4 INJECTION INTRAVENOUS
Status: COMPLETED | OUTPATIENT
Start: 2019-07-06 | End: 2019-07-06

## 2019-07-06 RX ORDER — CYCLOBENZAPRINE HCL 5 MG
10 TABLET ORAL
Qty: 20 TAB | Refills: 0 | Status: SHIPPED | OUTPATIENT
Start: 2019-07-06 | End: 2020-09-23 | Stop reason: ALTCHOICE

## 2019-07-06 RX ORDER — TIMOLOL MALEATE 5 MG/ML
1 SOLUTION/ DROPS OPHTHALMIC
COMMUNITY
Start: 2019-06-20 | End: 2021-04-14 | Stop reason: ALTCHOICE

## 2019-07-06 RX ORDER — ONDANSETRON 2 MG/ML
4 INJECTION INTRAMUSCULAR; INTRAVENOUS
Status: COMPLETED | OUTPATIENT
Start: 2019-07-06 | End: 2019-07-06

## 2019-07-06 RX ORDER — SODIUM CHLORIDE 0.9 % (FLUSH) 0.9 %
10 SYRINGE (ML) INJECTION
Status: COMPLETED | OUTPATIENT
Start: 2019-07-06 | End: 2019-07-06

## 2019-07-06 RX ADMIN — IOPAMIDOL 100 ML: 755 INJECTION, SOLUTION INTRAVENOUS at 11:55

## 2019-07-06 RX ADMIN — ONDANSETRON 4 MG: 2 INJECTION INTRAMUSCULAR; INTRAVENOUS at 10:10

## 2019-07-06 RX ADMIN — SODIUM CHLORIDE 1000 ML: 900 INJECTION, SOLUTION INTRAVENOUS at 10:09

## 2019-07-06 RX ADMIN — MORPHINE SULFATE 4 MG: 4 INJECTION INTRAVENOUS at 10:09

## 2019-07-06 RX ADMIN — Medication 10 ML: at 11:55

## 2019-07-06 RX ADMIN — SODIUM CHLORIDE 100 ML: 900 INJECTION, SOLUTION INTRAVENOUS at 11:55

## 2019-07-06 NOTE — DISCHARGE INSTRUCTIONS
Patient Education        Learning About Relief for Back Pain  What is back tension and strain? Back strain happens when you overstretch, or pull, a muscle in your back. You may hurt your back in an accident or when you exercise or lift something. Most back pain will get better with rest and time. You can take care of yourself at home to help your back heal.  What can you do first to relieve back pain? When you first feel back pain, try these steps:  · Walk. Take a short walk (10 to 20 minutes) on a level surface (no slopes, hills, or stairs) every 2 to 3 hours. Walk only distances you can manage without pain, especially leg pain. · Relax. Find a comfortable position for rest. Some people are comfortable on the floor or a medium-firm bed with a small pillow under their head and another under their knees. Some people prefer to lie on their side with a pillow between their knees. Don't stay in one position for too long. · Try heat or ice. Try using a heating pad on a low or medium setting, or take a warm shower, for 15 to 20 minutes every 2 to 3 hours. Or you can buy single-use heat wraps that last up to 8 hours. You can also try an ice pack for 10 to 15 minutes every 2 to 3 hours. You can use an ice pack or a bag of frozen vegetables wrapped in a thin towel. There is not strong evidence that either heat or ice will help, but you can try them to see if they help. You may also want to try switching between heat and cold. · Take pain medicine exactly as directed. ¨ If the doctor gave you a prescription medicine for pain, take it as prescribed. ¨ If you are not taking a prescription pain medicine, ask your doctor if you can take an over-the-counter medicine. What else can you do? · Stretch and exercise. Exercises that increase flexibility may relieve your pain and make it easier for your muscles to keep your spine in a good, neutral position. And don't forget to keep walking. · Do self-massage.  You can use self-massage to unwind after work or school or to energize yourself in the morning. You can easily massage your feet, hands, or neck. Self-massage works best if you are in comfortable clothes and are sitting or lying in a comfortable position. Use oil or lotion to massage bare skin. · Reduce stress. Back pain can lead to a vicious [place]: Distress about the pain tenses the muscles in your back, which in turn causes more pain. Learn how to relax your mind and your muscles to lower your stress. Where can you learn more? Go to http://melidaContixpriya.info/. Enter C123 in the search box to learn more about \"Learning About Relief for Back Pain. \"  Current as of: March 21, 2017  Content Version: 11.5  © 8541-4728 [employer]. Care instructions adapted under license by [employer] (which disclaims liability or warranty for this information). If you have questions about a medical condition or this instruction, always ask your healthcare professional. [place] [address] any warranty or liability for your use of this information. Patient Education        Back Pain: Care Instructions  Your Care Instructions    Back pain has many possible causes. It is often related to problems with muscles and ligaments of the back. It may also be related to problems with the nerves, discs, or bones of the back. Moving, lifting, standing, sitting, or sleeping in an awkward way can strain the back. Sometimes you don't notice the injury until later. Arthritis is another common cause of back pain. Although it may hurt a lot, back pain usually improves on its own within several weeks. Most people recover in 12 weeks or less. Using good home treatment and being careful not to stress your back can help you feel better sooner. Follow-up care is a key part of your treatment and safety. Be sure to make and go to all appointments, and call your doctor if you are having problems.  It's also a good idea to know your test results and keep a list of the medicines you take. How can you care for yourself at home? · Sit or lie in positions that are most comfortable and reduce your pain. Try one of these positions when you lie down:  ? Lie on your back with your knees bent and supported by large pillows. ? Lie on the floor with your legs on the seat of a sofa or chair. ? Lie on your side with your knees and hips bent and a pillow between your legs. ? Lie on your stomach if it does not make pain worse. · Do not sit up in bed, and avoid soft couches and twisted positions. Bed rest can help relieve pain at first, but it delays healing. Avoid bed rest after the first day of back pain. · Change positions every 30 minutes. If you must sit for long periods of time, take breaks from sitting. Get up and walk around, or lie in a comfortable position. · Try using a heating pad on a low or medium setting for 15 to 20 minutes every 2 or 3 hours. Try a warm shower in place of one session with the heating pad. · You can also try an ice pack for 10 to 15 minutes every 2 to 3 hours. Put a thin cloth between the ice pack and your skin. · Take pain medicines exactly as directed. ? If the doctor gave you a prescription medicine for pain, take it as prescribed. ? If you are not taking a prescription pain medicine, ask your doctor if you can take an over-the-counter medicine. · Take short walks several times a day. You can start with 5 to 10 minutes, 3 or 4 times a day, and work up to longer walks. Walk on level surfaces and avoid hills and stairs until your back is better. · Return to work and other activities as soon as you can. Continued rest without activity is usually not good for your back. · To prevent future back pain, do exercises to stretch and strengthen your back and stomach. Learn how to use good posture, safe lifting techniques, and proper body mechanics. When should you call for help?   Call your doctor now or seek immediate medical care if:    · You have new or worsening numbness in your legs.     · You have new or worsening weakness in your legs. (This could make it hard to stand up.)     · You lose control of your bladder or bowels.    Watch closely for changes in your health, and be sure to contact your doctor if:    · You have a fever, lose weight, or don't feel well.     · You do not get better as expected. Where can you learn more? Go to http://melida-priya.info/. Enter Y604 in the search box to learn more about \"Back Pain: Care Instructions. \"  Current as of: September 20, 2018  Content Version: 11.9  © 3139-3586 [employer]. Care instructions adapted under license by [employer] (which disclaims liability or warranty for this information). If you have questions about a medical condition or this instruction, always ask your healthcare professional. [address] any warranty or liability for your use of this information.

## 2019-07-06 NOTE — ED NOTES
I have reviewed discharge instructions with the patient. The patient verbalized understanding. Patient left ED via Discharge Method: ambulatory to Home with self    Opportunity for questions and clarification provided. Patient given 2 scripts. To continue your aftercare when you leave the hospital, you may receive an automated call from our care team to check in on how you are doing. This is a free service and part of our promise to provide the best care and service to meet your aftercare needs.  If you have questions, or wish to unsubscribe from this service please call [phone]. Thank you for Choosing our [facility] Emergency Department.

## 2019-07-06 NOTE — ED TRIAGE NOTES
Patient reports pain going across her upper back. States that she has ulcerative colitis and thinks that this may be from that. States that she is also having pain in her left leg. States that he has a history of blood clots and would like to get check out. Denies chest pain, SOB, numbness or tingling. Denies bloody stools.

## 2019-07-06 NOTE — ED PROVIDER NOTES
Patient with history of ulcerative colitis. Having some upper abdominal pain going to her bilateral mid back. Pain in her back is worse with movement. No nausea or vomiting. No diarrhea. Recent constipation which she took MiraLAX. No dysuria or hematuria. Also, left lower extremity pain with previous history of blood clot. No chest pain or shortness of breath. The history is provided by the patient. No  was used. Back Pain    This is a new problem. The current episode started more than 2 days ago. The problem has been gradually worsening. The problem occurs constantly. Patient reports not work related injury. The pain is associated with no known injury. The pain is present in the thoracic spine, left side and right side. The quality of the pain is described as aching and sharp. The pain does not radiate. The pain is moderate. The symptoms are aggravated by bending and twisting. Associated symptoms include abdominal pain and leg pain (LLE). Pertinent negatives include no chest pain, no fever, no numbness, no headaches, no abdominal swelling, no bowel incontinence, no perianal numbness, no bladder incontinence, no dysuria, no pelvic pain, no paresthesias and no weakness. She has tried nothing for the symptoms.         Past Medical History:   Diagnosis Date    Abnormal cardiovascular function study     Acute cholecystitis 12/17/2013    Allergic rhinitis 9/27/2012    Allergic rhinitis due to allergen 7/1/2015    Anemia 9/27/2012    Atrial fibrillation (HCC)     blindness to left eye     Chronic anemia 7/1/2015    Chronic kidney disease, stage III (moderate) [facility] [address] 9/27/2012    Crohn's disease ([facility] [address]     Diabetes ([facility] [address]     DM2 (diabetes mellitus, type 2) ([facility] [address] 9/27/2012    Essential hypertension 7/1/2015    Gastroesophageal reflux disease without esophagitis 7/1/2015    GERD (gastroesophageal reflux disease) 9/27/2012    Heart murmur     HLD (hyperlipidemia) 9/27/2012    HTN (hypertension)     Hypokalemia 9/27/2012    Hypothyroidism     Hypothyroidism 9/27/2012    Hypothyroidism due to medication 7/1/2015    Inflammatory bowel disease 7/1/2015    Palpitations     Personal history of asthma-as a child.  8/24/2016    Type 2 diabetes mellitus without complication ([facility] [address] 9/9/3159    Ulcerative colitis (HCC)        Past Surgical History:   Procedure Laterality Date    HX CARPAL TUNNEL RELEASE      HX CATARACT REMOVAL      to L    HX HEENT      shunt to L eye r/t glaucoma    HX LAP CHOLECYSTECTOMY      HX ORTHOPAEDIC      to back    HX PARTIAL THYROIDECTOMY           Family History:   Problem Relation Age of Onset    Diabetes Mother     Hypertension Mother     Blindness Mother 80    Psychiatric Disorder Other         Anxiety    Heart Disease Neg Hx     Cancer Neg Hx        Social History     Socioeconomic History    Marital status:      Spouse name: Not on file    Number of children: Not on file    Years of education: Not on file    Highest education level: Not on file   Occupational History    Not on file   Social Needs    Financial resource strain: Not on file    Food insecurity:     Worry: Not on file     Inability: Not on file    Transportation needs:     Medical: Not on file     Non-medical: Not on file   Tobacco Use    Smoking status: Never Smoker    Smokeless tobacco: Never Used   Substance and Sexual Activity    Alcohol use: No    Drug use: No    Sexual activity: Not on file   Lifestyle    Physical activity:     Days per week: Not on file     Minutes per session: Not on file    Stress: Not on file   Relationships    Social connections:     Talks on phone: Not on file     Gets together: Not on file     Attends [employer] service: Not on file     Active member of club or organization: Not on file     Attends meetings of clubs or organizations: Not on file     Relationship status: Not on file    Intimate partner violence:     Fear of current or ex partner: Not on file     Emotionally abused: Not on file     Physically abused: Not on file     Forced sexual activity: Not on file   Other Topics Concern    Not on file   Social History Narrative    Not on file         ALLERGIES: Heparin (porcine); Lovenox [enoxaparin]; and Sulfa (sulfonamide antibiotics)    Review of Systems   Constitutional: Negative for chills and fever. HENT: Negative for rhinorrhea and sore throat. Eyes: Negative for pain and redness. Respiratory: Negative for chest tightness, shortness of breath and wheezing. Cardiovascular: Negative for chest pain and leg swelling. Gastrointestinal: Positive for abdominal pain. Negative for bowel incontinence, diarrhea, nausea and vomiting. Genitourinary: Negative for bladder incontinence, dysuria, hematuria and pelvic pain. Musculoskeletal: Positive for back pain. Negative for gait problem, neck pain and neck stiffness. Skin: Negative for color change and rash. Neurological: Negative for weakness, numbness, headaches and paresthesias. Vitals:    07/06/19 0935   BP: 141/86   Pulse: 83   Resp: 18   Temp: 98.5 °F (36.9 °C)   SpO2: 98%   Weight: 74.8 kg (165 lb)   Height: 5' 1\" (1.549 m)            Physical Exam   Constitutional: She is oriented to person, place, and time. She appears well-developed and well-nourished. HENT:   Head: Normocephalic and atraumatic. Neck: Normal range of motion. Neck supple. Cardiovascular: Normal rate and regular rhythm. Pulmonary/Chest: Effort normal and breath sounds normal.   Abdominal: Soft. Bowel sounds are normal. There is tenderness (epigastric). Musculoskeletal: Normal range of motion. She exhibits tenderness (LLE in calf and thigh. ). She exhibits no edema. Neurological: She is alert and oriented to person, place, and time. Skin: Skin is warm and dry. MDM  Number of Diagnoses or Management Options  Diagnosis management comments: Back pain. We'll treat at home. Amount and/or Complexity of Data Reviewed  Clinical lab tests: ordered and reviewed  Tests in the radiology section of CPT®: ordered and reviewed  Tests in the medicine section of CPT®: ordered and reviewed    Patient Progress  Patient progress: stable         Procedures               Results Include:    Recent Results (from the past 24 hour(s))   CBC WITH AUTOMATED DIFF    Collection Time: 07/06/19 10:06 AM   Result Value Ref Range    WBC 9.8 4.3 - 11.1 K/uL    RBC 4.48 4.05 - 5.2 M/uL    HGB 12.2 11.7 - 15.4 g/dL    HCT 37.9 35.8 - 46.3 %    MCV 84.6 79.6 - 97.8 FL    MCH 27.2 26.1 - 32.9 PG    MCHC 32.2 31.4 - 35.0 g/dL    RDW 14.0 11.9 - 14.6 %    PLATELET 045 321 - 072 K/uL    MPV 12.8 (H) 9.4 - 12.3 FL    ABSOLUTE NRBC 0.00 0.0 - 0.2 K/uL    DF AUTOMATED      NEUTROPHILS 62 43 - 78 %    LYMPHOCYTES 27 13 - 44 %    MONOCYTES 9 4.0 - 12.0 %    EOSINOPHILS 1 0.5 - 7.8 %    BASOPHILS 1 0.0 - 2.0 %    IMMATURE GRANULOCYTES 0 0.0 - 5.0 %    ABS. NEUTROPHILS 6.1 1.7 - 8.2 K/UL    ABS. LYMPHOCYTES 2.7 0.5 - 4.6 K/UL    ABS. MONOCYTES 0.9 0.1 - 1.3 K/UL    ABS. EOSINOPHILS 0.1 0.0 - 0.8 K/UL    ABS. BASOPHILS 0.1 0.0 - 0.2 K/UL    ABS. IMM. GRANS. 0.0 0.0 - 0.5 K/UL   METABOLIC PANEL, COMPREHENSIVE    Collection Time: 07/06/19 10:06 AM   Result Value Ref Range    Sodium 140 136 - 145 mmol/L    Potassium 3.6 3.5 - 5.1 mmol/L    Chloride 102 98 - 107 mmol/L    CO2 29 21 - 32 mmol/L    Anion gap 9 7 - 16 mmol/L    Glucose 190 (H) 65 - 100 mg/dL    BUN 13 8 - 23 MG/DL    Creatinine 1.08 (H) 0.6 - 1.0 MG/DL    GFR est AA >60 >60 ml/min/1.73m2    GFR est non-AA 53 (L) >60 ml/min/1.73m2    Calcium 8.7 8.3 - 10.4 MG/DL    Bilirubin, total 0.6 0.2 - 1.1 MG/DL    ALT (SGPT) 17 12 - 65 U/L    AST (SGOT) 19 15 - 37 U/L    Alk.  phosphatase 120 50 - 136 U/L    Protein, total 8.3 (H) 6.3 - 8.2 g/dL    Albumin 3.4 3.2 - 4.6 g/dL    Globulin 4.9 (H) 2.3 - 3.5 g/dL    A-G Ratio 0.7 (L) 1.2 - 3.5     LIPASE    Collection Time: 07/06/19 10:06 AM   Result Value Ref Range    Lipase 78 73 - 393 U/L     CT ABD PELV W CONT (Final result)   Result time 07/06/19 12:18:37   Final result by Shawn Rangel MD (07/06/19 12:18:37)                Impression:    IMPRESSION: No acute findings in abdomen or pelvis            Narrative:    CT of the Abdomen and Pelvis    INDICATION: Upper back and left leg pain.  History of ulcerative colitis    Multiple axial images were obtained through the abdomen and pelvis.  Oral  contrast was used for bowel opacification.  100mL of Isovue 370 intravenous  contrast was used for better evaluation of solid organs and vascular structures.   Radiation dose reduction techniques were used for this study.  All CT scans  performed at this facility use one or all of the following: Automated exposure  control, adjustment of the mA and/or kVp according to patient's size, iterative  reconstruction. COMPARISON: 12/08/2016. FINDINGS:  -LUNG BASES: No infiltrates or masses. -LIVER: Normal in size and appearance.    -GALLBLADDER/BILE DUCTS: Post cholecystectomy.  No significant bile duct  dilatation. -PANCREAS: Normal.  -SPLEEN: Normal.    -ADRENALS: Normal.  -KIDNEYS/URETERS: No hydronephrosis or significant mass. -BLADDER: Normal.  -REPRODUCTIVE ORGANS: No pelvic masses. -BOWEL: Normal caliber.  No inflammatory changes. -LYMPH NODES: No significant retroperitoneal, mesenteric, or pelvic adenopathy. -BONES: Surgical changes in the lower lumbar spine.  No fracture or significant  bone lesion. -OTHER: Diffuse moderate atherosclerosis.                     DUPLEX LOWER EXT VENOUS LEFT (Final result)   Result time 07/06/19 11:48:35   Final result by Shawn Rangel MD (07/06/19 11:48:35)                Impression:    IMPRESSION: No evidence of deep venous thrombosis in the left lower extremity            Narrative:    Left lower extremity venous ultrasound    INDICATION:  Pain and swelling,    Doppler ultrasound of the left lower extremity was performed. FINDINGS: Ifeoma Leventhal is normal flow in the greater saphenous, common femoral,  superficial femoral, and popliteal veins. Normal compression and augmentation is  demonstrated.  The proximal calf veins are also patent.

## 2019-07-06 NOTE — ED NOTES
Pt taken to CT/US via stretcher. Report from Derek Craig, [facility] for continuation of care.

## 2019-08-23 ENCOUNTER — HOSPITAL ENCOUNTER (OUTPATIENT)
Dept: MAMMOGRAPHY | Age: 74
Discharge: HOME | End: 2019-08-23
Attending: FAMILY MEDICINE

## 2019-08-23 DIAGNOSIS — Z12.39 BREAST CANCER SCREENING: ICD-10-CM

## 2019-10-24 ENCOUNTER — HOSPITAL ENCOUNTER (OUTPATIENT)
Dept: LAB | Age: 74
Discharge: HOME | End: 2019-10-24
Payer: MEDICARE

## 2019-10-24 LAB
ALBUMIN SERPL-MCNC: 3.1 G/DL (ref 3.2–4.6)
ALBUMIN/GLOB SERPL: 0.6 {RATIO} (ref 1.2–3.5)
ALP SERPL-CCNC: 126 U/L (ref 50–136)
ALT SERPL-CCNC: 20 U/L (ref 12–65)
ANION GAP SERPL CALC-SCNC: 6 MMOL/L (ref 7–16)
AST SERPL-CCNC: 19 U/L (ref 15–37)
BASOPHILS # BLD: 0 K/UL (ref 0–0.2)
BASOPHILS NFR BLD: 0 % (ref 0–2)
BILIRUB SERPL-MCNC: 0.3 MG/DL (ref 0.2–1.1)
BUN SERPL-MCNC: 16 MG/DL (ref 8–23)
CALCIUM SERPL-MCNC: 8.5 MG/DL (ref 8.3–10.4)
CHLORIDE SERPL-SCNC: 110 MMOL/L (ref 98–107)
CHOLEST SERPL-MCNC: 128 MG/DL
CO2 SERPL-SCNC: 26 MMOL/L (ref 21–32)
CREAT SERPL-MCNC: 0.91 MG/DL (ref 0.6–1)
DIFFERENTIAL METHOD BLD: ABNORMAL
EOSINOPHIL # BLD: 0.2 K/UL (ref 0–0.8)
EOSINOPHIL NFR BLD: 3 % (ref 0.5–7.8)
ERYTHROCYTE [DISTWIDTH] IN BLOOD BY AUTOMATED COUNT: 14.1 % (ref 11.9–14.6)
EST. AVERAGE GLUCOSE BLD GHB EST-MCNC: 146 MG/DL
GLOBULIN SER CALC-MCNC: 4.9 G/DL (ref 2.3–3.5)
GLUCOSE SERPL-MCNC: 83 MG/DL (ref 65–100)
HBA1C MFR BLD: 6.7 % (ref 4.8–6)
HCT VFR BLD AUTO: 38.3 % (ref 35.8–46.3)
HDLC SERPL-MCNC: 45 MG/DL (ref 40–60)
HDLC SERPL: 2.8 {RATIO}
HGB BLD-MCNC: 11.9 G/DL (ref 11.7–15.4)
IMM GRANULOCYTES # BLD AUTO: 0 K/UL (ref 0–0.5)
IMM GRANULOCYTES NFR BLD AUTO: 0 % (ref 0–5)
LDLC SERPL CALC-MCNC: 70.6 MG/DL
LIPID PROFILE,FLP: NORMAL
LYMPHOCYTES # BLD: 3.2 K/UL (ref 0.5–4.6)
LYMPHOCYTES NFR BLD: 49 % (ref 13–44)
MAGNESIUM SERPL-MCNC: 2.1 MG/DL (ref 1.8–2.4)
MCH RBC QN AUTO: 26.4 PG (ref 26.1–32.9)
MCHC RBC AUTO-ENTMCNC: 31.1 G/DL (ref 31.4–35)
MCV RBC AUTO: 85.1 FL (ref 79.6–97.8)
MONOCYTES # BLD: 0.6 K/UL (ref 0.1–1.3)
MONOCYTES NFR BLD: 9 % (ref 4–12)
NEUTS SEG # BLD: 2.5 K/UL (ref 1.7–8.2)
NEUTS SEG NFR BLD: 39 % (ref 43–78)
NRBC # BLD: 0 K/UL (ref 0–0.2)
PLATELET # BLD AUTO: 225 K/UL (ref 150–450)
PMV BLD AUTO: 11.7 FL (ref 9.4–12.3)
POTASSIUM SERPL-SCNC: 3.8 MMOL/L (ref 3.5–5.1)
PROT SERPL-MCNC: 8 G/DL (ref 6.3–8.2)
RBC # BLD AUTO: 4.5 M/UL (ref 4.05–5.2)
SODIUM SERPL-SCNC: 142 MMOL/L (ref 136–145)
T4 FREE SERPL-MCNC: 1.1 NG/DL (ref 0.78–1.46)
TRIGL SERPL-MCNC: 62 MG/DL (ref 35–150)
TSH SERPL DL<=0.005 MIU/L-ACNC: 1.2 UIU/ML (ref 0.36–3.74)
VLDLC SERPL CALC-MCNC: 12.4 MG/DL (ref 6–23)
WBC # BLD AUTO: 6.5 K/UL (ref 4.3–11.1)

## 2019-10-24 PROCEDURE — 83036 HEMOGLOBIN GLYCOSYLATED A1C: CPT

## 2019-10-24 PROCEDURE — 84439 ASSAY OF FREE THYROXINE: CPT

## 2019-10-24 PROCEDURE — 85025 COMPLETE CBC W/AUTO DIFF WBC: CPT

## 2019-10-24 PROCEDURE — 84443 ASSAY THYROID STIM HORMONE: CPT

## 2019-10-24 PROCEDURE — 83735 ASSAY OF MAGNESIUM: CPT

## 2019-10-24 PROCEDURE — 36415 COLL VENOUS BLD VENIPUNCTURE: CPT

## 2019-10-24 PROCEDURE — 80053 COMPREHEN METABOLIC PANEL: CPT

## 2019-10-24 PROCEDURE — 80061 LIPID PANEL: CPT

## 2020-03-18 ENCOUNTER — HOSPITAL ENCOUNTER (OUTPATIENT)
Dept: LAB | Age: 75
Discharge: HOME | End: 2020-03-18
Payer: MEDICARE

## 2020-03-18 LAB
ALBUMIN SERPL-MCNC: 3.3 G/DL (ref 3.2–4.6)
ALBUMIN/GLOB SERPL: 0.7 {RATIO} (ref 1.2–3.5)
ALP SERPL-CCNC: 112 U/L (ref 50–136)
ALT SERPL-CCNC: 19 U/L (ref 12–65)
ANION GAP SERPL CALC-SCNC: 5 MMOL/L (ref 7–16)
AST SERPL-CCNC: 18 U/L (ref 15–37)
BASOPHILS # BLD: 0 K/UL (ref 0–0.2)
BASOPHILS NFR BLD: 1 % (ref 0–2)
BILIRUB SERPL-MCNC: 0.3 MG/DL (ref 0.2–1.1)
BUN SERPL-MCNC: 19 MG/DL (ref 8–23)
CALCIUM SERPL-MCNC: 8.2 MG/DL (ref 8.3–10.4)
CHLORIDE SERPL-SCNC: 107 MMOL/L (ref 98–107)
CHOLEST SERPL-MCNC: 188 MG/DL
CO2 SERPL-SCNC: 30 MMOL/L (ref 21–32)
CREAT SERPL-MCNC: 0.96 MG/DL (ref 0.6–1)
DIFFERENTIAL METHOD BLD: ABNORMAL
EOSINOPHIL # BLD: 0.2 K/UL (ref 0–0.8)
EOSINOPHIL NFR BLD: 3 % (ref 0.5–7.8)
ERYTHROCYTE [DISTWIDTH] IN BLOOD BY AUTOMATED COUNT: 14.6 % (ref 11.9–14.6)
GLOBULIN SER CALC-MCNC: 4.7 G/DL (ref 2.3–3.5)
GLUCOSE SERPL-MCNC: 88 MG/DL (ref 65–100)
HCT VFR BLD AUTO: 39.1 % (ref 35.8–46.3)
HDLC SERPL-MCNC: 50 MG/DL (ref 40–60)
HDLC SERPL: 3.8 {RATIO}
HGB BLD-MCNC: 12.3 G/DL (ref 11.7–15.4)
IMM GRANULOCYTES # BLD AUTO: 0 K/UL (ref 0–0.5)
IMM GRANULOCYTES NFR BLD AUTO: 0 % (ref 0–5)
LDLC SERPL CALC-MCNC: 114.8 MG/DL
LIPID PROFILE,FLP: ABNORMAL
LYMPHOCYTES # BLD: 3.2 K/UL (ref 0.5–4.6)
LYMPHOCYTES NFR BLD: 51 % (ref 13–44)
MCH RBC QN AUTO: 26.7 PG (ref 26.1–32.9)
MCHC RBC AUTO-ENTMCNC: 31.5 G/DL (ref 31.4–35)
MCV RBC AUTO: 85 FL (ref 79.6–97.8)
MONOCYTES # BLD: 0.6 K/UL (ref 0.1–1.3)
MONOCYTES NFR BLD: 10 % (ref 4–12)
NEUTS SEG # BLD: 2.3 K/UL (ref 1.7–8.2)
NEUTS SEG NFR BLD: 36 % (ref 43–78)
NRBC # BLD: 0 K/UL (ref 0–0.2)
PLATELET # BLD AUTO: 225 K/UL (ref 150–450)
PMV BLD AUTO: 11.5 FL (ref 9.4–12.3)
POTASSIUM SERPL-SCNC: 3.3 MMOL/L (ref 3.5–5.1)
PROT SERPL-MCNC: 8 G/DL (ref 6.3–8.2)
RBC # BLD AUTO: 4.6 M/UL (ref 4.05–5.2)
SODIUM SERPL-SCNC: 142 MMOL/L (ref 136–145)
T4 FREE SERPL-MCNC: 1 NG/DL (ref 0.9–1.8)
TRIGL SERPL-MCNC: 116 MG/DL (ref 35–150)
TSH SERPL DL<=0.005 MIU/L-ACNC: 1.71 UIU/ML (ref 0.36–3.74)
VLDLC SERPL CALC-MCNC: 23.2 MG/DL (ref 6–23)
WBC # BLD AUTO: 6.4 K/UL (ref 4.3–11.1)

## 2020-03-18 PROCEDURE — 85025 COMPLETE CBC W/AUTO DIFF WBC: CPT

## 2020-03-18 PROCEDURE — 36415 COLL VENOUS BLD VENIPUNCTURE: CPT

## 2020-03-18 PROCEDURE — 84439 ASSAY OF FREE THYROXINE: CPT

## 2020-03-18 PROCEDURE — 84443 ASSAY THYROID STIM HORMONE: CPT

## 2020-03-18 PROCEDURE — 80061 LIPID PANEL: CPT

## 2020-03-18 PROCEDURE — 80053 COMPREHEN METABOLIC PANEL: CPT

## 2020-03-20 NOTE — PROGRESS NOTES
Reviewed results and patient verbalizes understanding. Due to COIVD-19 and pt having sponsorship, pt will report to outpatient center in 2 wks for repeat labs. Orders placed.

## 2020-04-01 ENCOUNTER — HOSPITAL ENCOUNTER (OUTPATIENT)
Dept: LAB | Age: 75
Discharge: HOME | End: 2020-04-01
Payer: MEDICARE

## 2020-04-01 LAB
ANION GAP SERPL CALC-SCNC: 9 MMOL/L (ref 7–16)
BUN SERPL-MCNC: 17 MG/DL (ref 8–23)
CALCIUM SERPL-MCNC: 8.5 MG/DL (ref 8.3–10.4)
CHLORIDE SERPL-SCNC: 109 MMOL/L (ref 98–107)
CO2 SERPL-SCNC: 24 MMOL/L (ref 21–32)
CREAT SERPL-MCNC: 0.95 MG/DL (ref 0.6–1)
GLUCOSE SERPL-MCNC: 88 MG/DL (ref 65–100)
POTASSIUM SERPL-SCNC: 3.4 MMOL/L (ref 3.5–5.1)
SODIUM SERPL-SCNC: 142 MMOL/L (ref 136–145)

## 2020-04-01 PROCEDURE — 80048 BASIC METABOLIC PNL TOTAL CA: CPT

## 2020-04-01 PROCEDURE — 36415 COLL VENOUS BLD VENIPUNCTURE: CPT

## 2020-04-02 ENCOUNTER — HOSPITAL ENCOUNTER (OUTPATIENT)
Dept: LAB | Age: 75
Discharge: HOME | End: 2020-04-02
Payer: MEDICARE

## 2020-04-02 DIAGNOSIS — R30.0 DYSURIA: ICD-10-CM

## 2020-04-02 LAB
APPEARANCE UR: CLEAR
BACTERIA URNS QL MICRO: 0 /HPF
BILIRUB UR QL: NEGATIVE
CASTS URNS QL MICRO: ABNORMAL /LPF
COLOR UR: YELLOW
EPI CELLS #/AREA URNS HPF: ABNORMAL /HPF
GLUCOSE UR STRIP.AUTO-MCNC: NEGATIVE MG/DL
HGB UR QL STRIP: ABNORMAL
KETONES UR QL STRIP.AUTO: NEGATIVE MG/DL
LEUKOCYTE ESTERASE UR QL STRIP.AUTO: ABNORMAL
NITRITE UR QL STRIP.AUTO: NEGATIVE
PH UR STRIP: 7 [PH] (ref 5–9)
PROT UR STRIP-MCNC: NEGATIVE MG/DL
RBC #/AREA URNS HPF: ABNORMAL /HPF
SP GR UR REFRACTOMETRY: 1.01 (ref 1–1.02)
UROBILINOGEN UR QL STRIP.AUTO: 0.2 EU/DL (ref 0.2–1)
WBC URNS QL MICRO: ABNORMAL /HPF

## 2020-04-02 PROCEDURE — 81001 URINALYSIS AUTO W/SCOPE: CPT

## 2020-04-02 NOTE — PROGRESS NOTES
Her potassium remains low.   Have her take her potassium 2 tabs daily for 1 day then 1 tab daily the next day and alternate that way 2 tabs 1 day 1 tab the next day and in 2 weeks however go to the [facility] drawing station for [employer] to get a BMP thank you

## 2020-04-02 NOTE — PROGRESS NOTES
Reviewed results and patient verbalizes understanding. Pt states she will report to the outpatient center in 2 wks for repeat labs.

## 2020-04-27 ENCOUNTER — HOSPITAL ENCOUNTER (OUTPATIENT)
Dept: LAB | Age: 75
Discharge: HOME | End: 2020-04-27
Payer: MEDICARE

## 2020-04-27 DIAGNOSIS — E11.21 TYPE 2 DIABETES MELLITUS WITH NEPHROPATHY (HCC): ICD-10-CM

## 2020-04-27 DIAGNOSIS — I10 ESSENTIAL HYPERTENSION: ICD-10-CM

## 2020-04-27 DIAGNOSIS — R74.8 ELEVATED ALKALINE PHOSPHATASE LEVEL: ICD-10-CM

## 2020-04-27 DIAGNOSIS — D69.6 THROMBOCYTOPENIA (HCC): ICD-10-CM

## 2020-04-27 DIAGNOSIS — N18.30 CHRONIC KIDNEY DISEASE, STAGE III (MODERATE) (HCC): ICD-10-CM

## 2020-04-27 DIAGNOSIS — K51.018: ICD-10-CM

## 2020-04-27 DIAGNOSIS — D64.9 CHRONIC ANEMIA: ICD-10-CM

## 2020-04-27 DIAGNOSIS — R53.81 DEBILITY: ICD-10-CM

## 2020-04-27 DIAGNOSIS — E87.6 HYPOKALEMIA: ICD-10-CM

## 2020-04-27 DIAGNOSIS — R74.8 ELEVATED LIVER ENZYMES: ICD-10-CM

## 2020-04-27 LAB
ALBUMIN SERPL-MCNC: 3.6 G/DL (ref 3.2–4.6)
ALBUMIN/GLOB SERPL: 0.8 {RATIO} (ref 1.2–3.5)
ALP SERPL-CCNC: 126 U/L (ref 50–136)
ALT SERPL-CCNC: 21 U/L (ref 12–65)
ANION GAP SERPL CALC-SCNC: 4 MMOL/L (ref 7–16)
AST SERPL-CCNC: 18 U/L (ref 15–37)
BASOPHILS # BLD: 0.1 K/UL (ref 0–0.2)
BASOPHILS NFR BLD: 1 % (ref 0–2)
BILIRUB SERPL-MCNC: 0.3 MG/DL (ref 0.2–1.1)
BUN SERPL-MCNC: 17 MG/DL (ref 8–23)
CALCIUM SERPL-MCNC: 8.1 MG/DL (ref 8.3–10.4)
CHLORIDE SERPL-SCNC: 108 MMOL/L (ref 98–107)
CO2 SERPL-SCNC: 29 MMOL/L (ref 21–32)
CREAT SERPL-MCNC: 1.06 MG/DL (ref 0.6–1)
DIFFERENTIAL METHOD BLD: ABNORMAL
EOSINOPHIL # BLD: 0.2 K/UL (ref 0–0.8)
EOSINOPHIL NFR BLD: 3 % (ref 0.5–7.8)
ERYTHROCYTE [DISTWIDTH] IN BLOOD BY AUTOMATED COUNT: 13.9 % (ref 11.9–14.6)
GLOBULIN SER CALC-MCNC: 4.6 G/DL (ref 2.3–3.5)
GLUCOSE SERPL-MCNC: 88 MG/DL (ref 65–100)
HCT VFR BLD AUTO: 39.7 % (ref 35.8–46.3)
HGB BLD-MCNC: 12.3 G/DL (ref 11.7–15.4)
IMM GRANULOCYTES # BLD AUTO: 0 K/UL (ref 0–0.5)
IMM GRANULOCYTES NFR BLD AUTO: 0 % (ref 0–5)
LYMPHOCYTES # BLD: 2.5 K/UL (ref 0.5–4.6)
LYMPHOCYTES NFR BLD: 42 % (ref 13–44)
MCH RBC QN AUTO: 26.7 PG (ref 26.1–32.9)
MCHC RBC AUTO-ENTMCNC: 31 G/DL (ref 31.4–35)
MCV RBC AUTO: 86.1 FL (ref 79.6–97.8)
MONOCYTES # BLD: 0.5 K/UL (ref 0.1–1.3)
MONOCYTES NFR BLD: 9 % (ref 4–12)
NEUTS SEG # BLD: 2.7 K/UL (ref 1.7–8.2)
NEUTS SEG NFR BLD: 45 % (ref 43–78)
NRBC # BLD: 0 K/UL (ref 0–0.2)
PLATELET # BLD AUTO: 249 K/UL (ref 150–450)
PMV BLD AUTO: 11.8 FL (ref 9.4–12.3)
POTASSIUM SERPL-SCNC: 3.3 MMOL/L (ref 3.5–5.1)
PROT SERPL-MCNC: 8.2 G/DL (ref 6.3–8.2)
RBC # BLD AUTO: 4.61 M/UL (ref 4.05–5.2)
SODIUM SERPL-SCNC: 141 MMOL/L (ref 136–145)
T4 FREE SERPL-MCNC: 1.1 NG/DL (ref 0.78–1.46)
TSH SERPL DL<=0.005 MIU/L-ACNC: 1.39 UIU/ML (ref 0.36–3.74)
WBC # BLD AUTO: 6 K/UL (ref 4.3–11.1)

## 2020-04-27 PROCEDURE — 80053 COMPREHEN METABOLIC PANEL: CPT

## 2020-04-27 PROCEDURE — 84439 ASSAY OF FREE THYROXINE: CPT

## 2020-04-27 PROCEDURE — 36415 COLL VENOUS BLD VENIPUNCTURE: CPT

## 2020-04-27 PROCEDURE — 85025 COMPLETE CBC W/AUTO DIFF WBC: CPT

## 2020-04-27 PROCEDURE — 84443 ASSAY THYROID STIM HORMONE: CPT

## 2020-04-27 NOTE — PROGRESS NOTES
Potassium remains low. How many times a day she taking her potassium replacement? .  She needs to be on 10 mEq twice daily. Make sure she is taking it this way and she is also had a slight decrease in renal function so I want her to go to the outpatient lab in 2 weeks for repeat BMP. We will follow-up afterward.

## 2020-05-11 ENCOUNTER — HOSPITAL ENCOUNTER (OUTPATIENT)
Dept: LAB | Age: 75
Discharge: HOME | End: 2020-05-11
Payer: MEDICARE

## 2020-05-11 DIAGNOSIS — E87.6 HYPOKALEMIA: ICD-10-CM

## 2020-05-11 DIAGNOSIS — N28.9 RENAL INSUFFICIENCY, MILD: ICD-10-CM

## 2020-05-11 DIAGNOSIS — E83.51 HYPOCALCEMIA: ICD-10-CM

## 2020-05-11 LAB
ANION GAP SERPL CALC-SCNC: 12 MMOL/L (ref 7–16)
BUN SERPL-MCNC: 17 MG/DL (ref 8–23)
CALCIUM SERPL-MCNC: 8.4 MG/DL (ref 8.3–10.4)
CHLORIDE SERPL-SCNC: 110 MMOL/L (ref 98–107)
CO2 SERPL-SCNC: 20 MMOL/L (ref 21–32)
CREAT SERPL-MCNC: 1.08 MG/DL (ref 0.6–1)
GLUCOSE SERPL-MCNC: 149 MG/DL (ref 65–100)
POTASSIUM SERPL-SCNC: 3.9 MMOL/L (ref 3.5–5.1)
SODIUM SERPL-SCNC: 142 MMOL/L (ref 136–145)

## 2020-05-11 PROCEDURE — 36415 COLL VENOUS BLD VENIPUNCTURE: CPT

## 2020-05-11 PROCEDURE — 80048 BASIC METABOLIC PNL TOTAL CA: CPT

## 2020-05-11 NOTE — PROGRESS NOTES
Labs are stable with a sugar of 149. Calcium is slightly low. Is she taking a calcium replacement? We need to make an appointment for her within the next 4 to 6 weeks.

## 2020-06-22 ENCOUNTER — HOSPITAL ENCOUNTER (OUTPATIENT)
Dept: LAB | Age: 75
Discharge: HOME | End: 2020-06-22
Payer: MEDICARE

## 2020-06-22 LAB
ALBUMIN SERPL-MCNC: 3.1 G/DL (ref 3.2–4.6)
ALBUMIN/GLOB SERPL: 0.7 {RATIO} (ref 1.2–3.5)
ALP SERPL-CCNC: 115 U/L (ref 50–136)
ALT SERPL-CCNC: 21 U/L (ref 12–65)
ANION GAP SERPL CALC-SCNC: 6 MMOL/L (ref 7–16)
AST SERPL-CCNC: 18 U/L (ref 15–37)
BILIRUB SERPL-MCNC: 0.3 MG/DL (ref 0.2–1.1)
BUN SERPL-MCNC: 13 MG/DL (ref 8–23)
CALCIUM SERPL-MCNC: 8 MG/DL (ref 8.3–10.4)
CHLORIDE SERPL-SCNC: 109 MMOL/L (ref 98–107)
CHOLEST SERPL-MCNC: 126 MG/DL
CO2 SERPL-SCNC: 28 MMOL/L (ref 21–32)
CREAT SERPL-MCNC: 1.07 MG/DL (ref 0.6–1)
GLOBULIN SER CALC-MCNC: 4.6 G/DL (ref 2.3–3.5)
GLUCOSE SERPL-MCNC: 166 MG/DL (ref 65–100)
HDLC SERPL-MCNC: 43 MG/DL (ref 40–60)
HDLC SERPL: 2.9 {RATIO}
LDLC SERPL CALC-MCNC: 54.6 MG/DL
LIPID PROFILE,FLP: ABNORMAL
POTASSIUM SERPL-SCNC: 3.7 MMOL/L (ref 3.5–5.1)
PROT SERPL-MCNC: 7.7 G/DL (ref 6.3–8.2)
SODIUM SERPL-SCNC: 143 MMOL/L (ref 136–145)
TRIGL SERPL-MCNC: 142 MG/DL (ref 35–150)
VLDLC SERPL CALC-MCNC: 28.4 MG/DL (ref 6–23)

## 2020-06-22 PROCEDURE — 80053 COMPREHEN METABOLIC PANEL: CPT

## 2020-06-22 PROCEDURE — 36415 COLL VENOUS BLD VENIPUNCTURE: CPT

## 2020-06-22 PROCEDURE — 80061 LIPID PANEL: CPT

## 2020-06-23 NOTE — PROGRESS NOTES
Reviewed results and patient verbalizes understanding. F/u scheduled.
Sugar level is slightly elevated 166. She needs to follow a sugar restricted diet and we need to follow-up on this. Her calcium is slightly low. Is she on a calcium replacement? If not have her take at least 1 of the calcium over-the-counter tabs daily. Her cholesterol was fine and I would like for her to stay off the atorvastatin for now and recheck here in 4 to 6 weeks.
etoh abuse

## 2020-07-01 ENCOUNTER — HOSPITAL ENCOUNTER (OUTPATIENT)
Dept: ULTRASOUND IMAGING | Age: 75
Discharge: HOME | End: 2020-07-01
Attending: FAMILY MEDICINE
Payer: MEDICARE

## 2020-07-01 DIAGNOSIS — M54.2 NECK PAIN: ICD-10-CM

## 2020-07-01 PROCEDURE — 76536 US EXAM OF HEAD AND NECK: CPT

## 2020-07-01 NOTE — PROGRESS NOTES
Jeny has a small lymph node in the left neck that appears to be normal.  I want to follow-up on this. Thyroid is normal.  Make an appointment for about 2 weeks for me to check this lymph node.

## 2020-07-27 ENCOUNTER — HOSPITAL ENCOUNTER (OUTPATIENT)
Dept: LAB | Age: 75
Discharge: HOME | End: 2020-07-27
Payer: MEDICARE

## 2020-07-27 LAB
ALBUMIN SERPL-MCNC: 3.4 G/DL (ref 3.2–4.6)
ALBUMIN/GLOB SERPL: 0.8 {RATIO} (ref 1.2–3.5)
ALP SERPL-CCNC: 111 U/L (ref 50–136)
ALT SERPL-CCNC: 19 U/L (ref 12–65)
ANION GAP SERPL CALC-SCNC: 1 MMOL/L (ref 7–16)
AST SERPL-CCNC: 19 U/L (ref 15–37)
BASOPHILS # BLD: 0.1 K/UL (ref 0–0.2)
BASOPHILS NFR BLD: 1 % (ref 0–2)
BILIRUB SERPL-MCNC: 0.2 MG/DL (ref 0.2–1.1)
BUN SERPL-MCNC: 15 MG/DL (ref 8–23)
CALCIUM SERPL-MCNC: 8.6 MG/DL (ref 8.3–10.4)
CHLORIDE SERPL-SCNC: 111 MMOL/L (ref 98–107)
CHOLEST SERPL-MCNC: 249 MG/DL
CO2 SERPL-SCNC: 29 MMOL/L (ref 21–32)
CREAT SERPL-MCNC: 1.07 MG/DL (ref 0.6–1)
DIFFERENTIAL METHOD BLD: ABNORMAL
EOSINOPHIL # BLD: 0.1 K/UL (ref 0–0.8)
EOSINOPHIL NFR BLD: 2 % (ref 0.5–7.8)
ERYTHROCYTE [DISTWIDTH] IN BLOOD BY AUTOMATED COUNT: 14.1 % (ref 11.9–14.6)
GLOBULIN SER CALC-MCNC: 4.5 G/DL (ref 2.3–3.5)
GLUCOSE SERPL-MCNC: 92 MG/DL (ref 65–100)
HCT VFR BLD AUTO: 39.7 % (ref 35.8–46.3)
HDLC SERPL-MCNC: 40 MG/DL (ref 40–60)
HDLC SERPL: 6.2 {RATIO}
HGB BLD-MCNC: 12.5 G/DL (ref 11.7–15.4)
IMM GRANULOCYTES # BLD AUTO: 0 K/UL (ref 0–0.5)
IMM GRANULOCYTES NFR BLD AUTO: 0 % (ref 0–5)
LDLC SERPL CALC-MCNC: 167.6 MG/DL
LIPID PROFILE,FLP: ABNORMAL
LYMPHOCYTES # BLD: 3.1 K/UL (ref 0.5–4.6)
LYMPHOCYTES NFR BLD: 49 % (ref 13–44)
MAGNESIUM SERPL-MCNC: 2.2 MG/DL (ref 1.8–2.4)
MCH RBC QN AUTO: 26.9 PG (ref 26.1–32.9)
MCHC RBC AUTO-ENTMCNC: 31.5 G/DL (ref 31.4–35)
MCV RBC AUTO: 85.6 FL (ref 79.6–97.8)
MONOCYTES # BLD: 0.7 K/UL (ref 0.1–1.3)
MONOCYTES NFR BLD: 12 % (ref 4–12)
NEUTS SEG # BLD: 2.2 K/UL (ref 1.7–8.2)
NEUTS SEG NFR BLD: 35 % (ref 43–78)
NRBC # BLD: 0 K/UL (ref 0–0.2)
PLATELET # BLD AUTO: 165 K/UL (ref 150–450)
PMV BLD AUTO: 12.4 FL (ref 9.4–12.3)
POTASSIUM SERPL-SCNC: 4.3 MMOL/L (ref 3.5–5.1)
PROT SERPL-MCNC: 7.9 G/DL (ref 6.3–8.2)
RBC # BLD AUTO: 4.64 M/UL (ref 4.05–5.2)
SODIUM SERPL-SCNC: 141 MMOL/L (ref 136–145)
T4 FREE SERPL-MCNC: 1.2 NG/DL (ref 0.78–1.46)
TRIGL SERPL-MCNC: 207 MG/DL (ref 35–150)
TSH SERPL DL<=0.005 MIU/L-ACNC: 1.43 UIU/ML (ref 0.36–3.74)
VLDLC SERPL CALC-MCNC: 41.4 MG/DL (ref 6–23)
WBC # BLD AUTO: 6.2 K/UL (ref 4.3–11.1)

## 2020-07-27 PROCEDURE — 87086 URINE CULTURE/COLONY COUNT: CPT

## 2020-07-27 PROCEDURE — 85025 COMPLETE CBC W/AUTO DIFF WBC: CPT

## 2020-07-27 PROCEDURE — 84443 ASSAY THYROID STIM HORMONE: CPT

## 2020-07-27 PROCEDURE — 80053 COMPREHEN METABOLIC PANEL: CPT

## 2020-07-27 PROCEDURE — 36415 COLL VENOUS BLD VENIPUNCTURE: CPT

## 2020-07-27 PROCEDURE — 80061 LIPID PANEL: CPT

## 2020-07-27 PROCEDURE — 83735 ASSAY OF MAGNESIUM: CPT

## 2020-07-27 PROCEDURE — 84439 ASSAY OF FREE THYROXINE: CPT

## 2020-07-27 NOTE — PROGRESS NOTES
Her cholesterol has gone up to 249 since we stopped her Lipitor. However I am going to call in Crestor which is rosuvastatin instead of the Lipitor and have her start 1 tab daily and recheck here in 1 month with 1 of our new providers. She needs follow-up on her elevated proteins.

## 2020-07-29 LAB
BACTERIA SPEC CULT: NORMAL
SERVICE CMNT-IMP: NORMAL

## 2020-08-13 ENCOUNTER — HOSPITAL ENCOUNTER (OUTPATIENT)
Dept: LAB | Age: 75
Discharge: HOME | End: 2020-08-13
Payer: MEDICARE

## 2020-08-13 LAB
BASOPHILS # BLD: 0.1 K/UL (ref 0–0.2)
BASOPHILS NFR BLD: 1 % (ref 0–2)
DIFFERENTIAL METHOD BLD: ABNORMAL
EOSINOPHIL # BLD: 0.1 K/UL (ref 0–0.8)
EOSINOPHIL NFR BLD: 2 % (ref 0.5–7.8)
ERYTHROCYTE [DISTWIDTH] IN BLOOD BY AUTOMATED COUNT: 13.9 % (ref 11.9–14.6)
HCT VFR BLD AUTO: 37.2 % (ref 35.8–46.3)
HGB BLD-MCNC: 11.6 G/DL (ref 11.7–15.4)
IMM GRANULOCYTES # BLD AUTO: 0 K/UL (ref 0–0.5)
IMM GRANULOCYTES NFR BLD AUTO: 0 % (ref 0–5)
LYMPHOCYTES # BLD: 4 K/UL (ref 0.5–4.6)
LYMPHOCYTES NFR BLD: 54 % (ref 13–44)
MCH RBC QN AUTO: 26.6 PG (ref 26.1–32.9)
MCHC RBC AUTO-ENTMCNC: 31.2 G/DL (ref 31.4–35)
MCV RBC AUTO: 85.3 FL (ref 79.6–97.8)
MONOCYTES # BLD: 0.6 K/UL (ref 0.1–1.3)
MONOCYTES NFR BLD: 7 % (ref 4–12)
NEUTS SEG # BLD: 2.7 K/UL (ref 1.7–8.2)
NEUTS SEG NFR BLD: 37 % (ref 43–78)
NRBC # BLD: 0 K/UL (ref 0–0.2)
PLATELET # BLD AUTO: 238 K/UL (ref 150–450)
PMV BLD AUTO: 10.8 FL (ref 9.4–12.3)
RBC # BLD AUTO: 4.36 M/UL (ref 4.05–5.2)
WBC # BLD AUTO: 7.5 K/UL (ref 4.3–11.1)

## 2020-08-13 PROCEDURE — 36415 COLL VENOUS BLD VENIPUNCTURE: CPT

## 2020-08-13 PROCEDURE — 85025 COMPLETE CBC W/AUTO DIFF WBC: CPT

## 2020-09-09 ENCOUNTER — HOSPITAL ENCOUNTER (OUTPATIENT)
Dept: MAMMOGRAPHY | Age: 75
Discharge: HOME | End: 2020-09-09
Attending: FAMILY MEDICINE

## 2020-09-09 DIAGNOSIS — Z12.31 SCREENING MAMMOGRAM, ENCOUNTER FOR: ICD-10-CM

## 2020-09-10 NOTE — PROGRESS NOTES
This is a former patient Dr. Mandi Vazquez; mammogram results, per radiologist: \"IMPRESSION: No mammographic evidence of malignancy. \"  Thank you, Dr. Hand

## 2020-09-10 NOTE — PROGRESS NOTES
Follow-up message; I reviewed the body of this report and it does mention a stable mass in the upper outer quadrant left breast, posterior depth; I did contact Dr. Mary Alice Hernandez radiologist who evaluated this patient; he informs me that this is most likely an intra-mamillary lymph node that has been stable for a number of years; at this time, he is not concerned regarding this and this does not need further evaluation at this time

## 2020-09-14 ENCOUNTER — HOSPITAL ENCOUNTER (OUTPATIENT)
Dept: LAB | Age: 75
Discharge: HOME | End: 2020-09-14
Payer: MEDICARE

## 2020-09-14 LAB
ALBUMIN SERPL-MCNC: 3.3 G/DL (ref 3.2–4.6)
ALBUMIN/GLOB SERPL: 0.6 {RATIO} (ref 1.2–3.5)
ALP SERPL-CCNC: 94 U/L (ref 50–136)
ALT SERPL-CCNC: 17 U/L (ref 12–65)
ANION GAP SERPL CALC-SCNC: 3 MMOL/L (ref 7–16)
APPEARANCE UR: ABNORMAL
AST SERPL-CCNC: 14 U/L (ref 15–37)
BACTERIA URNS QL MICRO: ABNORMAL /HPF
BASOPHILS # BLD: 0.1 K/UL (ref 0–0.2)
BASOPHILS NFR BLD: 1 % (ref 0–2)
BILIRUB SERPL-MCNC: 0.4 MG/DL (ref 0.2–1.1)
BILIRUB UR QL: NEGATIVE
BUN SERPL-MCNC: 14 MG/DL (ref 8–23)
CALCIUM SERPL-MCNC: 8.9 MG/DL (ref 8.3–10.4)
CHLORIDE SERPL-SCNC: 106 MMOL/L (ref 98–107)
CO2 SERPL-SCNC: 30 MMOL/L (ref 21–32)
COLOR UR: YELLOW
CREAT SERPL-MCNC: 1.12 MG/DL (ref 0.6–1)
DIFFERENTIAL METHOD BLD: NORMAL
EOSINOPHIL # BLD: 0.1 K/UL (ref 0–0.8)
EOSINOPHIL NFR BLD: 1 % (ref 0.5–7.8)
EPI CELLS #/AREA URNS HPF: ABNORMAL /HPF
ERYTHROCYTE [DISTWIDTH] IN BLOOD BY AUTOMATED COUNT: 13.8 % (ref 11.9–14.6)
GLOBULIN SER CALC-MCNC: 5.3 G/DL (ref 2.3–3.5)
GLUCOSE SERPL-MCNC: 134 MG/DL (ref 65–100)
GLUCOSE UR STRIP.AUTO-MCNC: 250 MG/DL
HCT VFR BLD AUTO: 39.9 % (ref 35.8–46.3)
HGB BLD-MCNC: 12.7 G/DL (ref 11.7–15.4)
HGB UR QL STRIP: ABNORMAL
IMM GRANULOCYTES # BLD AUTO: 0 K/UL (ref 0–0.5)
IMM GRANULOCYTES NFR BLD AUTO: 0 % (ref 0–5)
KETONES UR QL STRIP.AUTO: NEGATIVE MG/DL
LEUKOCYTE ESTERASE UR QL STRIP.AUTO: ABNORMAL
LYMPHOCYTES # BLD: 2.6 K/UL (ref 0.5–4.6)
LYMPHOCYTES NFR BLD: 24 % (ref 13–44)
MCH RBC QN AUTO: 27.1 PG (ref 26.1–32.9)
MCHC RBC AUTO-ENTMCNC: 31.8 G/DL (ref 31.4–35)
MCV RBC AUTO: 85.3 FL (ref 79.6–97.8)
MONOCYTES # BLD: 1 K/UL (ref 0.1–1.3)
MONOCYTES NFR BLD: 9 % (ref 4–12)
NEUTS SEG # BLD: 7 K/UL (ref 1.7–8.2)
NEUTS SEG NFR BLD: 65 % (ref 43–78)
NITRITE UR QL STRIP.AUTO: NEGATIVE
NRBC # BLD: 0 K/UL (ref 0–0.2)
OTHER OBSERVATIONS,UCOM: ABNORMAL
PH UR STRIP: 6 [PH] (ref 5–9)
PLATELET # BLD AUTO: 233 K/UL (ref 150–450)
PMV BLD AUTO: 11.1 FL (ref 9.4–12.3)
POTASSIUM SERPL-SCNC: 3.7 MMOL/L (ref 3.5–5.1)
PROT SERPL-MCNC: 8.6 G/DL (ref 6.3–8.2)
PROT UR STRIP-MCNC: 100 MG/DL
RBC # BLD AUTO: 4.68 M/UL (ref 4.05–5.2)
RBC #/AREA URNS HPF: ABNORMAL /HPF
SODIUM SERPL-SCNC: 139 MMOL/L (ref 136–145)
SP GR UR REFRACTOMETRY: 1.01 (ref 1–1.02)
UROBILINOGEN UR QL STRIP.AUTO: 0.2 EU/DL (ref 0.2–1)
WBC # BLD AUTO: 10.7 K/UL (ref 4.3–11.1)
WBC URNS QL MICRO: >100 /HPF
YEAST URNS QL MICRO: ABNORMAL

## 2020-09-14 PROCEDURE — 80053 COMPREHEN METABOLIC PANEL: CPT

## 2020-09-14 PROCEDURE — 85025 COMPLETE CBC W/AUTO DIFF WBC: CPT

## 2020-09-14 PROCEDURE — 36415 COLL VENOUS BLD VENIPUNCTURE: CPT

## 2020-09-14 PROCEDURE — 81001 URINALYSIS AUTO W/SCOPE: CPT

## 2020-09-17 ENCOUNTER — HOSPITAL ENCOUNTER (OUTPATIENT)
Dept: LAB | Age: 75
Discharge: HOME | End: 2020-09-17
Payer: MEDICARE

## 2020-09-17 PROCEDURE — 36415 COLL VENOUS BLD VENIPUNCTURE: CPT

## 2020-09-17 PROCEDURE — 84165 PROTEIN E-PHORESIS SERUM: CPT

## 2020-09-18 LAB
ALBUMIN SERPL ELPH-MCNC: 3.43 G/DL (ref 3.2–5.6)
ALBUMIN/GLOB SERPL: 0.8 {RATIO}
ALPHA1 GLOB SERPL ELPH-MCNC: 0.26 G/DL (ref 0.1–0.4)
ALPHA2 GLOB SERPL ELPH-MCNC: 0.92 G/DL (ref 0.4–1.2)
B-GLOBULIN SERPL QL ELPH: 1.16 G/DL (ref 0.6–1.3)
GAMMA GLOB MFR SERPL ELPH: 2.03 G/DL (ref 0.5–1.6)
M PROTEIN SERPL ELPH-MCNC: ABNORMAL G/DL
PROT PATTERN SERPL ELPH-IMP: ABNORMAL
PROT SERPL-MCNC: 7.8 G/DL (ref 6.3–8.2)

## 2020-09-23 ENCOUNTER — HOSPITAL ENCOUNTER (OUTPATIENT)
Dept: LAB | Age: 75
Discharge: HOME | End: 2020-09-23
Payer: MEDICARE

## 2020-09-23 PROCEDURE — 87186 SC STD MICRODIL/AGAR DIL: CPT

## 2020-09-23 PROCEDURE — 87088 URINE BACTERIA CULTURE: CPT

## 2020-09-23 PROCEDURE — 87086 URINE CULTURE/COLONY COUNT: CPT

## 2020-09-25 LAB
BACTERIA SPEC CULT: ABNORMAL
SERVICE CMNT-IMP: ABNORMAL

## 2020-09-29 PROBLEM — J45.909 UNCOMPLICATED ASTHMA: Status: ACTIVE | Noted: 2020-09-29

## 2020-10-20 ENCOUNTER — HOSPITAL ENCOUNTER (OUTPATIENT)
Dept: LAB | Age: 75
Discharge: HOME | End: 2020-10-20
Payer: MEDICARE

## 2020-10-20 LAB — MAGNESIUM SERPL-MCNC: 2.1 MG/DL (ref 1.8–2.4)

## 2020-10-20 PROCEDURE — 83735 ASSAY OF MAGNESIUM: CPT

## 2020-10-20 PROCEDURE — 36415 COLL VENOUS BLD VENIPUNCTURE: CPT

## 2020-12-17 ENCOUNTER — HOSPITAL ENCOUNTER (EMERGENCY)
Age: 75
Discharge: HOME | End: 2020-12-17
Attending: EMERGENCY MEDICINE
Payer: MEDICARE

## 2020-12-17 ENCOUNTER — APPOINTMENT (OUTPATIENT)
Dept: GENERAL RADIOLOGY | Age: 75
End: 2020-12-17
Attending: EMERGENCY MEDICINE
Payer: MEDICARE

## 2020-12-17 VITALS
OXYGEN SATURATION: 99 % | SYSTOLIC BLOOD PRESSURE: 167 MMHG | BODY MASS INDEX: 32.1 KG/M2 | WEIGHT: 170 LBS | HEART RATE: 72 BPM | TEMPERATURE: 97.9 F | RESPIRATION RATE: 17 BRPM | HEIGHT: 61 IN | DIASTOLIC BLOOD PRESSURE: 91 MMHG

## 2020-12-17 DIAGNOSIS — U07.1 COVID-19 VIRUS INFECTION: ICD-10-CM

## 2020-12-17 DIAGNOSIS — J20.9 ACUTE BRONCHITIS, UNSPECIFIED ORGANISM: Primary | ICD-10-CM

## 2020-12-17 LAB
ALBUMIN SERPL-MCNC: 3.4 G/DL (ref 3.2–4.6)
ALBUMIN/GLOB SERPL: 0.8 {RATIO} (ref 1.2–3.5)
ALP SERPL-CCNC: 86 U/L (ref 50–136)
ALT SERPL-CCNC: 24 U/L (ref 12–65)
ANION GAP SERPL CALC-SCNC: 1 MMOL/L (ref 7–16)
APPEARANCE UR: CLEAR
AST SERPL-CCNC: 38 U/L (ref 15–37)
ATRIAL RATE: 67 BPM
BASOPHILS # BLD: 0 K/UL (ref 0–0.2)
BASOPHILS NFR BLD: 1 % (ref 0–2)
BILIRUB SERPL-MCNC: 0.3 MG/DL (ref 0.2–1.1)
BILIRUB UR QL: NEGATIVE
BUN SERPL-MCNC: 17 MG/DL (ref 8–23)
CALCIUM SERPL-MCNC: 8.5 MG/DL (ref 8.3–10.4)
CALCULATED P AXIS, ECG09: 79 DEGREES
CALCULATED R AXIS, ECG10: 0 DEGREES
CALCULATED T AXIS, ECG11: 48 DEGREES
CHLORIDE SERPL-SCNC: 107 MMOL/L (ref 98–107)
CO2 SERPL-SCNC: 28 MMOL/L (ref 21–32)
COLOR UR: YELLOW
COVID-19 RAPID TEST, COVR: DETECTED
CREAT SERPL-MCNC: 1.21 MG/DL (ref 0.6–1)
DIAGNOSIS, 93000: NORMAL
DIFFERENTIAL METHOD BLD: ABNORMAL
EOSINOPHIL # BLD: 0 K/UL (ref 0–0.8)
EOSINOPHIL NFR BLD: 0 % (ref 0.5–7.8)
ERYTHROCYTE [DISTWIDTH] IN BLOOD BY AUTOMATED COUNT: 14.4 % (ref 11.9–14.6)
GLOBULIN SER CALC-MCNC: 4.5 G/DL (ref 2.3–3.5)
GLUCOSE SERPL-MCNC: 171 MG/DL (ref 65–100)
GLUCOSE UR STRIP.AUTO-MCNC: NEGATIVE MG/DL
HCT VFR BLD AUTO: 40.1 % (ref 35.8–46.3)
HGB BLD-MCNC: 12.7 G/DL (ref 11.7–15.4)
HGB UR QL STRIP: NEGATIVE
IMM GRANULOCYTES # BLD AUTO: 0 K/UL (ref 0–0.5)
IMM GRANULOCYTES NFR BLD AUTO: 0 % (ref 0–5)
KETONES UR QL STRIP.AUTO: NEGATIVE MG/DL
LEUKOCYTE ESTERASE UR QL STRIP.AUTO: NEGATIVE
LYMPHOCYTES # BLD: 2.2 K/UL (ref 0.5–4.6)
LYMPHOCYTES NFR BLD: 68 % (ref 13–44)
MCH RBC QN AUTO: 26.8 PG (ref 26.1–32.9)
MCHC RBC AUTO-ENTMCNC: 31.7 G/DL (ref 31.4–35)
MCV RBC AUTO: 84.8 FL (ref 79.6–97.8)
MONOCYTES # BLD: 0.4 K/UL (ref 0.1–1.3)
MONOCYTES NFR BLD: 12 % (ref 4–12)
NEUTS SEG # BLD: 0.6 K/UL (ref 1.7–8.2)
NEUTS SEG NFR BLD: 19 % (ref 43–78)
NITRITE UR QL STRIP.AUTO: NEGATIVE
NRBC # BLD: 0 K/UL (ref 0–0.2)
P-R INTERVAL, ECG05: 170 MS
PH UR STRIP: 6.5 [PH] (ref 5–9)
PLATELET # BLD AUTO: 183 K/UL (ref 150–450)
PMV BLD AUTO: 12.1 FL (ref 9.4–12.3)
POTASSIUM SERPL-SCNC: 4.8 MMOL/L (ref 3.5–5.1)
PROT SERPL-MCNC: 7.9 G/DL (ref 6.3–8.2)
PROT UR STRIP-MCNC: NEGATIVE MG/DL
Q-T INTERVAL, ECG07: 396 MS
QRS DURATION, ECG06: 80 MS
QTC CALCULATION (BEZET), ECG08: 418 MS
RBC # BLD AUTO: 4.73 M/UL (ref 4.05–5.2)
SODIUM SERPL-SCNC: 136 MMOL/L (ref 136–145)
SOURCE, COVRS: ABNORMAL
SP GR UR REFRACTOMETRY: 1.01 (ref 1–1.02)
UROBILINOGEN UR QL STRIP.AUTO: 0.2 EU/DL (ref 0.2–1)
VENTRICULAR RATE, ECG03: 67 BPM
WBC # BLD AUTO: 3.2 K/UL (ref 4.3–11.1)

## 2020-12-17 PROCEDURE — 74011250636 HC RX REV CODE- 250/636: Performed by: EMERGENCY MEDICINE

## 2020-12-17 PROCEDURE — 93005 ELECTROCARDIOGRAM TRACING: CPT | Performed by: EMERGENCY MEDICINE

## 2020-12-17 PROCEDURE — 99283 EMERGENCY DEPT VISIT LOW MDM: CPT

## 2020-12-17 PROCEDURE — 85025 COMPLETE CBC W/AUTO DIFF WBC: CPT

## 2020-12-17 PROCEDURE — 87635 SARS-COV-2 COVID-19 AMP PRB: CPT

## 2020-12-17 PROCEDURE — 81003 URINALYSIS AUTO W/O SCOPE: CPT

## 2020-12-17 PROCEDURE — 87086 URINE CULTURE/COLONY COUNT: CPT

## 2020-12-17 PROCEDURE — 80053 COMPREHEN METABOLIC PANEL: CPT

## 2020-12-17 PROCEDURE — 71045 X-RAY EXAM CHEST 1 VIEW: CPT

## 2020-12-17 RX ORDER — UREA 10 %
220 LOTION (ML) TOPICAL DAILY
Qty: 20 TAB | Refills: 0 | Status: SHIPPED | OUTPATIENT
Start: 2020-12-17 | End: 2021-04-14

## 2020-12-17 RX ORDER — AZITHROMYCIN 250 MG/1
TABLET, FILM COATED ORAL
Qty: 6 TAB | Refills: 0 | Status: SHIPPED | OUTPATIENT
Start: 2020-12-17 | End: 2020-12-31 | Stop reason: ALTCHOICE

## 2020-12-17 RX ORDER — PSYLLIUM HUSK 3.4 G/7G
1 POWDER ORAL 2 TIMES DAILY
Qty: 40 TAB | Refills: 0 | Status: SHIPPED | OUTPATIENT
Start: 2020-12-17 | End: 2021-04-14

## 2020-12-17 RX ADMIN — SODIUM CHLORIDE 1000 ML: 900 INJECTION, SOLUTION INTRAVENOUS at 12:37

## 2020-12-17 NOTE — DISCHARGE INSTRUCTIONS
Your Covid swab was positive. This suggests that your 's swab will also be positive. Purchase a pulse oximeter and monitor your oxygen levels for the next 8 days. If you are consistently below 91% return for further evaluation. Complete the Z-Nathanael.   Your chest x-ray and blood work are unremarkable  Get plenty of rest

## 2020-12-17 NOTE — ED NOTES
I have reviewed discharge instructions with the patient. The patient verbalized understanding. Patient left ED via Discharge Method: ambulatory to Home with (spouse). Opportunity for questions and clarification provided. Patient given 1 scripts. No esign       To continue your aftercare when you leave the hospital, you may receive an automated call from our care team to check in on how you are doing. This is a free service and part of our promise to provide the best care and service to meet your aftercare needs.  If you have questions, or wish to unsubscribe from this service please call [phone]. Thank you for Choosing our [facility] Emergency Department.

## 2020-12-17 NOTE — ED PROVIDER NOTES
64-year-old female with a history of Crohn's disease on Remicade presenting for dry hacking cough and fatigue. She reports that she just feels tired whenever she tries to get up and move around. At nighttime when she tries to sleep she has a dry hacking cough with some congestion in her chest that is really unable to get anything up. She reports her  has something similar but he has asthma. They both spoke with her primary care doctor on Monday she felt like that he would benefit from antibiotics while she was not prescribed anything. The history is provided by the patient. Fatigue  This is a new problem. The current episode started more than 2 days ago. The problem has not changed since onset. There was no focality noted. Past Medical History:   Diagnosis Date    Abnormal cardiovascular function study     Acute cholecystitis 12/17/2013    Allergic rhinitis 9/27/2012    Allergic rhinitis due to allergen 7/1/2015    Anemia 9/27/2012    Atrial fibrillation (HCC)     blindness to left eye     Chronic anemia 7/1/2015    Chronic kidney disease, stage III (moderate) 9/27/2012    Crohn's disease ([address]     Diabetes ([address]     DM2 (diabetes mellitus, type 2) ([address] 9/27/2012    Essential hypertension 7/1/2015    Gastroesophageal reflux disease without esophagitis 7/1/2015    GERD (gastroesophageal reflux disease) 9/27/2012    Heart murmur     HLD (hyperlipidemia) 9/27/2012    HTN (hypertension)     Hypokalemia 9/27/2012    Hypothyroidism     Hypothyroidism 9/27/2012    Hypothyroidism due to medication 7/1/2015    Inflammatory bowel disease 7/1/2015    Palpitations     Personal history of asthma-as a child.  8/24/2016    Type 2 diabetes mellitus without complication ([address] 0/9/3507    Ulcerative colitis ([address]        Past Surgical History:   Procedure Laterality Date    HX CARPAL TUNNEL RELEASE      HX CATARACT REMOVAL      to L    HX HEENT      shunt to L eye r/t glaucoma    HX LAP CHOLECYSTECTOMY      HX ORTHOPAEDIC      to back    HX PARTIAL THYROIDECTOMY           Family History:   Problem Relation Age of Onset    Diabetes Mother     Hypertension Mother     Blindness Mother 80    Psychiatric Disorder Other         Anxiety    Heart Disease Neg Hx     Cancer Neg Hx     Breast Cancer Neg Hx        Social History     Socioeconomic History    Marital status:      Spouse name: Not on file    Number of children: Not on file    Years of education: Not on file    Highest education level: Not on file   Occupational History    Not on file   Social Needs    Financial resource strain: Not on file    Food insecurity     Worry: Not on file     Inability: Not on file    Transportation needs     Medical: Not on file     Non-medical: Not on file   Tobacco Use    Smoking status: Never Smoker    Smokeless tobacco: Never Used   Substance and Sexual Activity    Alcohol use: No    Drug use: No    Sexual activity: Not on file   Lifestyle    Physical activity     Days per week: Not on file     Minutes per session: Not on file    Stress: Not on file   Relationships    Social connections     Talks on phone: Not on file     Gets together: Not on file     Attends [employer] service: Not on file     Active member of club or organization: Not on file     Attends meetings of clubs or organizations: Not on file     Relationship status: Not on file    Intimate partner violence     Fear of current or ex partner: Not on file     Emotionally abused: Not on file     Physically abused: Not on file     Forced sexual activity: Not on file   Other Topics Concern    Not on file   Social History Narrative    Not on file         ALLERGIES: Heparin (porcine), Lovenox [enoxaparin], Morphine, and Sulfa (sulfonamide antibiotics)    Review of Systems   Constitutional: Positive for fatigue. Respiratory: Positive for cough. All other systems reviewed and are negative.       Vitals:    12/17/20 1051 BP: (!) 167/91   Pulse: 72   Resp: 17   Temp: 97.9 °F (36.6 °C)   SpO2: 99%   Weight: 77.1 kg (170 lb)   Height: 5' 1\" (1.549 m)            Physical Exam  Vitals signs and nursing note reviewed. Constitutional:       Appearance: She is well-developed. HENT:      Head: Normocephalic and atraumatic. Eyes:      Conjunctiva/sclera: Conjunctivae normal.      Pupils: Pupils are equal, round, and reactive to light. Neck:      Musculoskeletal: Neck supple. Cardiovascular:      Rate and Rhythm: Normal rate and regular rhythm. Pulmonary:      Effort: Pulmonary effort is normal.      Breath sounds: Normal breath sounds. Abdominal:      General: Bowel sounds are normal.      Palpations: Abdomen is soft. Musculoskeletal: Normal range of motion. Skin:     General: Skin is warm and dry. Neurological:      Mental Status: She is alert and oriented to person, place, and time. MDM  Number of Diagnoses or Management Options  Acute bronchitis, unspecified organism  Diagnosis management comments: 70-year-old female presenting for fatigue dry cough but no other real concerning symptom.  is sick as well. On exam the patient appears clinically well her vital signs are normal.  We will get basic labs and a chest x-ray.   Given that they both have the same symptoms I may have her swab for COVID-19 prior to discharge as I do not foresee at this time needing to keep her in the hospital       Amount and/or Complexity of Data Reviewed  Clinical lab tests: ordered and reviewed (Results for orders placed or performed during the hospital encounter of 12/17/20  -CBC WITH AUTOMATED DIFF       Result                      Value             Ref Range           WBC                         3.2 (L)           4.3 - 11.1 K*       RBC                         4.73              4.05 - 5.2 M*       HGB                         12.7              11.7 - 15.4 *       HCT                         40.1              35.8 - 46.3 % MCV                         84.8              79.6 - 97.8 *       MCH                         26.8              26.1 - 32.9 *       MCHC                        31.7              31.4 - 35.0 *       RDW                         14.4              11.9 - 14.6 %       PLATELET                    183               150 - 450 K/*       MPV                         12.1              9.4 - 12.3 FL       ABSOLUTE NRBC               0.00              0.0 - 0.2 K/*       DF                          AUTOMATED                             NEUTROPHILS                 19 (L)            43 - 78 %           LYMPHOCYTES                 68 (H)            13 - 44 %           MONOCYTES                   12                4.0 - 12.0 %        EOSINOPHILS                 0 (L)             0.5 - 7.8 %         BASOPHILS                   1                 0.0 - 2.0 %         IMMATURE GRANULOCYTES       0                 0.0 - 5.0 %         ABS. NEUTROPHILS            0.6 (L)           1.7 - 8.2 K/*       ABS. LYMPHOCYTES            2.2               0.5 - 4.6 K/*       ABS. MONOCYTES              0.4               0.1 - 1.3 K/*       ABS. EOSINOPHILS            0.0               0.0 - 0.8 K/*       ABS. BASOPHILS              0.0               0.0 - 0.2 K/*       ABS. IMM.  GRANS.            0.0               0.0 - 0.5 K/*  -METABOLIC PANEL, COMPREHENSIVE       Result                      Value             Ref Range           Sodium                      136               136 - 145 mm*       Potassium                   4.8               3.5 - 5.1 mm*       Chloride                    107               98 - 107 mmo*       CO2                         28                21 - 32 mmol*       Anion gap                   1 (L)             7 - 16 mmol/L       Glucose                     171 (H)           65 - 100 mg/*       BUN                         17                8 - 23 MG/DL        Creatinine                  1.21 (H)          0.6 - 1.0 MG* GFR est AA                  55 (L)            >60 ml/min/1*       GFR est non-AA              46 (L)            >60 ml/min/1*       Calcium                     8.5               8.3 - 10.4 M*       Bilirubin, total            0.3               0.2 - 1.1 MG*       ALT (SGPT)                  24                12 - 65 U/L         AST (SGOT)                  38 (H)            15 - 37 U/L         Alk. phosphatase            86                50 - 136 U/L        Protein, total              7.9               6.3 - 8.2 g/*       Albumin                     3.4               3.2 - 4.6 g/*       Globulin                    4.5 (H)           2.3 - 3.5 g/*       A-G Ratio                   0.8 (L)           1.2 - 3.5      )  Tests in the radiology section of CPT®: ordered and reviewed (Xr Chest Port    Result Date: 12/17/2020  Portable chest: History: weakness x4 days. Comparison: Chest x-ray 12/08/2016, chest CT 12/10/2016 Findings: A single view of the chest was obtained at 11:48 AM. The cardiac silhouette is mildly enlarged, unchanged. The lungs and pleural spaces are grossly clear. The pulmonary vascularity is within normal limits. Impression: Enlarged cardiac silhouette with grossly clear lungs. Parenchymal opacities on recent chest CT seen previously appear to have resolved.     )  Tests in the medicine section of CPT®: ordered and reviewed  Decide to obtain previous medical records or to obtain history from someone other than the patient: yes  Independent visualization of images, tracings, or specimens: yes    Risk of Complications, Morbidity, and/or Mortality  Presenting problems: high  Diagnostic procedures: high  Management options: moderate  General comments: Patient's physical exam is reassuring vital signs are normal chest x-ray is clear and blood work is unremarkable. SARS swab is negative. Treating as acute bronchitis with azithromycin.     I personally reviewed the patient's vital signs, laboratory tests, and/or radiological findings. I discussed these findings with the patient and their significance. I answered all questions and gave the patient clear return precautions.   The patient was discharged from the emergency department in stable condition        Patient Progress  Patient progress: improved         Procedures

## 2020-12-17 NOTE — ED TRIAGE NOTES
Pt ambulatory with steady gait to triage, mask in place. Pt reports generalized weakness, fatigue, and nausea since Monday, denies fevers or other S/S.

## 2020-12-19 LAB
BACTERIA SPEC CULT: NORMAL
SERVICE CMNT-IMP: NORMAL

## 2020-12-30 PROBLEM — E83.42 HYPOMAGNESEMIA: Status: RESOLVED | Noted: 2017-10-03 | Resolved: 2020-12-30

## 2020-12-30 PROBLEM — R74.8 ELEVATED ALKALINE PHOSPHATASE LEVEL: Status: RESOLVED | Noted: 2018-05-04 | Resolved: 2020-12-30

## 2020-12-30 PROBLEM — J45.909 UNCOMPLICATED ASTHMA: Status: RESOLVED | Noted: 2020-09-29 | Resolved: 2020-12-30

## 2020-12-31 ENCOUNTER — HOSPITAL ENCOUNTER (OUTPATIENT)
Dept: LAB | Age: 75
Discharge: HOME | End: 2020-12-31
Payer: MEDICARE

## 2020-12-31 LAB
ANION GAP SERPL CALC-SCNC: 4 MMOL/L (ref 7–16)
BUN SERPL-MCNC: 14 MG/DL (ref 8–23)
CALCIUM SERPL-MCNC: 8.8 MG/DL (ref 8.3–10.4)
CHLORIDE SERPL-SCNC: 107 MMOL/L (ref 98–107)
CO2 SERPL-SCNC: 30 MMOL/L (ref 21–32)
CREAT SERPL-MCNC: 0.89 MG/DL (ref 0.6–1)
GLUCOSE SERPL-MCNC: 160 MG/DL (ref 65–100)
POTASSIUM SERPL-SCNC: 3.8 MMOL/L (ref 3.5–5.1)
SODIUM SERPL-SCNC: 141 MMOL/L (ref 136–145)
T4 FREE SERPL-MCNC: 1.3 NG/DL (ref 0.78–1.46)
TSH SERPL DL<=0.005 MIU/L-ACNC: 0.7 UIU/ML (ref 0.36–3.74)

## 2020-12-31 PROCEDURE — 80048 BASIC METABOLIC PNL TOTAL CA: CPT

## 2020-12-31 PROCEDURE — 36415 COLL VENOUS BLD VENIPUNCTURE: CPT

## 2020-12-31 PROCEDURE — 84439 ASSAY OF FREE THYROXINE: CPT

## 2020-12-31 PROCEDURE — 84443 ASSAY THYROID STIM HORMONE: CPT

## 2021-01-02 NOTE — PROGRESS NOTES
Thyroid normal, but on the lower end of normal.  She needs to be sure to take her synthroid on an empty stomach and not with any other medications in the morning for best absorption. Glucose elevated, continue to monitor carbohydrate intake and maintain active lifestyle with regular exercise as tolerated.

## 2021-01-11 ENCOUNTER — HOSPITAL ENCOUNTER (OUTPATIENT)
Dept: ULTRASOUND IMAGING | Age: 76
Discharge: HOME | End: 2021-01-11
Attending: NURSE PRACTITIONER
Payer: MEDICARE

## 2021-01-11 DIAGNOSIS — N39.0 RECURRENT UTI: ICD-10-CM

## 2021-01-11 PROCEDURE — 76770 US EXAM ABDO BACK WALL COMP: CPT

## 2021-01-11 NOTE — PROGRESS NOTES
Ultrasound showed normal bladder and kidneys. Continue conservative prevention of UTI. May follow up as needed.

## 2021-03-31 ENCOUNTER — HOSPITAL ENCOUNTER (OUTPATIENT)
Dept: LAB | Age: 76
Discharge: HOME | End: 2021-03-31
Payer: MEDICARE

## 2021-03-31 LAB
ALBUMIN SERPL-MCNC: 3.4 G/DL (ref 3.2–4.6)
ALBUMIN/GLOB SERPL: 0.7 {RATIO} (ref 1.2–3.5)
ALP SERPL-CCNC: 100 U/L (ref 50–136)
ALT SERPL-CCNC: 16 U/L (ref 12–65)
ANION GAP SERPL CALC-SCNC: 3 MMOL/L (ref 7–16)
AST SERPL-CCNC: 18 U/L (ref 15–37)
BASOPHILS # BLD: 0 K/UL (ref 0–0.2)
BASOPHILS NFR BLD: 1 % (ref 0–2)
BILIRUB SERPL-MCNC: 0.2 MG/DL (ref 0.2–1.1)
BUN SERPL-MCNC: 15 MG/DL (ref 8–23)
CALCIUM SERPL-MCNC: 8.8 MG/DL (ref 8.3–10.4)
CHLORIDE SERPL-SCNC: 110 MMOL/L (ref 98–107)
CHOLEST SERPL-MCNC: 133 MG/DL
CO2 SERPL-SCNC: 28 MMOL/L (ref 21–32)
CREAT SERPL-MCNC: 0.91 MG/DL (ref 0.6–1)
CREAT UR-MCNC: <13 MG/DL
CREAT UR-MCNC: <13 MG/DL
DIFFERENTIAL METHOD BLD: ABNORMAL
EOSINOPHIL # BLD: 0.1 K/UL (ref 0–0.8)
EOSINOPHIL NFR BLD: 2 % (ref 0.5–7.8)
ERYTHROCYTE [DISTWIDTH] IN BLOOD BY AUTOMATED COUNT: 14.1 % (ref 11.9–14.6)
EST. AVERAGE GLUCOSE BLD GHB EST-MCNC: 154 MG/DL
GLOBULIN SER CALC-MCNC: 4.9 G/DL (ref 2.3–3.5)
GLUCOSE SERPL-MCNC: 162 MG/DL (ref 65–100)
HBA1C MFR BLD: 7 % (ref 4.2–6.3)
HCT VFR BLD AUTO: 39 % (ref 35.8–46.3)
HDLC SERPL-MCNC: 47 MG/DL (ref 40–60)
HDLC SERPL: 2.8 {RATIO}
HGB BLD-MCNC: 12 G/DL (ref 11.7–15.4)
IMM GRANULOCYTES # BLD AUTO: 0 K/UL (ref 0–0.5)
IMM GRANULOCYTES NFR BLD AUTO: 0 % (ref 0–5)
LDLC SERPL CALC-MCNC: 61.2 MG/DL
LIPID PROFILE,FLP: ABNORMAL
LYMPHOCYTES # BLD: 2.7 K/UL (ref 0.5–4.6)
LYMPHOCYTES NFR BLD: 44 % (ref 13–44)
MAGNESIUM SERPL-MCNC: 2 MG/DL (ref 1.8–2.4)
MCH RBC QN AUTO: 26.3 PG (ref 26.1–32.9)
MCHC RBC AUTO-ENTMCNC: 30.8 G/DL (ref 31.4–35)
MCV RBC AUTO: 85.5 FL (ref 79.6–97.8)
MICROALBUMIN UR-MCNC: 0.63 MG/DL
MICROALBUMIN/CREAT UR-RTO: NORMAL MG/G
MONOCYTES # BLD: 0.5 K/UL (ref 0.1–1.3)
MONOCYTES NFR BLD: 9 % (ref 4–12)
NEUTS SEG # BLD: 2.8 K/UL (ref 1.7–8.2)
NEUTS SEG NFR BLD: 45 % (ref 43–78)
NRBC # BLD: 0 K/UL (ref 0–0.2)
PLATELET # BLD AUTO: 204 K/UL (ref 150–450)
PMV BLD AUTO: 11.6 FL (ref 9.4–12.3)
POTASSIUM SERPL-SCNC: 3.9 MMOL/L (ref 3.5–5.1)
PROT SERPL-MCNC: 8.3 G/DL (ref 6.3–8.2)
PROT UR-MCNC: <5 MG/DL
PROT/CREAT UR-RTO: NORMAL
RBC # BLD AUTO: 4.56 M/UL (ref 4.05–5.2)
SODIUM SERPL-SCNC: 141 MMOL/L (ref 136–145)
TRIGL SERPL-MCNC: 124 MG/DL (ref 35–150)
TSH W FREE THYROID I,TSHELE: 1.11 UIU/ML (ref 0.36–3.74)
VLDLC SERPL CALC-MCNC: 24.8 MG/DL (ref 6–23)
WBC # BLD AUTO: 6.3 K/UL (ref 4.3–11.1)

## 2021-03-31 PROCEDURE — 85025 COMPLETE CBC W/AUTO DIFF WBC: CPT

## 2021-03-31 PROCEDURE — 83036 HEMOGLOBIN GLYCOSYLATED A1C: CPT

## 2021-03-31 PROCEDURE — 36415 COLL VENOUS BLD VENIPUNCTURE: CPT

## 2021-03-31 PROCEDURE — 80061 LIPID PANEL: CPT

## 2021-03-31 PROCEDURE — 80053 COMPREHEN METABOLIC PANEL: CPT

## 2021-03-31 PROCEDURE — 82043 UR ALBUMIN QUANTITATIVE: CPT

## 2021-03-31 PROCEDURE — 84156 ASSAY OF PROTEIN URINE: CPT

## 2021-03-31 PROCEDURE — 83735 ASSAY OF MAGNESIUM: CPT

## 2021-03-31 PROCEDURE — 84443 ASSAY THYROID STIM HORMONE: CPT

## 2021-03-31 NOTE — PROGRESS NOTES
Thyroid, cholesterol, kidneys, liver, magnesium, electrolytes, and complete blood count are stable. Sugar was up avoid sweets and concentrated carbs.

## 2021-04-01 ENCOUNTER — HOSPITAL ENCOUNTER (OUTPATIENT)
Dept: ULTRASOUND IMAGING | Age: 76
Discharge: HOME | End: 2021-04-01
Attending: NURSE PRACTITIONER
Payer: MEDICARE

## 2021-04-01 DIAGNOSIS — M79.662 PAIN AND SWELLING OF LOWER LEG, LEFT: ICD-10-CM

## 2021-04-01 DIAGNOSIS — M79.89 PAIN AND SWELLING OF LOWER LEG, LEFT: ICD-10-CM

## 2021-04-01 PROCEDURE — 93971 EXTREMITY STUDY: CPT

## 2021-04-01 NOTE — PROGRESS NOTES
Urine microalbumin could not be measured due to inadequate specimen. Her A1c is 7.0. I am increasing her metformin to 500 mg twice daily rather than once daily. Please have her schedule phone visit if she has any questions or would like to discuss. Thank you.

## 2021-04-13 ENCOUNTER — APPOINTMENT (OUTPATIENT)
Dept: GENERAL RADIOLOGY | Age: 76
End: 2021-04-13
Attending: EMERGENCY MEDICINE
Payer: MEDICARE

## 2021-04-13 ENCOUNTER — HOSPITAL ENCOUNTER (EMERGENCY)
Age: 76
Discharge: HOME | End: 2021-04-13
Attending: EMERGENCY MEDICINE
Payer: MEDICARE

## 2021-04-13 VITALS
BODY MASS INDEX: 32.1 KG/M2 | DIASTOLIC BLOOD PRESSURE: 74 MMHG | HEIGHT: 61 IN | TEMPERATURE: 97.9 F | SYSTOLIC BLOOD PRESSURE: 178 MMHG | OXYGEN SATURATION: 98 % | WEIGHT: 170 LBS | HEART RATE: 70 BPM | RESPIRATION RATE: 11 BRPM

## 2021-04-13 DIAGNOSIS — R00.2 PALPITATIONS: Primary | ICD-10-CM

## 2021-04-13 LAB
ALBUMIN SERPL-MCNC: 3.5 G/DL (ref 3.2–4.6)
ALBUMIN/GLOB SERPL: 0.7 {RATIO} (ref 1.2–3.5)
ALP SERPL-CCNC: 97 U/L (ref 50–136)
ALT SERPL-CCNC: 17 U/L (ref 12–65)
ANION GAP SERPL CALC-SCNC: 6 MMOL/L (ref 7–16)
AST SERPL-CCNC: 38 U/L (ref 15–37)
ATRIAL RATE: 95 BPM
BASOPHILS # BLD: 0 K/UL (ref 0–0.2)
BASOPHILS NFR BLD: 1 % (ref 0–2)
BILIRUB SERPL-MCNC: 0.4 MG/DL (ref 0.2–1.1)
BUN SERPL-MCNC: 14 MG/DL (ref 8–23)
CALCIUM SERPL-MCNC: 8.9 MG/DL (ref 8.3–10.4)
CALCULATED P AXIS, ECG09: 63 DEGREES
CALCULATED R AXIS, ECG10: -4 DEGREES
CALCULATED T AXIS, ECG11: 72 DEGREES
CHLORIDE SERPL-SCNC: 108 MMOL/L (ref 98–107)
CO2 SERPL-SCNC: 25 MMOL/L (ref 21–32)
CREAT SERPL-MCNC: 1.01 MG/DL (ref 0.6–1)
DIAGNOSIS, 93000: NORMAL
DIFFERENTIAL METHOD BLD: ABNORMAL
EOSINOPHIL # BLD: 0.2 K/UL (ref 0–0.8)
EOSINOPHIL NFR BLD: 2 % (ref 0.5–7.8)
ERYTHROCYTE [DISTWIDTH] IN BLOOD BY AUTOMATED COUNT: 14 % (ref 11.9–14.6)
GLOBULIN SER CALC-MCNC: 5.1 G/DL (ref 2.3–3.5)
GLUCOSE SERPL-MCNC: 133 MG/DL (ref 65–100)
HCT VFR BLD AUTO: 39.4 % (ref 35.8–46.3)
HGB BLD-MCNC: 12.4 G/DL (ref 11.7–15.4)
IMM GRANULOCYTES # BLD AUTO: 0 K/UL (ref 0–0.5)
IMM GRANULOCYTES NFR BLD AUTO: 0 % (ref 0–5)
LIPASE SERPL-CCNC: 101 U/L (ref 73–393)
LYMPHOCYTES # BLD: 4.5 K/UL (ref 0.5–4.6)
LYMPHOCYTES NFR BLD: 52 % (ref 13–44)
MAGNESIUM SERPL-MCNC: 1.8 MG/DL (ref 1.8–2.4)
MCH RBC QN AUTO: 26.4 PG (ref 26.1–32.9)
MCHC RBC AUTO-ENTMCNC: 31.5 G/DL (ref 31.4–35)
MCV RBC AUTO: 84 FL (ref 79.6–97.8)
MONOCYTES # BLD: 0.8 K/UL (ref 0.1–1.3)
MONOCYTES NFR BLD: 9 % (ref 4–12)
NEUTS SEG # BLD: 3.1 K/UL (ref 1.7–8.2)
NEUTS SEG NFR BLD: 36 % (ref 43–78)
NRBC # BLD: 0 K/UL (ref 0–0.2)
P-R INTERVAL, ECG05: 156 MS
PLATELET # BLD AUTO: 249 K/UL (ref 150–450)
PMV BLD AUTO: 11.6 FL (ref 9.4–12.3)
POTASSIUM SERPL-SCNC: 4.3 MMOL/L (ref 3.5–5.1)
PROT SERPL-MCNC: 8.6 G/DL (ref 6.3–8.2)
Q-T INTERVAL, ECG07: 362 MS
QRS DURATION, ECG06: 74 MS
QTC CALCULATION (BEZET), ECG08: 454 MS
RBC # BLD AUTO: 4.69 M/UL (ref 4.05–5.2)
SODIUM SERPL-SCNC: 139 MMOL/L (ref 136–145)
TROPONIN-HIGH SENSITIVITY: 6.5 PG/ML (ref 0–14)
VENTRICULAR RATE, ECG03: 95 BPM
WBC # BLD AUTO: 8.6 K/UL (ref 4.3–11.1)

## 2021-04-13 PROCEDURE — 85025 COMPLETE CBC W/AUTO DIFF WBC: CPT

## 2021-04-13 PROCEDURE — 74011000250 HC RX REV CODE- 250: Performed by: EMERGENCY MEDICINE

## 2021-04-13 PROCEDURE — 74011250636 HC RX REV CODE- 250/636: Performed by: EMERGENCY MEDICINE

## 2021-04-13 PROCEDURE — 99284 EMERGENCY DEPT VISIT MOD MDM: CPT

## 2021-04-13 PROCEDURE — 83735 ASSAY OF MAGNESIUM: CPT

## 2021-04-13 PROCEDURE — 84484 ASSAY OF TROPONIN QUANT: CPT

## 2021-04-13 PROCEDURE — 83690 ASSAY OF LIPASE: CPT

## 2021-04-13 PROCEDURE — 71046 X-RAY EXAM CHEST 2 VIEWS: CPT

## 2021-04-13 PROCEDURE — 80053 COMPREHEN METABOLIC PANEL: CPT

## 2021-04-13 PROCEDURE — 93005 ELECTROCARDIOGRAM TRACING: CPT

## 2021-04-13 PROCEDURE — 96374 THER/PROPH/DIAG INJ IV PUSH: CPT

## 2021-04-13 RX ORDER — LABETALOL HYDROCHLORIDE 5 MG/ML
20 INJECTION, SOLUTION INTRAVENOUS
Status: COMPLETED | OUTPATIENT
Start: 2021-04-13 | End: 2021-04-13

## 2021-04-13 RX ADMIN — SODIUM CHLORIDE 500 ML: 900 INJECTION, SOLUTION INTRAVENOUS at 05:33

## 2021-04-13 RX ADMIN — LABETALOL HYDROCHLORIDE 20 MG: 5 INJECTION INTRAVENOUS at 05:33

## 2021-04-13 NOTE — ED NOTES
I have reviewed discharge instructions with the patient. The patient verbalized understanding. Patient left ED via Discharge Method: ambulatory to Home with spouse. Opportunity for questions and clarification provided. Patient given 0 scripts. To continue your aftercare when you leave the hospital, you may receive an automated call from our care team to check in on how you are doing. This is a free service and part of our promise to provide the best care and service to meet your aftercare needs.  If you have questions, or wish to unsubscribe from this service please call [phone]. Thank you for Choosing our [facility] Emergency Department.

## 2021-04-13 NOTE — ED TRIAGE NOTES
Arrives with face mask in place. Reports rapid heart rate, lightheadedness, nausea, shortness of breath. Onset just pta when attempting to get up to bathroom. Denies chest pain, vomiting, diarrhea. Reports similar episode in past however states resolved. States recently placed on stelara for UC.

## 2021-04-13 NOTE — ED PROVIDER NOTES
Patient with history of ulcerative colitis being treated by GI with injection April 1 and infusion 8 weeks ago. After the infusion had an episode of fast heart rate which resolved. Early this morning around 3 AM had another episode of palpitations with heart rate on the home machine reading 114-121. Patient had some mild shortness of breath. No chest pain. No headache or blurry vision. No nausea numbness or diaphoresis. The history is provided by the patient. No  was used. Rapid Heart Rate  This is a new problem. The current episode started 1 to 2 hours ago. The problem occurs constantly. The problem has been resolved. Associated symptoms include shortness of breath (mild). Pertinent negatives include no chest pain, no abdominal pain and no headaches. Nothing aggravates the symptoms. Nothing relieves the symptoms. She has tried nothing for the symptoms.         Past Medical History:   Diagnosis Date    Abnormal cardiovascular function study     Acute cholecystitis 12/17/2013    Allergic rhinitis 9/27/2012    Allergic rhinitis due to allergen 7/1/2015    Anemia 9/27/2012    Atrial fibrillation (HCC)     blindness to left eye     Chronic anemia 7/1/2015    Chronic kidney disease, stage III (moderate) 9/27/2012    Crohn's disease ([address]     Deep vein thrombosis (DVT) of left lower extremity ([address] 12/9/2016    Diabetes ([address]     DM2 (diabetes mellitus, type 2) ([address] 9/27/2012    Elevated alkaline phosphatase level 5/4/2018    Essential hypertension 7/1/2015    Gastroesophageal reflux disease without esophagitis 7/1/2015    GERD (gastroesophageal reflux disease) 9/27/2012    Heart murmur     HLD (hyperlipidemia) 9/27/2012    HTN (hypertension)     Hypokalemia 9/27/2012    Hypomagnesemia 10/3/2017    Hypothyroidism     Hypothyroidism 9/27/2012    Hypothyroidism due to medication 7/1/2015    Inflammatory bowel disease 7/1/2015    Palpitations     Personal history of asthma-as a child.  8/24/2016    Syncope 12/8/2016    Thrombocytopenia [address] 12/10/2016    Type 2 diabetes mellitus without complication ([facility] 3/4/6111    Ulcerative colitis ([facility]     Uncomplicated asthma 6/79/9045       Past Surgical History:   Procedure Laterality Date    HX CARPAL TUNNEL RELEASE      HX CATARACT REMOVAL      to L    HX HEENT      shunt to L eye r/t glaucoma    HX LAP CHOLECYSTECTOMY      HX ORTHOPAEDIC      to back    HX PARTIAL THYROIDECTOMY           Family History:   Problem Relation Age of Onset    Diabetes Mother     Hypertension Mother     Blindness Mother 80    Psychiatric Disorder Other         Anxiety    Heart Disease Neg Hx     Cancer Neg Hx     Breast Cancer Neg Hx        Social History     Socioeconomic History    Marital status:      Spouse name: Not on file    Number of children: Not on file    Years of education: Not on file    Highest education level: Not on file   Occupational History    Not on file   Social Needs    Financial resource strain: Not on file    Food insecurity     Worry: Not on file     Inability: Not on file    Transportation needs     Medical: Not on file     Non-medical: Not on file   Tobacco Use    Smoking status: Never Smoker    Smokeless tobacco: Never Used   Substance and Sexual Activity    Alcohol use: No    Drug use: No    Sexual activity: Not on file   Lifestyle    Physical activity     Days per week: Not on file     Minutes per session: Not on file    Stress: Not on file   Relationships    Social connections     Talks on phone: Not on file     Gets together: Not on file     Attends [employer]reg[employer] service: Not on file     Active member of club or organization: Not on file     Attends meetings of clubs or organizations: Not on file     Relationship status: Not on file    Intimate partner violence     Fear of current or ex partner: Not on file     Emotionally abused: Not on file     Physically abused: Not on file Forced sexual activity: Not on file   Other Topics Concern    Not on file   Social History Narrative    Not on file         ALLERGIES: Heparin (porcine), Lovenox [enoxaparin], Morphine, and Sulfa (sulfonamide antibiotics)    Review of Systems   Constitutional: Negative for chills and fever. HENT: Negative for rhinorrhea and sore throat. Eyes: Negative for pain and redness. Respiratory: Positive for shortness of breath (mild). Negative for chest tightness and wheezing. Cardiovascular: Positive for palpitations. Negative for chest pain and leg swelling. Gastrointestinal: Negative for abdominal pain, diarrhea, nausea and vomiting. Genitourinary: Negative for dysuria and hematuria. Musculoskeletal: Negative for back pain, gait problem, neck pain and neck stiffness. Skin: Negative for color change and rash. Neurological: Negative for weakness, numbness and headaches. Vitals:    04/13/21 0432   BP: (!) 191/84   Pulse: 90   Resp: 16   Temp: 97.9 °F (36.6 °C)   SpO2: 98%   Weight: 77.1 kg (170 lb)   Height: 5' 1\" (1.549 m)            Physical Exam  Constitutional:       Appearance: Normal appearance. She is well-developed. HENT:      Head: Normocephalic and atraumatic. Neck:      Musculoskeletal: Normal range of motion and neck supple. Cardiovascular:      Rate and Rhythm: Normal rate and regular rhythm. Pulmonary:      Effort: Pulmonary effort is normal. No respiratory distress. Breath sounds: Normal breath sounds. No wheezing. Abdominal:      General: Bowel sounds are normal.      Palpations: Abdomen is soft. Tenderness: There is no abdominal tenderness. Musculoskeletal: Normal range of motion. General: No swelling. Skin:     General: Skin is warm and dry. Neurological:      General: No focal deficit present. Mental Status: She is alert and oriented to person, place, and time.           MDM  Number of Diagnoses or Management Options  Diagnosis management comments: Patient with no acute on blood work or EKG. No recurrence of palpitations. Feeling well here. Will discharge with outpatient follow-up. Amount and/or Complexity of Data Reviewed  Clinical lab tests: ordered and reviewed  Tests in the radiology section of CPT®: ordered and reviewed  Tests in the medicine section of CPT®: ordered and reviewed    Patient Progress  Patient progress: stable         Procedures      EKG: normal sinus rhythm, nonspecific ST and T waves changes. Rate 95. XR CHEST PA LAT (Final result)  Result time 04/13/21 05:30:33  Final result by Riki Muhammad MD (04/13/21 05:30:33)                Impression:    No acute process. Narrative:    EXAM: Chest x-ray. INDICATION: Dyspnea and palpitations. COMPARISON: Prior chest x-ray on December 17, 2020. TECHNIQUE: Frontal and lateral view chest x-ray. FINDINGS: The lungs are clear. The cardiac size, mediastinal contour and   pulmonary vasculature are normal. No pneumothorax or pleural effusion is seen. There are mild degenerative changes in the spine.                   Results Include:    Recent Results (from the past 24 hour(s))   TROPONIN-HIGH SENSITIVITY    Collection Time: 04/13/21  4:40 AM   Result Value Ref Range    Troponin-High Sensitivity 6.5 0 - 14 pg/mL   CBC WITH AUTOMATED DIFF    Collection Time: 04/13/21  4:40 AM   Result Value Ref Range    WBC 8.6 4.3 - 11.1 K/uL    RBC 4.69 4.05 - 5.2 M/uL    HGB 12.4 11.7 - 15.4 g/dL    HCT 39.4 35.8 - 46.3 %    MCV 84.0 79.6 - 97.8 FL    MCH 26.4 26.1 - 32.9 PG    MCHC 31.5 31.4 - 35.0 g/dL    RDW 14.0 11.9 - 14.6 %    PLATELET 365 813 - 576 K/uL    MPV 11.6 9.4 - 12.3 FL    ABSOLUTE NRBC 0.00 0.0 - 0.2 K/uL    DF AUTOMATED      NEUTROPHILS 36 (L) 43 - 78 %    LYMPHOCYTES 52 (H) 13 - 44 %    MONOCYTES 9 4.0 - 12.0 %    EOSINOPHILS 2 0.5 - 7.8 %    BASOPHILS 1 0.0 - 2.0 %    IMMATURE GRANULOCYTES 0 0.0 - 5.0 %    ABS.  NEUTROPHILS 3.1 1.7 - 8.2 K/UL ABS. LYMPHOCYTES 4.5 0.5 - 4.6 K/UL    ABS. MONOCYTES 0.8 0.1 - 1.3 K/UL    ABS. EOSINOPHILS 0.2 0.0 - 0.8 K/UL    ABS. BASOPHILS 0.0 0.0 - 0.2 K/UL    ABS. IMM. GRANS. 0.0 0.0 - 0.5 K/UL   METABOLIC PANEL, COMPREHENSIVE    Collection Time: 04/13/21  4:40 AM   Result Value Ref Range    Sodium 139 136 - 145 mmol/L    Potassium 4.3 3.5 - 5.1 mmol/L    Chloride 108 (H) 98 - 107 mmol/L    CO2 25 21 - 32 mmol/L    Anion gap 6 (L) 7 - 16 mmol/L    Glucose 133 (H) 65 - 100 mg/dL    BUN 14 8 - 23 MG/DL    Creatinine 1.01 (H) 0.6 - 1.0 MG/DL    GFR est AA >60 >60 ml/min/1.73m2    GFR est non-AA 56 (L) >60 ml/min/1.73m2    Calcium 8.9 8.3 - 10.4 MG/DL    Bilirubin, total 0.4 0.2 - 1.1 MG/DL    ALT (SGPT) 17 12 - 65 U/L    AST (SGOT) 38 (H) 15 - 37 U/L    Alk.  phosphatase 97 50 - 136 U/L    Protein, total 8.6 (H) 6.3 - 8.2 g/dL    Albumin 3.5 3.2 - 4.6 g/dL    Globulin 5.1 (H) 2.3 - 3.5 g/dL    A-G Ratio 0.7 (L) 1.2 - 3.5     LIPASE    Collection Time: 04/13/21  4:40 AM   Result Value Ref Range    Lipase 101 73 - 393 U/L   MAGNESIUM    Collection Time: 04/13/21  4:40 AM   Result Value Ref Range    Magnesium 1.8 1.8 - 2.4 mg/dL

## 2021-05-07 PROBLEM — R00.2 PALPITATIONS: Status: ACTIVE | Noted: 2021-05-07

## 2021-06-04 ENCOUNTER — HOSPITAL ENCOUNTER (OUTPATIENT)
Dept: MAMMOGRAPHY | Age: 76
Discharge: HOME | End: 2021-06-04
Attending: NURSE PRACTITIONER
Payer: MEDICARE

## 2021-06-04 DIAGNOSIS — M85.9 LOW BONE DENSITY: ICD-10-CM

## 2021-06-04 DIAGNOSIS — Z13.820 ENCOUNTER FOR OSTEOPOROSIS SCREENING IN ASYMPTOMATIC POSTMENOPAUSAL PATIENT: ICD-10-CM

## 2021-06-04 DIAGNOSIS — Z78.0 ENCOUNTER FOR OSTEOPOROSIS SCREENING IN ASYMPTOMATIC POSTMENOPAUSAL PATIENT: ICD-10-CM

## 2021-06-04 PROCEDURE — 77080 DXA BONE DENSITY AXIAL: CPT

## 2021-06-14 NOTE — PROGRESS NOTES
Her bone density is showing low density, not osteoporosis. She should ensure she is getting enough calcium and vitamin d through diet and/or supplements and engage in weight bearing exercises. If she asks about the diet or supplements, please send her a calcium/vitamin d sheet via mail.
Pt scheduled with Bee Ramirez
Revived results and pt verbalized understanding. Pt stated that she is taking Vitamin D and calcium. Pt also requested that you give her a call and is still having \"nervousness\" on a daily basis. I let her know that I would make a note for you.
See below  Thanks!
3

## 2021-06-21 ENCOUNTER — HOSPITAL ENCOUNTER (OUTPATIENT)
Dept: LAB | Age: 76
Discharge: HOME | End: 2021-06-21
Payer: MEDICARE

## 2021-06-21 PROBLEM — M19.90 ARTHRITIS: Status: ACTIVE | Noted: 2021-06-21

## 2021-06-21 PROBLEM — H54.40 BLIND LEFT EYE: Status: ACTIVE | Noted: 2021-06-21

## 2021-06-21 PROBLEM — H40.53X3: Status: ACTIVE | Noted: 2019-05-21

## 2021-06-21 PROBLEM — H40.9 GLAUCOMA OF LEFT EYE: Status: ACTIVE | Noted: 2021-06-21

## 2021-06-21 LAB
ANION GAP SERPL CALC-SCNC: 5 MMOL/L (ref 7–16)
BUN SERPL-MCNC: 19 MG/DL (ref 8–23)
CALCIUM SERPL-MCNC: 9 MG/DL (ref 8.3–10.4)
CHLORIDE SERPL-SCNC: 106 MMOL/L (ref 98–107)
CO2 SERPL-SCNC: 29 MMOL/L (ref 21–32)
CREAT SERPL-MCNC: 1.1 MG/DL (ref 0.6–1)
GLUCOSE SERPL-MCNC: 107 MG/DL (ref 65–100)
MAGNESIUM SERPL-MCNC: 1.9 MG/DL (ref 1.8–2.4)
POTASSIUM SERPL-SCNC: 3.5 MMOL/L (ref 3.5–5.1)
SODIUM SERPL-SCNC: 140 MMOL/L (ref 136–145)
TSH SERPL DL<=0.005 MIU/L-ACNC: 1.46 UIU/ML (ref 0.36–3.74)

## 2021-06-21 PROCEDURE — 84443 ASSAY THYROID STIM HORMONE: CPT

## 2021-06-21 PROCEDURE — 83735 ASSAY OF MAGNESIUM: CPT

## 2021-06-21 PROCEDURE — 80048 BASIC METABOLIC PNL TOTAL CA: CPT

## 2021-06-21 PROCEDURE — 36415 COLL VENOUS BLD VENIPUNCTURE: CPT

## 2021-08-27 ENCOUNTER — APPOINTMENT (OUTPATIENT)
Dept: GENERAL RADIOLOGY | Age: 76
End: 2021-08-27
Attending: PHYSICIAN ASSISTANT
Payer: MEDICARE

## 2021-08-27 ENCOUNTER — HOSPITAL ENCOUNTER (EMERGENCY)
Age: 76
Discharge: HOME | End: 2021-08-27
Attending: EMERGENCY MEDICINE
Payer: MEDICARE

## 2021-08-27 VITALS
RESPIRATION RATE: 16 BRPM | DIASTOLIC BLOOD PRESSURE: 69 MMHG | OXYGEN SATURATION: 100 % | BODY MASS INDEX: 30.78 KG/M2 | HEART RATE: 63 BPM | WEIGHT: 163 LBS | SYSTOLIC BLOOD PRESSURE: 144 MMHG | TEMPERATURE: 98.1 F | HEIGHT: 61 IN
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
## 2021-08-27 DIAGNOSIS — S46.911A SHOULDER STRAIN, RIGHT, INITIAL ENCOUNTER: ICD-10-CM

## 2021-08-27 PROCEDURE — 73030 X-RAY EXAM OF SHOULDER: CPT

## 2021-08-27 PROCEDURE — 99283 EMERGENCY DEPT VISIT LOW MDM: CPT

## 2021-08-27 NOTE — DISCHARGE INSTRUCTIONS
Rest, ice, elevate, avoid painful activities. ED if worse. Follow up with Ortho for recheck. Do gentle range of motion with your shoulder and follow-up with PCP as well.
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
## 2021-08-27 NOTE — ED NOTES
I have reviewed discharge instructions with the patient. The patient verbalized understanding. Patient left ED via Discharge Method: ambulatory to Home with spouse  Opportunity for questions and clarification provided. Patient given 0 scripts. To continue your aftercare when you leave the hospital, you may receive an automated call from our care team to check in on how you are doing. This is a free service and part of our promise to provide the best care and service to meet your aftercare needs.  If you have questions, or wish to unsubscribe from this service please call [phone]. Thank you for Choosing our [facility] Emergency Department.
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
## 2021-09-24 ENCOUNTER — HOSPITAL ENCOUNTER (OUTPATIENT)
Dept: LAB | Age: 76
Discharge: HOME | End: 2021-09-24
Payer: MEDICARE

## 2021-09-24 ENCOUNTER — HOSPITAL ENCOUNTER (OUTPATIENT)
Dept: LAB | Age: 76
Discharge: HOME | End: 2021-09-24

## 2021-09-24 ENCOUNTER — TRANSCRIBE ORDER (OUTPATIENT)
Dept: REGISTRATION | Age: 76
End: 2021-09-24

## 2021-09-24 DIAGNOSIS — E55.9 VITAMIN D DEFICIENCY, UNSPECIFIED: Primary | ICD-10-CM

## 2021-09-24 DIAGNOSIS — E55.9 VITAMIN D DEFICIENCY, UNSPECIFIED: ICD-10-CM

## 2021-09-24 LAB
25(OH)D3 SERPL-MCNC: 39 NG/ML (ref 30–100)
ANION GAP SERPL CALC-SCNC: 7 MMOL/L (ref 7–16)
BUN SERPL-MCNC: 16 MG/DL (ref 8–23)
CALCIUM SERPL-MCNC: 9.2 MG/DL (ref 8.3–10.4)
CHLORIDE SERPL-SCNC: 104 MMOL/L (ref 98–107)
CO2 SERPL-SCNC: 30 MMOL/L (ref 21–32)
CREAT SERPL-MCNC: 0.93 MG/DL (ref 0.6–1)
ERYTHROCYTE [DISTWIDTH] IN BLOOD BY AUTOMATED COUNT: 13.4 % (ref 11.9–14.6)
GLUCOSE SERPL-MCNC: 132 MG/DL (ref 65–100)
HCT VFR BLD AUTO: 38.8 % (ref 35.8–46.3)
HGB BLD-MCNC: 12 G/DL (ref 11.7–15.4)
MCH RBC QN AUTO: 26.2 PG (ref 26.1–32.9)
MCHC RBC AUTO-ENTMCNC: 30.9 G/DL (ref 31.4–35)
MCV RBC AUTO: 84.7 FL (ref 79.6–97.8)
NRBC # BLD: 0 K/UL (ref 0–0.2)
PLATELET # BLD AUTO: 226 K/UL (ref 150–450)
PMV BLD AUTO: 12.3 FL (ref 9.4–12.3)
POTASSIUM SERPL-SCNC: 3.4 MMOL/L (ref 3.5–5.1)
RBC # BLD AUTO: 4.58 M/UL (ref 4.05–5.2)
SODIUM SERPL-SCNC: 141 MMOL/L (ref 136–145)
WBC # BLD AUTO: 5.7 K/UL (ref 4.3–11.1)

## 2021-09-24 PROCEDURE — 86480 TB TEST CELL IMMUN MEASURE: CPT

## 2021-09-24 PROCEDURE — 36415 COLL VENOUS BLD VENIPUNCTURE: CPT

## 2021-09-24 PROCEDURE — 80048 BASIC METABOLIC PNL TOTAL CA: CPT

## 2021-09-24 PROCEDURE — 87086 URINE CULTURE/COLONY COUNT: CPT

## 2021-09-24 PROCEDURE — 85027 COMPLETE CBC AUTOMATED: CPT

## 2021-09-24 PROCEDURE — 82306 VITAMIN D 25 HYDROXY: CPT

## 2021-09-25 NOTE — PROGRESS NOTES
Left VM to pt her potassium was a little low and to eat some foods with potassium. Also notified her, her urine culture was negative and other labs looked ok. Requested to pt to call the office on Monday to let us know she got the message. Thanks.

## 2021-09-26 LAB
BACTERIA SPEC CULT: NORMAL
M TB IFN-G BLD-IMP: NEGATIVE
QUANTIFERON CRITERIA, QFI1T: NORMAL
QUANTIFERON INCUBATION, QF1T: NORMAL
QUANTIFERON MITOGEN VALUE: >10 IU/ML
QUANTIFERON NIL VALUE: 0.01 IU/ML
QUANTIFERON TB1 AG: 0.04 IU/ML
QUANTIFERON TB2 AG: 0.04 IU/ML
SERVICE CMNT-IMP: NORMAL

## 2021-12-27 ENCOUNTER — HOSPITAL ENCOUNTER (OUTPATIENT)
Dept: LAB | Age: 76
Discharge: HOME | End: 2021-12-27
Payer: MEDICARE

## 2021-12-27 LAB
ALBUMIN SERPL-MCNC: 3.6 G/DL (ref 3.2–4.6)
ALBUMIN/GLOB SERPL: 0.8 {RATIO} (ref 1.2–3.5)
ALP SERPL-CCNC: 66 U/L (ref 50–136)
ALT SERPL-CCNC: 14 U/L (ref 12–65)
ANION GAP SERPL CALC-SCNC: 4 MMOL/L (ref 7–16)
AST SERPL-CCNC: 11 U/L (ref 15–37)
BILIRUB SERPL-MCNC: 0.3 MG/DL (ref 0.2–1.1)
BUN SERPL-MCNC: 16 MG/DL (ref 8–23)
CALCIUM SERPL-MCNC: 8.7 MG/DL (ref 8.3–10.4)
CHLORIDE SERPL-SCNC: 107 MMOL/L (ref 98–107)
CHOLEST SERPL-MCNC: 132 MG/DL
CO2 SERPL-SCNC: 29 MMOL/L (ref 21–32)
CREAT SERPL-MCNC: 1.12 MG/DL (ref 0.6–1)
EST. AVERAGE GLUCOSE BLD GHB EST-MCNC: 154 MG/DL
GLOBULIN SER CALC-MCNC: 4.8 G/DL (ref 2.3–3.5)
GLUCOSE SERPL-MCNC: 142 MG/DL (ref 65–100)
HBA1C MFR BLD: 7 % (ref 4.2–6.3)
HDLC SERPL-MCNC: 48 MG/DL (ref 40–60)
HDLC SERPL: 2.8 {RATIO}
LDLC SERPL CALC-MCNC: 68.8 MG/DL
POTASSIUM SERPL-SCNC: 3.3 MMOL/L (ref 3.5–5.1)
PROT SERPL-MCNC: 8.4 G/DL (ref 6.3–8.2)
SODIUM SERPL-SCNC: 140 MMOL/L (ref 136–145)
TRIGL SERPL-MCNC: 76 MG/DL (ref 35–150)
TSH SERPL DL<=0.005 MIU/L-ACNC: 0.76 UIU/ML (ref 0.36–3.74)
VLDLC SERPL CALC-MCNC: 15.2 MG/DL (ref 6–23)

## 2021-12-27 PROCEDURE — 84443 ASSAY THYROID STIM HORMONE: CPT

## 2021-12-27 PROCEDURE — 80061 LIPID PANEL: CPT

## 2021-12-27 PROCEDURE — 36415 COLL VENOUS BLD VENIPUNCTURE: CPT

## 2021-12-27 PROCEDURE — 83036 HEMOGLOBIN GLYCOSYLATED A1C: CPT

## 2021-12-27 PROCEDURE — 80053 COMPREHEN METABOLIC PANEL: CPT

## 2021-12-28 NOTE — PROGRESS NOTES
A1c is stable at 7. It is preferred her A1c is less than 7. Avoid concentrated sweets, added sugars, high carbohydrate foods, processed foods, and fast foods. Potassium is slightly low. Encourage her to continue to take her potassium supplements as prescribed on December 3 by Dr. Perez Henderson. She also needs to include foods with potassium.

## 2022-03-18 PROBLEM — M25.512 BILATERAL SHOULDER PAIN: Status: ACTIVE | Noted: 2017-03-28

## 2022-03-18 PROBLEM — M25.511 BILATERAL SHOULDER PAIN: Status: ACTIVE | Noted: 2017-03-28

## 2022-03-18 PROBLEM — M85.80 OSTEOPENIA: Status: ACTIVE | Noted: 2017-08-18

## 2022-03-19 PROBLEM — H40.9 GLAUCOMA OF LEFT EYE: Status: ACTIVE | Noted: 2021-06-21

## 2022-03-19 PROBLEM — G89.29 CHRONIC RIGHT-SIDED LOW BACK PAIN WITH SCIATICA: Status: ACTIVE | Noted: 2017-03-28

## 2022-03-19 PROBLEM — R74.8 ELEVATED LIVER ENZYMES: Status: ACTIVE | Noted: 2018-05-04

## 2022-03-19 PROBLEM — M54.40 CHRONIC RIGHT-SIDED LOW BACK PAIN WITH SCIATICA: Status: ACTIVE | Noted: 2017-03-28

## 2022-03-19 PROBLEM — R00.2 PALPITATIONS: Status: ACTIVE | Noted: 2021-05-07

## 2022-03-19 PROBLEM — M19.90 ARTHRITIS: Status: ACTIVE | Noted: 2021-06-21

## 2022-03-19 PROBLEM — K51.018: Status: ACTIVE | Noted: 2017-11-15

## 2022-03-19 PROBLEM — E11.21 TYPE 2 DIABETES MELLITUS WITH NEPHROPATHY (HCC): Status: ACTIVE | Noted: 2018-01-05

## 2022-03-19 PROBLEM — H91.90 HEARING LOSS: Status: ACTIVE | Noted: 2017-02-24

## 2022-03-20 PROBLEM — H54.40 BLIND LEFT EYE: Status: ACTIVE | Noted: 2021-06-21

## 2022-03-20 PROBLEM — K02.9 DENTAL CARIES EXTENDING INTO PULP: Status: ACTIVE | Noted: 2017-02-24

## 2022-03-20 PROBLEM — R53.81 DEBILITY: Status: ACTIVE | Noted: 2017-02-08

## 2022-03-20 PROBLEM — H40.53X3: Status: ACTIVE | Noted: 2019-05-21

## 2022-03-28 ENCOUNTER — HOSPITAL ENCOUNTER (OUTPATIENT)
Dept: GENERAL RADIOLOGY | Age: 77
Discharge: HOME | End: 2022-03-28
Payer: MEDICARE

## 2022-03-28 ENCOUNTER — HOSPITAL ENCOUNTER (OUTPATIENT)
Dept: LAB | Age: 77
Discharge: HOME | End: 2022-03-28
Payer: MEDICARE

## 2022-03-28 DIAGNOSIS — G89.29 CHRONIC PAIN OF LEFT KNEE: ICD-10-CM

## 2022-03-28 DIAGNOSIS — M25.562 CHRONIC PAIN OF LEFT KNEE: ICD-10-CM

## 2022-03-28 LAB
ANION GAP SERPL CALC-SCNC: 7 MMOL/L (ref 7–16)
BUN SERPL-MCNC: 20 MG/DL (ref 8–23)
CALCIUM SERPL-MCNC: 8.9 MG/DL (ref 8.3–10.4)
CHLORIDE SERPL-SCNC: 106 MMOL/L (ref 98–107)
CO2 SERPL-SCNC: 27 MMOL/L (ref 21–32)
CREAT SERPL-MCNC: 1 MG/DL (ref 0.6–1)
EST. AVERAGE GLUCOSE BLD GHB EST-MCNC: 128 MG/DL
GLUCOSE SERPL-MCNC: 164 MG/DL (ref 65–100)
HBA1C MFR BLD: 6.1 % (ref 4.2–6.3)
MAGNESIUM SERPL-MCNC: 1.8 MG/DL (ref 1.8–2.4)
POTASSIUM SERPL-SCNC: 3.5 MMOL/L (ref 3.5–5.1)
SODIUM SERPL-SCNC: 140 MMOL/L (ref 136–145)
TSH SERPL DL<=0.005 MIU/L-ACNC: 0.84 UIU/ML (ref 0.36–3.74)

## 2022-03-28 PROCEDURE — 80048 BASIC METABOLIC PNL TOTAL CA: CPT

## 2022-03-28 PROCEDURE — 83735 ASSAY OF MAGNESIUM: CPT

## 2022-03-28 PROCEDURE — 73560 X-RAY EXAM OF KNEE 1 OR 2: CPT

## 2022-03-28 PROCEDURE — 83036 HEMOGLOBIN GLYCOSYLATED A1C: CPT

## 2022-03-28 PROCEDURE — 36415 COLL VENOUS BLD VENIPUNCTURE: CPT

## 2022-03-28 PROCEDURE — 84443 ASSAY THYROID STIM HORMONE: CPT

## 2022-03-28 NOTE — PROGRESS NOTES
Blood work shows well controlled type 2 diabetes. Normal kidney, liver and thyroid function. Normal magnesium and potassium levels. No further changes to medications needed.

## 2022-03-28 NOTE — PROGRESS NOTES
Please let patient know that xray shows arthritic changes in left knee. I have placed a referral to orthopedics to discuss treatment options.

## 2022-06-20 RX ORDER — MAGNESIUM OXIDE TAB 400 MG (241.3 MG ELEMENTAL MG) 400 (241.3 MG) MG
TAB ORAL
Qty: 90 TABLET | OUTPATIENT
Start: 2022-06-20

## 2022-06-30 ENCOUNTER — OFFICE VISIT (OUTPATIENT)
Dept: FAMILY MEDICINE CLINIC | Facility: CLINIC | Age: 77
End: 2022-06-30
Payer: MEDICARE

## 2022-06-30 VITALS
BODY MASS INDEX: 28.32 KG/M2 | HEIGHT: 61 IN | WEIGHT: 150 LBS | DIASTOLIC BLOOD PRESSURE: 70 MMHG | HEART RATE: 70 BPM | OXYGEN SATURATION: 98 % | SYSTOLIC BLOOD PRESSURE: 130 MMHG

## 2022-06-30 DIAGNOSIS — E03.9 ACQUIRED HYPOTHYROIDISM: ICD-10-CM

## 2022-06-30 DIAGNOSIS — J30.9 ALLERGIC RHINITIS, UNSPECIFIED SEASONALITY, UNSPECIFIED TRIGGER: ICD-10-CM

## 2022-06-30 DIAGNOSIS — E78.2 MIXED HYPERLIPIDEMIA: ICD-10-CM

## 2022-06-30 DIAGNOSIS — E11.21 TYPE 2 DIABETES MELLITUS WITH NEPHROPATHY (HCC): Primary | ICD-10-CM

## 2022-06-30 DIAGNOSIS — E83.42 HYPOMAGNESEMIA: ICD-10-CM

## 2022-06-30 DIAGNOSIS — I10 ESSENTIAL HYPERTENSION: ICD-10-CM

## 2022-06-30 LAB — HBA1C MFR BLD: 6 %

## 2022-06-30 PROCEDURE — 99214 OFFICE O/P EST MOD 30 MIN: CPT | Performed by: FAMILY MEDICINE

## 2022-06-30 PROCEDURE — 83036 HEMOGLOBIN GLYCOSYLATED A1C: CPT | Performed by: FAMILY MEDICINE

## 2022-06-30 PROCEDURE — 1123F ACP DISCUSS/DSCN MKR DOCD: CPT | Performed by: FAMILY MEDICINE

## 2022-06-30 PROCEDURE — 3044F HG A1C LEVEL LT 7.0%: CPT | Performed by: FAMILY MEDICINE

## 2022-06-30 ASSESSMENT — PATIENT HEALTH QUESTIONNAIRE - PHQ9
SUM OF ALL RESPONSES TO PHQ QUESTIONS 1-9: 0
SUM OF ALL RESPONSES TO PHQ QUESTIONS 1-9: 0
1. LITTLE INTEREST OR PLEASURE IN DOING THINGS: 0
SUM OF ALL RESPONSES TO PHQ QUESTIONS 1-9: 0
SUM OF ALL RESPONSES TO PHQ QUESTIONS 1-9: 0
2. FEELING DOWN, DEPRESSED OR HOPELESS: 0
SUM OF ALL RESPONSES TO PHQ9 QUESTIONS 1 & 2: 0

## 2022-06-30 NOTE — PATIENT INSTRUCTIONS
Patient Education        A Healthy Lifestyle: Care Instructions  Your Care Instructions     A healthy lifestyle can help you feel good, stay at a healthy weight, and have plenty of energy for both work and play. A healthy lifestyle is something youcan share with your whole family. A healthy lifestyle also can lower your risk for serious health problems, suchas high blood pressure, heart disease, and diabetes. You can follow a few steps listed below to improve your health and the healthof your family. Follow-up care is a key part of your treatment and safety. Be sure to make and go to all appointments, and call your doctor if you are having problems. It's also a good idea to know your test results and keep alist of the medicines you take. How can you care for yourself at home?  Do not eat too much sugar, fat, or fast foods. You can still have dessert and treats now and then. The goal is moderation.  Start small to improve your eating habits. Pay attention to portion sizes, drink less juice and soda pop, and eat more fruits and vegetables. ? Eat a healthy amount of food. A 3-ounce serving of meat, for example, is about the size of a deck of cards. Fill the rest of your plate with vegetables and whole grains. ? Limit the amount of soda and sports drinks you have every day. Drink more water when you are thirsty. ? Eat plenty of fruits and vegetables every day. Have an apple or some carrot sticks as an afternoon snack instead of a candy bar. Try to have fruits and/or vegetables at every meal.   Make exercise part of your daily routine. You may want to start with simple activities, such as walking, bicycling, or slow swimming. Try to be active 30 to 60 minutes every day. You do not need to do all 30 to 60 minutes all at once. For example, you can exercise 3 times a day for 10 or 20 minutes.  Moderate exercise is safe for most people, but it is always a good idea to talk to your doctor before starting an exercise program.   Keep moving. Nikhil Nail the lawn, work in the garden, or [employer]. Take the stairs instead of the elevator at work.  If you smoke, quit. People who smoke have an increased risk for heart attack, stroke, cancer, and other lung illnesses. Quitting is hard, but there are ways to boost your chance of quitting tobacco for good. ? Use nicotine gum, patches, or lozenges. ? Ask your doctor about stop-smoking programs and medicines. ? Keep trying. In addition to reducing your risk of diseases in the future, you will notice some benefits soon after you stop using tobacco. If you have shortness of breath or asthma symptoms, they will likely get better within a few weeks after you quit.  Limit how much alcohol you drink. Moderate amounts of alcohol (up to 2 drinks a day for men, 1 drink a day for women) are okay. But drinking too much can lead to liver problems, high blood pressure, and other health problems. Family health  If you have a family, there are many things you can do together to improve yourhealth.  Eat meals together as a family as often as possible.  Eat healthy foods. This includes fruits, vegetables, lean meats and dairy, and whole grains.  Include your family in your fitness plan. Most people think of activities such as jogging or tennis as the way to fitness, but there are many ways you and your family can be more active. Anything that makes you breathe hard and gets your heart pumping is exercise. Here are some tips:  ? Walk to do errands or to take your child to school or the bus.  ? Go for a family bike ride after dinner instead of watching TV. Where can you learn more? Go to https://Chunnel.TVcarin.BitMethod. org and sign in to your [employer] account. Enter U531 in the [employer] box to learn more about \"A Healthy Lifestyle: Care Instructions. \"     If you do not have an account, please click on the \"Sign Up Now\" link.   Current as of: February 9,

## 2022-06-30 NOTE — PROGRESS NOTES
Ruben Gallardo is a 64 y.o. female who presents today for the following:  Chief Complaint   Patient presents with    Diabetes       Allergies   Allergen Reactions    Enoxaparin Other (See Comments)     Hospital just told her to never use that again.     Heparin (Porcine) Other (See Comments)     HIT    Morphine Nausea Only     Nausea and stomach pains    Sulfa Antibiotics Nausea And Vomiting       Current Outpatient Medications   Medication Sig Dispense Refill    acetaminophen (TYLENOL) 650 MG extended release tablet Take 650 mg by mouth every 8 hours      albuterol sulfate  (90 Base) MCG/ACT inhaler Inhale 2 puffs into the lungs every 4 hours as needed      amLODIPine (NORVASC) 10 MG tablet TAKE 1 TABLET BY MOUTH DAILY      brimonidine (ALPHAGAN P) 0.1 % SOLN every 8 hours      brimonidine (ALPHAGAN) 0.2 % ophthalmic solution INSTILL 1 DROP IN BOTH EYES THREE TIMES DAILY      famotidine (PEPCID) 40 MG tablet Take 40 mg by mouth daily as needed      fluticasone (FLONASE) 50 MCG/ACT nasal spray 2 sprays by Nasal route daily      levothyroxine (SYNTHROID) 50 MCG tablet TAKE 1 TABLET BY MOUTH DAILY BEFORE BREAKFAST      losartan (COZAAR) 50 MG tablet Take 50 mg by mouth 2 times daily      magnesium oxide (MAG-OX) 400 (240 Mg) MG tablet Take 400 mg by mouth daily      metFORMIN (GLUCOPHAGE) 500 MG tablet Take 500 mg by mouth 2 times daily (with meals)      ondansetron (ZOFRAN-ODT) 4 MG disintegrating tablet Place 4 mg under the tongue 3 times daily as needed      polyethyl glycol-propyl glycol 0.4-0.3 % (SYSTANE) 0.4-0.3 % ophthalmic solution as needed      potassium chloride (KLOR-CON M) 20 MEQ extended release tablet TAKE 1 TABLET BY MOUTH TWICE DAILY      rosuvastatin (CRESTOR) 10 MG tablet TAKE 1 TABLET BY MOUTH EVERY NIGHT      simethicone (MYLICON) 80 MG chewable tablet Take 125 mg by mouth as needed      triamcinolone (KENALOG) 0.025 % ointment Apply topically 2 times daily      ustekinumab (STELARA) 130 MG/26ML SOLN Infuse intravenously       No current facility-administered medications for this visit.        Past Medical History:   Diagnosis Date    Abnormal cardiovascular function study     Acute cholecystitis 12/17/2013    Allergic rhinitis 9/27/2012    Allergic rhinitis due to allergen 7/1/2015    Anemia 9/27/2012    Atrial fibrillation (HCC)     Blindness of right eye     Chronic anemia 7/1/2015    Chronic kidney disease, stage III (moderate) ([address] 9/27/2012    Crohn's disease ([address]     Deep vein thrombosis (DVT) of left lower extremity ([address] 12/9/2016    Diabetes ([address]     DM2 (diabetes mellitus, type 2) ([address] 9/27/2012    Elevated alkaline phosphatase level 5/4/2018    Essential hypertension 7/1/2015    Gastroesophageal reflux disease without esophagitis 7/1/2015    GERD (gastroesophageal reflux disease) 9/27/2012    Glaucoma     left eye blind    Heart murmur     HLD (hyperlipidemia) 9/27/2012    HTN (hypertension)     Hypokalemia 9/27/2012    Hypomagnesemia 10/3/2017    Hypothyroidism     Hypothyroidism 9/27/2012    Hypothyroidism due to medication 7/1/2015    Inflammatory bowel disease 7/1/2015    Palpitations     Personal history of asthma 8/24/2016    Syncope 12/8/2016    Thrombocytopenia ([address] 12/10/2016    Type 2 diabetes mellitus without complication ([address] 5/9/9911    Ulcerative colitis ([address]     Uncomplicated asthma 8/94/7560       Past Surgical History:   Procedure Laterality Date    CARPAL TUNNEL RELEASE      CATARACT REMOVAL      to L    CHOLECYSTECTOMY, LAPAROSCOPIC      HEENT      shunt to L eye r/t glaucoma    ORTHOPEDIC SURGERY      to back    THYROIDECTOMY, PARTIAL         Social History     Tobacco Use    Smoking status: Never Smoker    Smokeless tobacco: Never Used   Substance Use Topics    Alcohol use: No        Family History   Problem Relation Age of Onset    Heart Disease Neg Hx     Psychiatric Disorder Other

## 2022-07-01 RX ORDER — LEVOTHYROXINE SODIUM 0.05 MG/1
50 TABLET ORAL DAILY
Qty: 90 TABLET | Refills: 2 | Status: SHIPPED | OUTPATIENT
Start: 2022-07-01 | End: 2022-09-30 | Stop reason: SDUPTHER

## 2022-07-01 RX ORDER — AMLODIPINE BESYLATE 10 MG/1
10 TABLET ORAL DAILY
Qty: 90 TABLET | Refills: 2 | Status: SHIPPED | OUTPATIENT
Start: 2022-07-01 | End: 2022-09-30 | Stop reason: SDUPTHER

## 2022-07-01 RX ORDER — LANOLIN ALCOHOL/MO/W.PET/CERES
400 CREAM (GRAM) TOPICAL DAILY
Qty: 90 TABLET | Refills: 2 | Status: SHIPPED | OUTPATIENT
Start: 2022-07-01 | End: 2022-09-30 | Stop reason: SDUPTHER

## 2022-07-01 RX ORDER — FLUTICASONE PROPIONATE 50 MCG
2 SPRAY, SUSPENSION (ML) NASAL DAILY
Qty: 16 G | Refills: 5 | Status: SHIPPED | OUTPATIENT
Start: 2022-07-01

## 2022-07-01 RX ORDER — LOSARTAN POTASSIUM 50 MG/1
50 TABLET ORAL 2 TIMES DAILY
Qty: 90 TABLET | Refills: 2 | Status: SHIPPED | OUTPATIENT
Start: 2022-07-01 | End: 2022-09-30 | Stop reason: SDUPTHER

## 2022-07-01 RX ORDER — ROSUVASTATIN CALCIUM 10 MG/1
10 TABLET, COATED ORAL DAILY
Qty: 90 TABLET | Refills: 2 | Status: SHIPPED | OUTPATIENT
Start: 2022-07-01 | End: 2022-09-30 | Stop reason: SDUPTHER

## 2022-07-01 RX ORDER — POTASSIUM CHLORIDE 20 MEQ/1
10 TABLET, EXTENDED RELEASE ORAL 2 TIMES DAILY
Qty: 180 TABLET | Refills: 2 | Status: SHIPPED | OUTPATIENT
Start: 2022-07-01 | End: 2022-09-30 | Stop reason: SDUPTHER

## 2022-07-01 ASSESSMENT — ENCOUNTER SYMPTOMS
SHORTNESS OF BREATH: 0
CHEST TIGHTNESS: 0

## 2022-07-31 ENCOUNTER — APPOINTMENT (OUTPATIENT)
Dept: GENERAL RADIOLOGY | Age: 77
End: 2022-07-31
Payer: OTHER MISCELLANEOUS

## 2022-07-31 ENCOUNTER — HOSPITAL ENCOUNTER (EMERGENCY)
Age: 77
Discharge: HOME | End: 2022-07-31
Attending: EMERGENCY MEDICINE
Payer: OTHER MISCELLANEOUS

## 2022-07-31 VITALS
DIASTOLIC BLOOD PRESSURE: 78 MMHG | SYSTOLIC BLOOD PRESSURE: 154 MMHG | OXYGEN SATURATION: 99 % | TEMPERATURE: 98.5 F | RESPIRATION RATE: 18 BRPM | HEART RATE: 83 BPM

## 2022-07-31 DIAGNOSIS — S90.31XA CONTUSION OF RIGHT FOOT, INITIAL ENCOUNTER: Primary | ICD-10-CM

## 2022-07-31 PROCEDURE — 99283 EMERGENCY DEPT VISIT LOW MDM: CPT

## 2022-07-31 PROCEDURE — 73630 X-RAY EXAM OF FOOT: CPT

## 2022-07-31 ASSESSMENT — ENCOUNTER SYMPTOMS
ABDOMINAL PAIN: 0
RESPIRATORY NEGATIVE: 1
ALLERGIC/IMMUNOLOGIC NEGATIVE: 1
EYES NEGATIVE: 1
GASTROINTESTINAL NEGATIVE: 1
SHORTNESS OF BREATH: 0

## 2022-07-31 ASSESSMENT — PAIN - FUNCTIONAL ASSESSMENT: PAIN_FUNCTIONAL_ASSESSMENT: 0-10

## 2022-07-31 ASSESSMENT — PAIN SCALES - GENERAL: PAINLEVEL_OUTOF10: 4

## 2022-07-31 NOTE — DISCHARGE INSTRUCTIONS
Rest, ice, elevate, avoid painful activities. ED if worse. Follow up with Ortho for recheck. Use post op shoe for comfort.

## 2022-07-31 NOTE — ED NOTES
I have reviewed discharge instructions with the patient and spouse. The patient and spouse verbalized understanding. Patient left ED via Discharge Method: wheelchair to Home with spouse. Opportunity for questions and clarification provided. Patient given 0 scripts. No esign        To continue your aftercare when you leave the hospital, you may receive an automated call from our care team to check in on how you are doing. This is a free service and part of our promise to provide the best care and service to meet your aftercare needs.  If you have questions, or wish to unsubscribe from this service please call [phone]. Thank you for Choosing our [facility] Emergency Department.         Karina Ridley RN  07/31/22 0368

## 2022-07-31 NOTE — ED PROVIDER NOTES
[employer] Emergency Department Provider Note                   PCP:                Lori Arreguin MD               Age: 64 y.o. Sex: female       ICD-10-CM    1. Contusion of right foot, initial encounter  S90.31XA           DISPOSITION Decision To Discharge 07/31/2022 02:21:17 PM        [facility]     Amount and/or Complexity of Data Reviewed  Tests in the radiology section of CPT®: ordered and reviewed    Risk of Complications, Morbidity, and/or Mortality  Presenting problems: moderate  Diagnostic procedures: moderate  Management options: moderate  General comments: Patient's x-ray is reassuring today. I will make a referral to orthopedics for further evaluation. Symptomatic treatment. Rest, ice, elevate, avoid painful activities. ED if worse. Follow up with Ortho for recheck. We have placed a postop shoe here for comfort to help support the foot. Patient is stable for discharge at this time. Orders Placed This Encounter   Procedures    XR FOOT RIGHT (MIN 3 VIEWS)    Post-op shoe        Xander Vieyra is a 64 y.o. female who presents to the Emergency Department with chief complaint of    Chief Complaint   Patient presents with    Foot Pain      Patient is here with right foot pain over the metatarsal phalangeal area. She was standing at the ice machine at [facility] when the cabinet door fell on the shoe. This happened prior to arrival.  There are no open wounds. She states it is not very painful while she is just sitting there but when she stands up and tries to ambulate it does hurt. There are no other injuries or complaints. The history is provided by the patient. Foot Pain  This is a new problem. The current episode started less than 1 hour ago. The problem occurs constantly. The problem has not changed since onset. Pertinent negatives include no chest pain, no abdominal pain, no headaches and no shortness of breath. The symptoms are aggravated by walking and standing.  Nothing relieves the symptoms. She has tried nothing for the symptoms. Review of Systems   Constitutional: Negative. Negative for fever. HENT: Negative. Eyes: Negative. Respiratory: Negative. Negative for shortness of breath. Cardiovascular: Negative. Negative for chest pain. Gastrointestinal: Negative. Negative for abdominal pain. Endocrine: Negative. Genitourinary: Negative. Musculoskeletal: Negative. Right foot pain   Skin: Negative. Allergic/Immunologic: Negative. Neurological: Negative. Negative for headaches. Hematological: Negative. Psychiatric/Behavioral: Negative. All other systems reviewed and are negative.     Past Medical History:   Diagnosis Date    Abnormal cardiovascular function study     Acute cholecystitis 12/17/2013    Allergic rhinitis 9/27/2012    Allergic rhinitis due to allergen 7/1/2015    Anemia 9/27/2012    Atrial fibrillation (HCC)     Blindness of right eye     Chronic anemia 7/1/2015    Chronic kidney disease, stage III (moderate) ([address] 9/27/2012    Crohn's disease ([address]     Deep vein thrombosis (DVT) of left lower extremity ([address] 12/9/2016    Diabetes ([address]     DM2 (diabetes mellitus, type 2) ([address] 9/27/2012    Elevated alkaline phosphatase level 5/4/2018    Essential hypertension 7/1/2015    Gastroesophageal reflux disease without esophagitis 7/1/2015    GERD (gastroesophageal reflux disease) 9/27/2012    Glaucoma     left eye blind    Heart murmur     HLD (hyperlipidemia) 9/27/2012    HTN (hypertension)     Hypokalemia 9/27/2012    Hypomagnesemia 10/3/2017    Hypothyroidism     Hypothyroidism 9/27/2012    Hypothyroidism due to medication 7/1/2015    Inflammatory bowel disease 7/1/2015    Palpitations     Personal history of asthma 8/24/2016    Syncope 12/8/2016    Thrombocytopenia ([address] 12/10/2016    Type 2 diabetes mellitus without complication ([address] 2/6/8961    Ulcerative colitis ([address]     Uncomplicated asthma 5/47/8101        Past Surgical History:   Procedure Laterality Date    CARPAL TUNNEL RELEASE      CATARACT REMOVAL      to L    CHOLECYSTECTOMY, LAPAROSCOPIC      HEENT      shunt to L eye r/t glaucoma    ORTHOPEDIC SURGERY      to back    THYROIDECTOMY, PARTIAL          Family History   Problem Relation Age of Onset    Heart Disease Neg Hx     Psychiatric Disorder Other         Anxiety    Blindness Mother 80    Hypertension Mother     Diabetes Mother     Cancer Neg Hx     Breast Cancer Neg Hx         Social History     Socioeconomic History    Marital status:    Tobacco Use    Smoking status: Never    Smokeless tobacco: Never   Substance and Sexual Activity    Alcohol use: No    Drug use: No         Enoxaparin, Heparin (porcine), Morphine, and Sulfa antibiotics     Previous Medications    ACETAMINOPHEN (TYLENOL) 650 MG EXTENDED RELEASE TABLET    Take 650 mg by mouth every 8 hours    ALBUTEROL SULFATE  (90 BASE) MCG/ACT INHALER    Inhale 2 puffs into the lungs every 4 hours as needed    AMLODIPINE (NORVASC) 10 MG TABLET    Take 1 tablet by mouth daily TAKE 1 TABLET BY MOUTH DAILY    BRIMONIDINE (ALPHAGAN P) 0.1 % SOLN    every 8 hours    BRIMONIDINE (ALPHAGAN) 0.2 % OPHTHALMIC SOLUTION    INSTILL 1 DROP IN BOTH EYES THREE TIMES DAILY    FAMOTIDINE (PEPCID) 40 MG TABLET    Take 40 mg by mouth daily as needed    FLUTICASONE (FLONASE) 50 MCG/ACT NASAL SPRAY    2 sprays by Nasal route daily    LEVOTHYROXINE (SYNTHROID) 50 MCG TABLET    Take 1 tablet by mouth Daily TAKE 1 TABLET BY MOUTH DAILY BEFORE BREAKFAST    LOSARTAN (COZAAR) 50 MG TABLET    Take 1 tablet by mouth 2 times daily    MAGNESIUM OXIDE (MAG-OX) 400 (240 MG) MG TABLET    Take 1 tablet by mouth daily    METFORMIN (GLUCOPHAGE) 500 MG TABLET    Take 1 tablet by mouth 2 times daily (with meals)    ONDANSETRON (ZOFRAN-ODT) 4 MG DISINTEGRATING TABLET    Place 4 mg under the tongue 3 times daily as needed    POLYETHYL GLYCOL-PROPYL GLYCOL 0.4-0.3 % (SYSTANE) 0.4-0.3 % OPHTHALMIC SOLUTION    as needed    POTASSIUM CHLORIDE (KLOR-CON M) 20 MEQ EXTENDED RELEASE TABLET    Take 0.5 tablets by mouth 2 times daily TAKE 1 TABLET BY MOUTH TWICE DAILY    ROSUVASTATIN (CRESTOR) 10 MG TABLET    Take 1 tablet by mouth daily TAKE 1 TABLET BY MOUTH EVERY NIGHT    SIMETHICONE (MYLICON) 80 MG CHEWABLE TABLET    Take 125 mg by mouth as needed    TRIAMCINOLONE (KENALOG) 0.025 % OINTMENT    Apply topically 2 times daily    USTEKINUMAB (STELARA) 130 MG/26ML SOLN    Infuse intravenously        Vitals signs and nursing note reviewed. Patient Vitals for the past 4 hrs:   Temp Pulse Resp BP SpO2   07/31/22 1354 98.5 °F (36.9 °C) 83 18 (!) 154/78 99 %          Physical Exam  Vitals and nursing note reviewed. Exam conducted with a chaperone present (Right foot pain). Constitutional:       Appearance: Normal appearance. HENT:      Head: Normocephalic and atraumatic. Right Ear: External ear normal.      Left Ear: External ear normal.      Nose: Nose normal.      Mouth/Throat:      Mouth: Mucous membranes are moist.   Eyes:      Extraocular Movements: Extraocular movements intact. Conjunctiva/sclera: Conjunctivae normal.      Pupils: Pupils are equal, round, and reactive to light. Cardiovascular:      Rate and Rhythm: Normal rate. Pulses: Normal pulses. Pulmonary:      Effort: Pulmonary effort is normal.   Abdominal:      General: Abdomen is flat. Musculoskeletal:         General: Tenderness and signs of injury present. No swelling or deformity. Normal range of motion. Cervical back: Normal range of motion. Right lower leg: No edema. Left lower leg: No edema. Skin:     General: Skin is warm. Capillary Refill: Capillary refill takes less than 2 seconds. Neurological:      General: No focal deficit present. Mental Status: She is alert and oriented to person, place, and time. Mental status is at baseline.    Psychiatric:         Mood and Affect: Mood normal. Behavior: Behavior normal.         Thought Content: Thought content normal.         Judgment: Judgment normal.        Procedures      Labs Reviewed - No data to display     XR FOOT RIGHT (MIN 3 VIEWS)   Final Result   Multiple hammertoe deformities with no definite acute bony   abnormality identified. Voice dictation software was used during the making of this note. This software is not perfect and grammatical and other typographical errors may be present. This note has not been completely proofread for errors.      WENCESLAO Phan  07/31/22 [id number]

## 2022-08-12 ENCOUNTER — TELEPHONE (OUTPATIENT)
Dept: FAMILY MEDICINE CLINIC | Facility: CLINIC | Age: 77
End: 2022-08-12

## 2022-08-12 NOTE — TELEPHONE ENCOUNTER
----- Message from Francisca Melton sent at 8/12/2022  2:41 PM EDT -----  Subject: Message to Provider    QUESTIONS  Information for Provider? Pt called in, she would like a nurse to call her   back as she has questions about the flu shot and the 4th Covid booster. Call pt to answer questions and advise.  ---------------------------------------------------------------------------  --------------  Eileen GUZMAN  [phone]; OK to leave message on voicemail  ---------------------------------------------------------------------------  --------------  SCRIPT ANSWERS  Relationship to Patient?  Self

## 2022-09-30 ENCOUNTER — OFFICE VISIT (OUTPATIENT)
Dept: FAMILY MEDICINE CLINIC | Facility: CLINIC | Age: 77
End: 2022-09-30
Payer: MEDICARE

## 2022-09-30 VITALS
HEART RATE: 70 BPM | BODY MASS INDEX: 28.51 KG/M2 | DIASTOLIC BLOOD PRESSURE: 60 MMHG | SYSTOLIC BLOOD PRESSURE: 112 MMHG | HEIGHT: 61 IN | OXYGEN SATURATION: 99 % | WEIGHT: 151 LBS

## 2022-09-30 DIAGNOSIS — E55.9 VITAMIN D DEFICIENCY: ICD-10-CM

## 2022-09-30 DIAGNOSIS — I10 ESSENTIAL HYPERTENSION: ICD-10-CM

## 2022-09-30 DIAGNOSIS — E78.2 MIXED HYPERLIPIDEMIA: ICD-10-CM

## 2022-09-30 DIAGNOSIS — E83.42 HYPOMAGNESEMIA: ICD-10-CM

## 2022-09-30 DIAGNOSIS — E03.9 ACQUIRED HYPOTHYROIDISM: ICD-10-CM

## 2022-09-30 DIAGNOSIS — K51.00 ULCERATIVE PANCOLITIS WITHOUT COMPLICATION (HCC): ICD-10-CM

## 2022-09-30 DIAGNOSIS — E11.21 TYPE 2 DIABETES MELLITUS WITH NEPHROPATHY (HCC): Primary | ICD-10-CM

## 2022-09-30 LAB
25(OH)D3 SERPL-MCNC: 14.4 NG/ML (ref 30–100)
ALBUMIN SERPL-MCNC: 3.4 G/DL (ref 3.2–4.6)
ALBUMIN/GLOB SERPL: 0.8 {RATIO} (ref 1.2–3.5)
ALP SERPL-CCNC: 68 U/L (ref 50–136)
ALT SERPL-CCNC: 17 U/L (ref 12–65)
ANION GAP SERPL CALC-SCNC: 2 MMOL/L (ref 4–13)
AST SERPL-CCNC: 14 U/L (ref 15–37)
BASOPHILS # BLD: 0 K/UL (ref 0–0.2)
BASOPHILS NFR BLD: 1 % (ref 0–2)
BILIRUB SERPL-MCNC: 0.2 MG/DL (ref 0.2–1.1)
BUN SERPL-MCNC: 16 MG/DL (ref 8–23)
CALCIUM SERPL-MCNC: 8.7 MG/DL (ref 8.3–10.4)
CHLORIDE SERPL-SCNC: 109 MMOL/L (ref 101–110)
CHOLEST SERPL-MCNC: 147 MG/DL
CO2 SERPL-SCNC: 27 MMOL/L (ref 21–32)
CREAT SERPL-MCNC: 1 MG/DL (ref 0.6–1)
CREAT UR-MCNC: 62 MG/DL
DIFFERENTIAL METHOD BLD: ABNORMAL
EOSINOPHIL # BLD: 0.1 K/UL (ref 0–0.8)
EOSINOPHIL NFR BLD: 3 % (ref 0.5–7.8)
ERYTHROCYTE [DISTWIDTH] IN BLOOD BY AUTOMATED COUNT: 13.6 % (ref 11.9–14.6)
GLOBULIN SER CALC-MCNC: 4.4 G/DL (ref 2.3–3.5)
GLUCOSE SERPL-MCNC: 137 MG/DL (ref 65–100)
HBA1C MFR BLD: 6.1 %
HCT VFR BLD AUTO: 39.8 % (ref 35.8–46.3)
HDLC SERPL-MCNC: 50 MG/DL (ref 40–60)
HDLC SERPL: 2.9 {RATIO}
HGB BLD-MCNC: 12.3 G/DL (ref 11.7–15.4)
IMM GRANULOCYTES # BLD AUTO: 0 K/UL (ref 0–0.5)
IMM GRANULOCYTES NFR BLD AUTO: 0 % (ref 0–5)
LDLC SERPL CALC-MCNC: 81.6 MG/DL
LYMPHOCYTES # BLD: 2.3 K/UL (ref 0.5–4.6)
LYMPHOCYTES NFR BLD: 42 % (ref 13–44)
MAGNESIUM SERPL-MCNC: 2.2 MG/DL (ref 1.8–2.4)
MCH RBC QN AUTO: 27.4 PG (ref 26.1–32.9)
MCHC RBC AUTO-ENTMCNC: 30.9 G/DL (ref 31.4–35)
MCV RBC AUTO: 88.6 FL (ref 79.6–97.8)
MICROALBUMIN UR-MCNC: 2.86 MG/DL
MICROALBUMIN/CREAT UR-RTO: 46 MG/G
MONOCYTES # BLD: 0.5 K/UL (ref 0.1–1.3)
MONOCYTES NFR BLD: 9 % (ref 4–12)
NEUTS SEG # BLD: 2.5 K/UL (ref 1.7–8.2)
NEUTS SEG NFR BLD: 45 % (ref 43–78)
NRBC # BLD: 0 K/UL (ref 0–0.2)
PLATELET # BLD AUTO: 194 K/UL (ref 150–450)
PMV BLD AUTO: 12.4 FL (ref 9.4–12.3)
POTASSIUM SERPL-SCNC: 3.7 MMOL/L (ref 3.5–5.1)
PROT SERPL-MCNC: 7.8 G/DL (ref 6.3–8.2)
RBC # BLD AUTO: 4.49 M/UL (ref 4.05–5.2)
SODIUM SERPL-SCNC: 138 MMOL/L (ref 136–145)
TRIGL SERPL-MCNC: 77 MG/DL (ref 35–150)
TSH, 3RD GENERATION: 1.14 UIU/ML (ref 0.36–3.74)
VLDLC SERPL CALC-MCNC: 15.4 MG/DL (ref 6–23)
WBC # BLD AUTO: 5.5 K/UL (ref 4.3–11.1)

## 2022-09-30 PROCEDURE — 1123F ACP DISCUSS/DSCN MKR DOCD: CPT | Performed by: FAMILY MEDICINE

## 2022-09-30 PROCEDURE — 83036 HEMOGLOBIN GLYCOSYLATED A1C: CPT | Performed by: FAMILY MEDICINE

## 2022-09-30 PROCEDURE — 3044F HG A1C LEVEL LT 7.0%: CPT | Performed by: FAMILY MEDICINE

## 2022-09-30 PROCEDURE — 99214 OFFICE O/P EST MOD 30 MIN: CPT | Performed by: FAMILY MEDICINE

## 2022-09-30 RX ORDER — GLIMEPIRIDE 2 MG/1
TABLET ORAL
COMMUNITY
Start: 2022-09-01

## 2022-09-30 RX ORDER — ROSUVASTATIN CALCIUM 10 MG/1
10 TABLET, COATED ORAL DAILY
Qty: 90 TABLET | Refills: 2 | Status: SHIPPED | OUTPATIENT
Start: 2022-09-30

## 2022-09-30 RX ORDER — LEVOTHYROXINE SODIUM 0.05 MG/1
50 TABLET ORAL DAILY
Qty: 90 TABLET | Refills: 2 | Status: SHIPPED | OUTPATIENT
Start: 2022-09-30

## 2022-09-30 RX ORDER — POTASSIUM CHLORIDE 20 MEQ/1
10 TABLET, EXTENDED RELEASE ORAL 2 TIMES DAILY
Qty: 180 TABLET | Refills: 2 | Status: SHIPPED | OUTPATIENT
Start: 2022-09-30

## 2022-09-30 RX ORDER — AMLODIPINE BESYLATE 10 MG/1
10 TABLET ORAL DAILY
Qty: 90 TABLET | Refills: 2 | Status: SHIPPED | OUTPATIENT
Start: 2022-09-30

## 2022-09-30 RX ORDER — LOSARTAN POTASSIUM 50 MG/1
50 TABLET ORAL 2 TIMES DAILY
Qty: 90 TABLET | Refills: 2 | Status: SHIPPED | OUTPATIENT
Start: 2022-09-30

## 2022-09-30 RX ORDER — LANOLIN ALCOHOL/MO/W.PET/CERES
400 CREAM (GRAM) TOPICAL DAILY
Qty: 90 TABLET | Refills: 2 | Status: SHIPPED | OUTPATIENT
Start: 2022-09-30

## 2022-09-30 ASSESSMENT — PATIENT HEALTH QUESTIONNAIRE - PHQ9
SUM OF ALL RESPONSES TO PHQ QUESTIONS 1-9: 0
SUM OF ALL RESPONSES TO PHQ9 QUESTIONS 1 & 2: 0
SUM OF ALL RESPONSES TO PHQ QUESTIONS 1-9: 0
1. LITTLE INTEREST OR PLEASURE IN DOING THINGS: 0
SUM OF ALL RESPONSES TO PHQ QUESTIONS 1-9: 0
2. FEELING DOWN, DEPRESSED OR HOPELESS: 0
SUM OF ALL RESPONSES TO PHQ QUESTIONS 1-9: 0

## 2022-09-30 ASSESSMENT — ENCOUNTER SYMPTOMS
WHEEZING: 0
CHEST TIGHTNESS: 0
SHORTNESS OF BREATH: 0

## 2022-09-30 NOTE — PROGRESS NOTES
Eleazar Radford is a 76 y.o. female who presents today for the following:  Chief Complaint   Patient presents with    Diabetes       Allergies   Allergen Reactions    Enoxaparin Other (See Comments)     Hospital just told her to never use that again.     Heparin (Porcine) Other (See Comments)     HIT    Morphine Nausea Only     Nausea and stomach pains    Sulfa Antibiotics Nausea And Vomiting       Current Outpatient Medications   Medication Sig Dispense Refill    timolol (TIMOPTIC) 0.25 % ophthalmic solution INSTILL 1 DROP IN BOTH EYES TWICE DAILY      rosuvastatin (CRESTOR) 10 MG tablet Take 1 tablet by mouth daily TAKE 1 TABLET BY MOUTH EVERY NIGHT 90 tablet 2    potassium chloride (KLOR-CON M) 20 MEQ extended release tablet Take 0.5 tablets by mouth 2 times daily TAKE 1 TABLET BY MOUTH TWICE DAILY 180 tablet 2    levothyroxine (SYNTHROID) 50 MCG tablet Take 1 tablet by mouth Daily TAKE 1 TABLET BY MOUTH DAILY BEFORE BREAKFAST 90 tablet 2    losartan (COZAAR) 50 MG tablet Take 1 tablet by mouth 2 times daily 90 tablet 2    fluticasone (FLONASE) 50 MCG/ACT nasal spray 2 sprays by Nasal route daily 16 g 5    amLODIPine (NORVASC) 10 MG tablet Take 1 tablet by mouth daily TAKE 1 TABLET BY MOUTH DAILY 90 tablet 2    magnesium oxide (MAG-OX) 400 (240 Mg) MG tablet Take 1 tablet by mouth daily 90 tablet 2    metFORMIN (GLUCOPHAGE) 500 MG tablet Take 1 tablet by mouth 2 times daily (with meals) 180 tablet 2    acetaminophen (TYLENOL) 650 MG extended release tablet Take 650 mg by mouth every 8 hours      albuterol sulfate  (90 Base) MCG/ACT inhaler Inhale 2 puffs into the lungs every 4 hours as needed      brimonidine (ALPHAGAN P) 0.1 % SOLN every 8 hours      brimonidine (ALPHAGAN) 0.2 % ophthalmic solution INSTILL 1 DROP IN BOTH EYES THREE TIMES DAILY      famotidine (PEPCID) 40 MG tablet Take 40 mg by mouth daily as needed      ondansetron (ZOFRAN-ODT) 4 MG disintegrating tablet Place 4 mg under the tongue 3 Tobacco Use    Smoking status: Never    Smokeless tobacco: Never   Substance Use Topics    Alcohol use: No        Family History   Problem Relation Age of Onset    Heart Disease Neg Hx     Psychiatric Disorder Other         Anxiety    Blindness Mother 80    Hypertension Mother     Diabetes Mother     Cancer Neg Hx     Breast Cancer Neg Hx        Patient is a 66-year-old female here today for routine follow-up. Patient has a history of ulcerative colitis, type 2 diabetes complicated by nephropathy, hypertension, high cholesterol, hypothyroidism, and asthma. Diabetes was found to be well controlled with metformin. Reports sugars typically around 150. She continues to struggle with ulcerative colitis. She is established with GI at [facility]. She is interested in switching to GI provider at [address] [place] [address]. Review of Systems   Constitutional:  Negative for fatigue and unexpected weight change. Respiratory:  Negative for chest tightness, shortness of breath and wheezing. Cardiovascular:  Negative for chest pain, palpitations and leg swelling. Neurological:  Negative for dizziness and numbness. /60   Pulse 70   Ht 5' 1\" (1.549 m)   Wt 151 lb (68.5 kg)   SpO2 99%   BMI 28.53 kg/m²     Physical Exam  Vitals reviewed. Constitutional:       General: She is not in acute distress. Appearance: Normal appearance. She is not ill-appearing. Eyes:      General: No scleral icterus. Conjunctiva/sclera: Conjunctivae normal.   Neck:      Vascular: No carotid bruit. Cardiovascular:      Rate and Rhythm: Normal rate and regular rhythm. Heart sounds: Normal heart sounds. No murmur heard. Pulmonary:      Effort: Pulmonary effort is normal. No respiratory distress. Breath sounds: Normal breath sounds. No wheezing. Musculoskeletal:      Cervical back: Neck supple. Right lower leg: No edema. Left lower leg: No edema. Skin:     General: Skin is warm and dry. Neurological:      General: No focal deficit present. Mental Status: She is oriented to person, place, and time. Psychiatric:         Mood and Affect: Mood normal.         Behavior: Behavior normal.        Hemoglobin A1C, POC   Date Value Ref Range Status   09/30/2022 6.1 % Final   06/30/2022 6.0 % Final   09/21/2021 6.4 % Final         1. Type 2 diabetes mellitus with nephropathy (HCC)  Well controlled. No changes made today. -     AMB POC HEMOGLOBIN A1C  -     Comprehensive Metabolic Panel; Future  -     CBC with Auto Differential; Future  -     Lipid Panel; Future  -     TSH; Future  -     Microalbumin / Creatinine Urine Ratio; Future  -     metFORMIN (GLUCOPHAGE) 500 MG tablet; Take 1 tablet by mouth 2 times daily (with meals), Disp-180 tablet, R-2Normal  2. Ulcerative pancolitis without complication ([address]  Will make referral to [facility] gastroenterology per patient request.  Office is more convenient for patient. Currently on Stelara. She feels medication is helping but continues to be symptomatic.    -     Vitamin D 25 Hydroxy; Future  -     [facility]  3. Essential hypertension  Blood pressure is at goal for age. Encouraged continued medication compliance, DASH or Mediterranean diet and daily physical activity.   -     Magnesium; Future  -     potassium chloride (KLOR-CON M) 20 MEQ extended release tablet; Take 0.5 tablets by mouth 2 times daily TAKE 1 TABLET BY MOUTH TWICE DAILY, Disp-180 tablet, R-2Normal  -     losartan (COZAAR) 50 MG tablet; Take 1 tablet by mouth 2 times daily, Disp-90 tablet, R-2Normal  -     amLODIPine (NORVASC) 10 MG tablet; Take 1 tablet by mouth daily TAKE 1 TABLET BY MOUTH DAILY, Disp-90 tablet, R-2Normal  4. Mixed hyperlipidemia  Tolerating statin without side effect. -     rosuvastatin (CRESTOR) 10 MG tablet; Take 1 tablet by mouth daily TAKE 1 TABLET BY MOUTH EVERY NIGHT, Disp-90 tablet, R-2Normal  5.  Hypomagnesemia  -     magnesium oxide (MAG-OX) 400 (240 Mg) MG tablet; Take 1 tablet by mouth daily, Disp-90 tablet, R-2Normal  6. Acquired hypothyroidism  Reports compliance with medication. Is taking daily on an empty stomach. Follow up on level and adjust dose if needed. -     levothyroxine (SYNTHROID) 50 MCG tablet; Take 1 tablet by mouth Daily TAKE 1 TABLET BY MOUTH DAILY BEFORE BREAKFAST, Disp-90 tablet, R-2Normal     Patient informed, we will call with blood work results within one week. If you have not heard regarding results in over a week, please contact office. You can also review results on [facility].            Edgar Medina MD

## 2022-09-30 NOTE — PATIENT INSTRUCTIONS
Patient Education        A Healthy Lifestyle: Care Instructions  A healthy lifestyle can help you feel good, have more energy, and stay at a weight that's healthy for you. You can share a healthy lifestyle with your friends and family. And you can do it on your own. Eat meals with your friends or family. You could try cooking together. Plan activities with other people. Go for a walk with a friend, try a free online fitness class, or join a sports league. Eat a variety of healthy foods. These include fruits, vegetables, whole grains, low-fat dairy, and lean protein. Choose healthy portions of food. You can use the Nutrition Facts label on food packages as a guide. Eat more fruits and vegetables. You could add vegetables to sandwiches or add fruit to cereal.  Drink water when you are thirsty. Limit soda, juice, and sports drinks. Try to exercise most days. Aim for at least 2½ hours of exercise each week. Keep moving. Work in the garden or take your dog on a walk. Use the stairs instead of the elevator. If you use tobacco or nicotine, try to quit. Ask your doctor about programs and medicines to help you quit. Limit alcohol. Men should have no more than 2 drinks a day. Women should have no more than 1. For some people, no alcohol is the best choice. Follow-up care is a key part of your treatment and safety. Be sure to make and go to all appointments, and call your doctor if you are having problems. It's also a good idea to know your test results and keep a list of the medicines you take. Where can you learn more? Go to [email or web address].WeGoOut. org and sign in to your [employer] account. Enter E525 in the [facility] box to learn more about \"A Healthy Lifestyle: Care Instructions. \"     If you do not have an account, please click on the \"Sign Up Now\" link. Current as of: March 9, 2022               Content Version: 13.4  © 0386-0153 Healthwise, Incorporated.    Care instructions adapted under license by [facility] ([facility]). If you have questions about a medical condition or this instruction, always ask your healthcare professional. [address] any warranty or liability for your use of this information.

## 2022-10-03 RX ORDER — ERGOCALCIFEROL 1.25 MG/1
50000 CAPSULE ORAL WEEKLY
Qty: 12 CAPSULE | Refills: 1 | Status: SHIPPED | OUTPATIENT
Start: 2022-10-03

## 2022-10-13 ENCOUNTER — TELEMEDICINE (OUTPATIENT)
Dept: FAMILY MEDICINE CLINIC | Facility: CLINIC | Age: 77
End: 2022-10-13
Payer: MEDICARE

## 2022-10-13 DIAGNOSIS — S16.1XXA STRAIN OF NECK MUSCLE, INITIAL ENCOUNTER: Primary | ICD-10-CM

## 2022-10-13 PROCEDURE — 99441 PR PHYS/QHP TELEPHONE EVALUATION 5-10 MIN: CPT | Performed by: FAMILY MEDICINE

## 2022-10-13 RX ORDER — TIZANIDINE 4 MG/1
4 TABLET ORAL NIGHTLY PRN
Qty: 10 TABLET | Refills: 0 | Status: SHIPPED | OUTPATIENT
Start: 2022-10-13

## 2022-10-13 NOTE — PROGRESS NOTES
Alex Barillas (:  1945) is a Established patient, here for evaluation of the following: neck pain    Assessment & Plan   Below is the assessment and plan developed based on review of pertinent history, physical exam, labs, studies, and medications. 1. Strain of neck muscle, initial encounter  History conssitent with cervical neck strain. Advised range of motion exercises. Continue with supportive measures to help with pain. Added muscle relaxant at bedtime as needed. -     tiZANidine (ZANAFLEX) 4 MG tablet; Take 1 tablet by mouth nightly as needed (neck pain), Disp-10 tablet, R-0Normal  No follow-ups on file. Follow up as needed. Advised to notify office of red flag symptoms. Subjective   Patient is being evaluated today by audio only technology for acute visit. She is a 73-year-old female here today for routine follow-up. Patient has a history of ulcerative colitis, type 2 diabetes complicated by nephropathy, hypertension, high cholesterol, hypothyroidism, and asthma. Diabetes was found to be well controlled with metformin. Reports sugars typically around 150. She continues to struggle with ulcerative colitis. She is established with GI at [facility]. She is interested in switching to GI provider at [address] [place] [address]. Patient reports neck pain that started on . Pain was initially severe but is improving. She is worried that has not fully resolved. Currently taking tylenol as needed for pain. Also icing and applying heat to neck. Increased pain with looking to the left. Denies numbness, weakness or pain shooting into arms. Has difficulty relaxing to go to sleep. Review of Systems   Constitutional:  Negative for fever. Neurological:  Negative for weakness, numbness and headaches.         Objective   Patient-Reported Vitals  No data recorded     Physical Exam  Not performed       On this date 10/13/2022 I have spent 10 minutes reviewing previous notes, test results and face to face (virtual) with the patient discussing the diagnosis and importance of compliance with the treatment plan as well as documenting on the day of the visit. I spent 5-10 minutes on the phone with the patient. [place]ar [place], was evaluated through a synchronous (real-time) audio-video encounter. The patient (or guardian if applicable) is aware that this is a billable service, which includes applicable co-pays. This Virtual Visit was conducted with patient's (and/or legal guardian's) consent. The visit was conducted pursuant to the emergency declaration under the [facility] authority and the [employer] General Act. Patient identification was verified, and a caregiver was present when appropriate. The patient was located at Home: [address] [facility]. Provider was located at [facility] ([address] [place] [address]): [place] [address] [address] [facility],  [address] [place] [address].         --Mariaelena Guerrero MD

## 2022-10-27 ENCOUNTER — NURSE ONLY (OUTPATIENT)
Dept: FAMILY MEDICINE CLINIC | Facility: CLINIC | Age: 77
End: 2022-10-27
Payer: MEDICARE

## 2022-10-27 DIAGNOSIS — Z23 ENCOUNTER FOR IMMUNIZATION: Primary | ICD-10-CM

## 2022-10-27 PROCEDURE — 90694 VACC AIIV4 NO PRSRV 0.5ML IM: CPT | Performed by: FAMILY MEDICINE

## 2022-10-27 PROCEDURE — G0008 ADMIN INFLUENZA VIRUS VAC: HCPCS | Performed by: FAMILY MEDICINE

## 2023-01-12 ENCOUNTER — OFFICE VISIT (OUTPATIENT)
Dept: FAMILY MEDICINE CLINIC | Facility: CLINIC | Age: 78
End: 2023-01-12

## 2023-01-12 VITALS
HEIGHT: 61 IN | WEIGHT: 148 LBS | DIASTOLIC BLOOD PRESSURE: 76 MMHG | HEART RATE: 71 BPM | SYSTOLIC BLOOD PRESSURE: 120 MMHG | BODY MASS INDEX: 27.94 KG/M2 | OXYGEN SATURATION: 97 %

## 2023-01-12 DIAGNOSIS — E03.9 ACQUIRED HYPOTHYROIDISM: ICD-10-CM

## 2023-01-12 DIAGNOSIS — E55.9 VITAMIN D DEFICIENCY: ICD-10-CM

## 2023-01-12 DIAGNOSIS — Z12.31 SCREENING MAMMOGRAM, ENCOUNTER FOR: ICD-10-CM

## 2023-01-12 DIAGNOSIS — S16.1XXA STRAIN OF NECK MUSCLE, INITIAL ENCOUNTER: ICD-10-CM

## 2023-01-12 DIAGNOSIS — E78.2 MIXED HYPERLIPIDEMIA: ICD-10-CM

## 2023-01-12 DIAGNOSIS — N18.31 STAGE 3A CHRONIC KIDNEY DISEASE (HCC): ICD-10-CM

## 2023-01-12 DIAGNOSIS — E11.21 TYPE 2 DIABETES MELLITUS WITH NEPHROPATHY (HCC): ICD-10-CM

## 2023-01-12 DIAGNOSIS — I10 ESSENTIAL HYPERTENSION: ICD-10-CM

## 2023-01-12 DIAGNOSIS — E83.42 HYPOMAGNESEMIA: ICD-10-CM

## 2023-01-12 DIAGNOSIS — Z00.00 MEDICARE ANNUAL WELLNESS VISIT, SUBSEQUENT: Primary | ICD-10-CM

## 2023-01-12 DIAGNOSIS — K51.00 ULCERATIVE PANCOLITIS WITHOUT COMPLICATION (HCC): ICD-10-CM

## 2023-01-12 DIAGNOSIS — J30.9 ALLERGIC RHINITIS, UNSPECIFIED SEASONALITY, UNSPECIFIED TRIGGER: ICD-10-CM

## 2023-01-12 LAB
25(OH)D3 SERPL-MCNC: 71.4 NG/ML (ref 30–100)
TSH, 3RD GENERATION: 1.25 UIU/ML (ref 0.36–3.74)

## 2023-01-12 RX ORDER — LOSARTAN POTASSIUM 50 MG/1
50 TABLET ORAL 2 TIMES DAILY
Qty: 90 TABLET | Refills: 2 | Status: SHIPPED | OUTPATIENT
Start: 2023-01-12

## 2023-01-12 RX ORDER — LEVOTHYROXINE SODIUM 0.05 MG/1
50 TABLET ORAL DAILY
Qty: 90 TABLET | Refills: 2 | Status: SHIPPED | OUTPATIENT
Start: 2023-01-12

## 2023-01-12 RX ORDER — LANOLIN ALCOHOL/MO/W.PET/CERES
400 CREAM (GRAM) TOPICAL DAILY
Qty: 90 TABLET | Refills: 2 | Status: SHIPPED | OUTPATIENT
Start: 2023-01-12

## 2023-01-12 RX ORDER — OMEPRAZOLE 40 MG/1
40 CAPSULE, DELAYED RELEASE ORAL DAILY
COMMUNITY
Start: 2022-12-08

## 2023-01-12 RX ORDER — CALCIUM POLYCARBOPHIL 625 MG
1250 TABLET ORAL DAILY
COMMUNITY
Start: 2022-12-08

## 2023-01-12 RX ORDER — TIZANIDINE 4 MG/1
4 TABLET ORAL NIGHTLY PRN
Qty: 10 TABLET | Refills: 0 | Status: SHIPPED | OUTPATIENT
Start: 2023-01-12

## 2023-01-12 RX ORDER — AMLODIPINE BESYLATE 10 MG/1
10 TABLET ORAL DAILY
Qty: 90 TABLET | Refills: 2 | Status: SHIPPED | OUTPATIENT
Start: 2023-01-12

## 2023-01-12 RX ORDER — FLUTICASONE PROPIONATE 50 MCG
2 SPRAY, SUSPENSION (ML) NASAL DAILY
Qty: 16 G | Refills: 5 | Status: SHIPPED | OUTPATIENT
Start: 2023-01-12

## 2023-01-12 ASSESSMENT — ENCOUNTER SYMPTOMS
DIARRHEA: 1
SHORTNESS OF BREATH: 0
BLOOD IN STOOL: 0
WHEEZING: 0
CHEST TIGHTNESS: 0

## 2023-01-12 ASSESSMENT — LIFESTYLE VARIABLES: HOW OFTEN DO YOU HAVE A DRINK CONTAINING ALCOHOL: NEVER

## 2023-01-12 ASSESSMENT — PATIENT HEALTH QUESTIONNAIRE - PHQ9
SUM OF ALL RESPONSES TO PHQ QUESTIONS 1-9: 0
1. LITTLE INTEREST OR PLEASURE IN DOING THINGS: 0
SUM OF ALL RESPONSES TO PHQ9 QUESTIONS 1 & 2: 0
2. FEELING DOWN, DEPRESSED OR HOPELESS: 0
SUM OF ALL RESPONSES TO PHQ QUESTIONS 1-9: 0

## 2023-01-12 NOTE — ASSESSMENT & PLAN NOTE
Well controlled. Metformin potentially contributing to diarrhea. If persists consider switching to newer agent.

## 2023-01-12 NOTE — ASSESSMENT & PLAN NOTE
Advised to avoid NSAIDs, maintain healthy blood pressure, importance of blood sugar control, and adequate fluid intake to preserve renal function. All medications reviewed and appropriately dose for chronic kidney disease.

## 2023-01-12 NOTE — PATIENT INSTRUCTIONS
Learning About Being Active as an Older Adult  Why is being active important as you get older? Being active is one of the best things you can do for your health. And it's never too late to start. Being active--or getting active, if you aren't already--has definite benefits. It can:  Give you more energy,  Keep your mind sharp. Improve balance to reduce your risk of falls. Help you manage chronic illness with fewer medicines. No matter how old you are, how fit you are, or what health problems you have, there is a form of activity that will work for you. And the more physical activity you can do, the better your overall health will be. What kinds of activity can help you stay healthy? Being more active will make your daily activities easier. Physical activity includes planned exercise and things you do in daily life. There are four types of activity:  Aerobic. Doing aerobic activity makes your heart and lungs strong. Includes walking, dancing, and gardening. Aim for at least 2½ hours spread throughout the week. It improves your energy and can help you sleep better. Muscle-strengthening. This type of activity can help maintain muscle and strengthen bones. Includes climbing stairs, using resistance bands, and lifting or carrying heavy loads. Aim for at least twice a week. It can help protect the knees and other joints. Stretching. Stretching gives you better range of motion in joints and muscles. Includes upper arm stretches, calf stretches, and gentle yoga. Aim for at least twice a week, preferably after your muscles are warmed up from other activities. It can help you function better in daily life. Balancing. This helps you stay coordinated and have good posture. Includes heel-to-toe walking, dinesh chi, and certain types of yoga. Aim for at least 3 days a week. It can reduce your risk of falling.   Even if you have a hard time meeting the recommendations, it's better to be more active than less active. All activity done in each category counts toward your weekly total. You'd be surprised how daily things like carrying groceries, keeping up with grandchildren, and taking the stairs can add up. What keeps you from being active? If you've had a hard time being more active, you're not alone. Maybe you remember being able to do more. Or maybe you've never thought of yourself as being active. It's frustrating when you can't do the things you want. Being more active can help. What's holding you back? Getting started. Have a goal, but break it into easy tasks. Small steps build into big accomplishments. Staying motivated. If you feel like skipping your activity, remember your goal. Maybe you want to move better and stay independent. Every activity gets you one step closer. Not feeling your best.  Start with 5 minutes of an activity you enjoy. Prove to yourself you can do it. As you get comfortable, increase your time. You may not be where you want to be. But you're in the process of getting there. Everyone starts somewhere. How can you find safe ways to stay active? Talk with your doctor about any physical challenges you're facing. Make a plan with your doctor if you have a health problem or aren't sure how to get started with activity. If you're already active, ask your doctor if there is anything you should change to stay safe as your body and health change. If you tend to feel dizzy after you take medicine, avoid activity at that time. Try being active before you take your medicine. This will reduce your risk of falls. If you plan to be active at home, make sure to clear your space before you get started. Remove things like TV cords, coffee tables, and throw rugs. It's safest to have plenty of space to move freely. The key to getting more active is to take it slow and steady. Try to improve only a little bit at a time.  Pick just one area to improve on at first. And if an activity hurts, stop and talk to your doctor. Where can you learn more? Go to http://www.rae.com/ and enter P600 to learn more about \"Learning About Being Active as an Older Adult. \"  Current as of: October 10, 2022               Content Version: 13.5  © 7498-3597 Healthwise, Incorporated. Care instructions adapted under license by [facility] ([facility]). If you have questions about a medical condition or this instruction, always ask your healthcare professional. [place] [id number] any warranty or liability for your use of this information. Learning About Dental Care for Older Adults  Dental care for older adults: Overview  Dental care for older people is much the same as for younger adults. But older adults do have concerns that younger adults do not. Older adults may have problems with gum disease and decay on the roots of their teeth. They may need missing teeth replaced or broken fillings fixed. Or they may have dentures that need to be cared for. Some older adults may have trouble holding a toothbrush. You can help remind the person you are caring for to brush and floss their teeth or to clean their dentures. In some cases, you may need to do the brushing and other dental care tasks. People who have trouble using their hands or who have dementia may need this extra help. How can you help with dental care? Normal dental care  To keep the teeth and gums healthy:  Brush the teeth with fluoride toothpaste twice a day--in the morning and at night--and floss at least once a day. Plaque can quickly build up on the teeth of older adults. Watch for the signs of gum disease. These signs include gums that bleed after brushing or after eating hard foods, such as apples. See a dentist regularly. Many experts recommend checkups every 6 months. Keep the dentist up to date on any new medications the person is taking.   Encourage a balanced diet that includes whole grains, vegetables, and fruits, and that is low in saturated fat and sodium. Encourage the person you're caring for not to use tobacco products. They can affect dental and general health. Many older adults have a fixed income and feel that they can't afford dental care. But most towns and [facility] have programs in which dentists help older adults by lowering fees. Contact your area's public health offices or  for information about dental care in your area. Using a toothbrush  Older adults with arthritis sometimes have trouble brushing their teeth because they can't easily hold the toothbrush. Their hands and fingers may be stiff, painful, or weak. If this is the case, you can: Offer an electric toothbrush. Enlarge the handle of a non-electric toothbrush by wrapping a sponge, an elastic bandage, or adhesive tape around it. Push the toothbrush handle through a ball made of rubber or soft foam.  Make the handle longer and thicker by taping Popsicle sticks or tongue depressors to it. You may also be able to buy special toothbrushes, toothpaste dispensers, and floss holders. Your doctor may recommend a soft-bristle toothbrush if the person you care for bleeds easily. Bleeding can happen because of a health problem or from certain medicines. A toothpaste for sensitive teeth may help if the person you care for has sensitive teeth. How do you brush and floss someone's teeth? If the person you are caring for has a hard time cleaning their teeth on their own, you may need to brush and floss their teeth for them. It may be easiest to have the person sit and face away from you, and to sit or stand behind them. That way you can steady their head against your arm as you reach around to floss and brush their teeth. Choose a place that has good lighting and is comfortable for both of you. Before you begin, gather your supplies. You will need gloves, floss, a toothbrush, and a container to hold water if you are not near a sink.  Wash and dry your hands well and put on gloves. Start by flossing:  Gently work a piece of floss between each of the teeth toward the gums. A plastic flossing tool may make this easier, and they are available at most [facility]. Curve the floss around each tooth into a U-shape and gently slide it under the gum line. Move the floss firmly up and down several times to scrape off the plaque. After you've finished flossing, throw away the used floss and begin brushing:  Wet the brush and apply toothpaste. Place the brush at a 45-degree angle where the teeth meet the gums. Press firmly, and move the brush in small circles over the surface of the teeth. Be careful not to brush too hard. Vigorous brushing can make the gums pull away from the teeth and can scratch the tooth enamel. Brush all surfaces of the teeth, on the tongue side and on the cheek side. Pay special attention to the front teeth and all surfaces of the back teeth. Brush chewing surfaces with short back-and-forth strokes. After you've finished, help the person rinse the remaining toothpaste from their mouth. Where can you learn more? Go to http://www.woods.com/ and enter F944 to learn more about \"Learning About Dental Care for Older Adults. \"  Current as of: June 16, 2022               Content Version: 13.5  © 6772-7383 Healthwise, Incorporated. Care instructions adapted under license by [facility] ([facility]). If you have questions about a medical condition or this instruction, always ask your healthcare professional. [place] [address] any warranty or liability for your use of this information. Learning About Activities of Daily Living  What are activities of daily living? Activities of daily living (ADLs) are the basic self-care tasks you do every day. As you age, and if you have health problems, you may find that it's harder to do these things for yourself. That's when you may need some help.   Your doctor uses ADLs to measure how much help you need. Knowing what you can and can't do for yourself is an important first step to getting help. And when you have the help you need, you can stay as independent as possible. Your doctor will want to know if you are able to do tasks such as: Take a bath or shower without help. Go to the bathroom by yourself. Dress and undress without help. Shave, comb your hair, and brush teeth on your own. Get in and out of bed or a chair without help. Feed yourself without help. If you are having trouble doing basic self-care tasks, talk with your doctor. You may want to bring a caregiver or family member who can help the doctor understand your needs and abilities. How will a doctor assess your ADLs? Asking about ADLs is part of a routine health checkup your doctor will likely do as you age. Your health check might be done in a doctor's office, in your home, or at a hospital. The goal is to find out if you are having any problems that could make your health problems worse or that make it unsafe for you to be on your own. To measure your ADLs, your doctor will ask how hard it is for you to do routine tasks. He or she may also want to know if you have changed the way you do a task because of a health problem. He or she may watch how you:  Walk back and forth. Keep your balance while you stand or walk. Move from sitting to standing or from a bed to a chair. Button or unbutton a shirt or sweater. Remove and put on your shoes. It's normal to feel a little worried or anxious if you find you can't do all the things you used to be able to do. Talking with your doctor about ADLs isn't a test that you either pass or fail. It's just a way to get more information about your health and safety. Follow-up care is a key part of your treatment and safety. Be sure to make and go to all appointments, and call your doctor if you are having problems.  It's also a good idea to know your test results and keep a list of the medicines you take. Current as of: October 6, 2021               Content Version: 13.5  © 2006-2022 Healthwise, [employer]. Care instructions adapted under license by [facility] ([facility]). If you have questions about a medical condition or this instruction, always ask your healthcare professional. [address] any warranty or liability for your use of this information. Advance Directives: Care Instructions  Overview  An advance directive is a legal way to state your wishes at the end of your life. It tells your family and your doctor what to do if you can't say what you want. There are two main types of advance directives. You can change them any time your wishes change. Living will. This form tells your family and your doctor your wishes about life support and other treatment. The form is also called a declaration. Medical power of . This form lets you name a person to make treatment decisions for you when you can't speak for yourself. This person is called a health care agent (health care proxy, health care surrogate). The form is also called a durable power of  for health care. If you do not have an advance directive, decisions about your medical care may be made by a family member, or by a doctor or a  who doesn't know you. It may help to think of an advance directive as a gift to the people who care for you. If you have one, they won't have to make tough decisions by themselves. For more information, including forms for your state, see the [address] [facility] website (www.caringinfo.org/planning/advance-directives/). Follow-up care is a key part of your treatment and safety. Be sure to make and go to all appointments, and call your doctor if you are having problems. It's also a good idea to know your test results and keep a list of the medicines you take. What should you include in an advance directive?   Many states have a unique advance directive form. (It may ask you to address specific issues.) Or you might use a universal form that's approved by many states. If your form doesn't tell you what to address, it may be hard to know what to include in your advance directive. Use the questions below to help you get started. Who do you want to make decisions about your medical care if you are not able to? What life-support measures do you want if you have a serious illness that gets worse over time or can't be cured? What are you most afraid of that might happen? (Maybe you're afraid of having pain, losing your independence, or being kept alive by machines.)  Where would you prefer to die? (Your home? A hospital? A nursing home?)  Do you want to donate your organs when you die? Do you want certain [employer] practices performed before you die? When should you call for help? Be sure to contact your doctor if you have any questions. Where can you learn more? Go to http://www.rae.com/ and enter R264 to learn more about \"Advance Directives: Care Instructions. \"  Current as of: June 16, 2022               Content Version: 13.5  © 8744-0047 Healthwise, Incorporated. Care instructions adapted under license by [facility] [address] If you have questions about a medical condition or this instruction, always ask your healthcare professional. [address] any warranty or liability for your use of this information. Personalized Preventive Plan for Yessenia Freemant - 1/12/2023  Medicare offers a range of preventive health benefits. Some of the tests and screenings are paid in full while other may be subject to a deductible, co-insurance, and/or copay. Some of these benefits include a comprehensive review of your medical history including lifestyle, illnesses that may run in your family, and various assessments and screenings as appropriate.     After reviewing your medical record and screening and assessments performed today your provider may have ordered immunizations, labs, imaging, and/or referrals for you. A list of these orders (if applicable) as well as your Preventive Care list are included within your After Visit Summary for your review. Other Preventive Recommendations:    A preventive eye exam performed by an eye specialist is recommended every 1-2 years to screen for glaucoma; cataracts, macular degeneration, and other eye disorders. A preventive dental visit is recommended every 6 months. Try to get at least 150 minutes of exercise per week or 10,000 steps per day on a pedometer . Order or download the FREE \"Exercise & Physical Activity: Your Everyday Guide\" from The [employer]. Call 7-992.312.7048 or search The [employer] online. You need 4046-8872 mg of calcium and 9801-5040 IU of vitamin D per day. It is possible to meet your calcium requirement with diet alone, but a vitamin D supplement is usually necessary to meet this goal.  When exposed to the sun, use a sunscreen that protects against both UVA and UVB radiation with an SPF of 30 or greater. Reapply every 2 to 3 hours or after sweating, drying off with a towel, or swimming. Always wear a seat belt when traveling in a car. Always wear a helmet when riding a bicycle or motorcycle.

## 2023-01-12 NOTE — PROGRESS NOTES
Medicare Annual Wellness Visit    Alex Barillas is here for Medicare AWV    Assessment & Plan   Medicare annual wellness visit, subsequent   -Updated problem list, care team, and history  -Discussed recommended vaccines including COVID booster  -Annual mammogram done 09/2022 at [facility]  -last DEXA 2021 with osteopenia. Plan to repeat 2023 with next mammogram.     Recommendations for Preventive Services Due: see orders and patient instructions/AVS.  Recommended screening schedule for the next 5-10 years is provided to the patient in written form: see Patient Instructions/AVS.     Return for Medicare Annual Wellness Visit in 1 year. Subjective     Patient's complete Health Risk Assessment and screening values have been reviewed and are found in Flowsheets. The following problems were reviewed today and where indicated follow up appointments were made and/or referrals ordered. Positive Risk Factor Screenings with Interventions:                 Weight and Activity:  Physical Activity: Inactive    Days of Exercise per Week: 0 days    Minutes of Exercise per Session: 0 min     On average, how many days per week do you engage in moderate to strenuous exercise (like a brisk walk)?: 0 days  Have you lost any weight without trying in the past 3 months?: No  Body mass index: (!) 27.96      Inactivity Interventions:  Encouraged increased physical activity as tolerated      Dentist Screen:  Have you seen the dentist within the past year?: (!) No    Intervention:  Advised to keep regular semiannual dental appointments       ADL's:   Patient reports needing help with:  Select all that apply: (!) Transportation  Interventions:  Patient comments: friend that helps with transportation                    Objective   Vitals:    01/12/23 1024   BP: 120/76   Pulse: 71   SpO2: 97%   Weight: 148 lb (67.1 kg)   Height: 5' 1\" (1.549 m)      Body mass index is 27.96 kg/m².               Allergies   Allergen Reactions    Enoxaparin Other (See Comments)     Hospital just told her to never use that again. Heparin (Porcine) Other (See Comments)     HIT    Morphine Nausea Only     Nausea and stomach pains    Sulfa Antibiotics Nausea And Vomiting     Prior to Visit Medications    Medication Sig Taking?  Authorizing Provider   tiZANidine (ZANAFLEX) 4 MG tablet Take 1 tablet by mouth nightly as needed (neck pain) Yes Karlie Barba MD   vitamin D (ERGOCALCIFEROL) 1.25 MG (61092 UT) CAPS capsule Take 1 capsule by mouth once a week Yes Karlie Barba MD   timolol (TIMOPTIC) 0.25 % ophthalmic solution INSTILL 1 DROP IN BOTH EYES TWICE DAILY Yes Historical Provider, MD   rosuvastatin (CRESTOR) 10 MG tablet Take 1 tablet by mouth daily TAKE 1 TABLET BY MOUTH EVERY NIGHT Yes Karlie Barba MD   potassium chloride (KLOR-CON M) 20 MEQ extended release tablet Take 0.5 tablets by mouth 2 times daily TAKE 1 TABLET BY MOUTH TWICE DAILY Yes Karlie Barba MD   metFORMIN (GLUCOPHAGE) 500 MG tablet Take 1 tablet by mouth 2 times daily (with meals) Yes Karlie Barba MD   magnesium oxide (MAG-OX) 400 (240 Mg) MG tablet Take 1 tablet by mouth daily Yes Karlie Barba MD   losartan (COZAAR) 50 MG tablet Take 1 tablet by mouth 2 times daily Yes Karlie Barba MD   levothyroxine (SYNTHROID) 50 MCG tablet Take 1 tablet by mouth Daily TAKE 1 TABLET BY MOUTH DAILY BEFORE BREAKFAST Yes Karlie Barba MD   amLODIPine (NORVASC) 10 MG tablet Take 1 tablet by mouth daily TAKE 1 TABLET BY MOUTH DAILY Yes Karlie Barba MD   fluticasone (FLONASE) 50 MCG/ACT nasal spray 2 sprays by Nasal route daily Yes Karlie Barba MD   acetaminophen (TYLENOL) 650 MG extended release tablet Take 650 mg by mouth every 8 hours Yes Ar Automatic Reconciliation   albuterol sulfate  (90 Base) MCG/ACT inhaler Inhale 2 puffs into the lungs every 4 hours as needed Yes Ar Automatic Reconciliation   brimonidine (ALPHAGAN P) 0.1 % SOLN every 8 hours Yes Ar Automatic Reconciliation brimonidine (ALPHAGAN) 0.2 % ophthalmic solution INSTILL 1 DROP IN BOTH EYES THREE TIMES DAILY Yes Ar Automatic Reconciliation   famotidine (PEPCID) 40 MG tablet Take 40 mg by mouth daily as needed Yes Ar Automatic Reconciliation   ondansetron (ZOFRAN-ODT) 4 MG disintegrating tablet Place 4 mg under the tongue 3 times daily as needed Yes Ar Automatic Reconciliation   polyethyl glycol-propyl glycol 0.4-0.3 % (SYSTANE) 0.4-0.3 % ophthalmic solution as needed Yes Ar Automatic Reconciliation   simethicone (MYLICON) 80 MG chewable tablet Take 125 mg by mouth as needed Yes Ar Automatic Reconciliation   triamcinolone (KENALOG) 0.025 % ointment Apply topically 2 times daily Yes Ar Automatic Reconciliation   ustekinumab (STELARA) 130 MG/26ML SOLN Infuse intravenously Yes Ar Automatic Reconciliation       CareTeam (Including outside providers/suppliers regularly involved in providing care):   Patient Care Team:  Talon Coffman MD as PCP - Ludmila Garcia MD as PCP - [facility] Empaneled Provider  Zulema Adrian MD as Physician  Kathy Khan MD (Gastroenterology)  Cris Youssef MD (Ophthalmology)  Podiatry     Reviewed and updated this visit:  Tobacco  Allergies  Med Hx  Surg Hx  Soc Hx  Fam Hx           Letitia Joe is a 76 y.o. female who presents today for the following:  Chief Complaint   Patient presents with    Medicare AWV       Allergies   Allergen Reactions    Enoxaparin Other (See Comments)     Hospital just told her to never use that again.     Heparin (Porcine) Other (See Comments)     HIT    Morphine Nausea Only     Nausea and stomach pains    Sulfa Antibiotics Nausea And Vomiting       Current Outpatient Medications   Medication Sig Dispense Refill    omeprazole (PRILOSEC) 40 MG delayed release capsule Take 40 mg by mouth daily      polycarbophil (FIBERCON) 625 MG tablet Take 1,250 mg by mouth daily      tiZANidine (ZANAFLEX) 4 MG tablet Take 1 tablet by mouth nightly as needed (neck pain) 10 tablet 0    vitamin D (ERGOCALCIFEROL) 1.25 MG (19784 UT) CAPS capsule Take 1 capsule by mouth once a week 12 capsule 1    timolol (TIMOPTIC) 0.25 % ophthalmic solution INSTILL 1 DROP IN BOTH EYES TWICE DAILY      rosuvastatin (CRESTOR) 10 MG tablet Take 1 tablet by mouth daily TAKE 1 TABLET BY MOUTH EVERY NIGHT 90 tablet 2    potassium chloride (KLOR-CON M) 20 MEQ extended release tablet Take 0.5 tablets by mouth 2 times daily TAKE 1 TABLET BY MOUTH TWICE DAILY 180 tablet 2    metFORMIN (GLUCOPHAGE) 500 MG tablet Take 1 tablet by mouth 2 times daily (with meals) 180 tablet 2    magnesium oxide (MAG-OX) 400 (240 Mg) MG tablet Take 1 tablet by mouth daily 90 tablet 2    losartan (COZAAR) 50 MG tablet Take 1 tablet by mouth 2 times daily 90 tablet 2    levothyroxine (SYNTHROID) 50 MCG tablet Take 1 tablet by mouth Daily TAKE 1 TABLET BY MOUTH DAILY BEFORE BREAKFAST 90 tablet 2    amLODIPine (NORVASC) 10 MG tablet Take 1 tablet by mouth daily TAKE 1 TABLET BY MOUTH DAILY 90 tablet 2    fluticasone (FLONASE) 50 MCG/ACT nasal spray 2 sprays by Nasal route daily 16 g 5    acetaminophen (TYLENOL) 650 MG extended release tablet Take 650 mg by mouth every 8 hours      albuterol sulfate  (90 Base) MCG/ACT inhaler Inhale 2 puffs into the lungs every 4 hours as needed      brimonidine (ALPHAGAN P) 0.1 % SOLN every 8 hours      brimonidine (ALPHAGAN) 0.2 % ophthalmic solution INSTILL 1 DROP IN BOTH EYES THREE TIMES DAILY      famotidine (PEPCID) 40 MG tablet Take 40 mg by mouth daily as needed      ondansetron (ZOFRAN-ODT) 4 MG disintegrating tablet Place 4 mg under the tongue 3 times daily as needed      polyethyl glycol-propyl glycol 0.4-0.3 % (SYSTANE) 0.4-0.3 % ophthalmic solution as needed      simethicone (MYLICON) 80 MG chewable tablet Take 125 mg by mouth as needed      triamcinolone (KENALOG) 0.025 % ointment Apply topically 2 times daily      ustekinumab (STELARA) 130 MG/26ML SOLN Infuse intravenously No current facility-administered medications for this visit. Patient is a 73-year-old female here today for routine follow-up. Patient has a history of ulcerative colitis, type 2 diabetes complicated by nephropathy, hypertension, high cholesterol, hypothyroidism, and asthma. Diabetes was found to be well controlled with metformin. Reports sugars typically around 12. She continues to struggle with ulcerative colitis. She reports frequent diarrhea. Review of Systems   Constitutional:  Negative for fatigue and unexpected weight change. Respiratory:  Negative for chest tightness, shortness of breath and wheezing. Cardiovascular:  Negative for chest pain, palpitations and leg swelling. Gastrointestinal:  Positive for diarrhea. Negative for blood in stool. Neurological:  Negative for dizziness and numbness. /76   Pulse 71   Ht 5' 1\" (1.549 m)   Wt 148 lb (67.1 kg)   SpO2 97%   BMI 27.96 kg/m²     Physical Exam  Vitals reviewed. Constitutional:       General: She is not in acute distress. Appearance: Normal appearance. She is not ill-appearing. Eyes:      General: No scleral icterus. Conjunctiva/sclera: Conjunctivae normal.   Neck:      Vascular: No carotid bruit. Cardiovascular:      Rate and Rhythm: Normal rate and regular rhythm. Heart sounds: Normal heart sounds. No murmur heard. Pulmonary:      Effort: Pulmonary effort is normal. No respiratory distress. Breath sounds: Normal breath sounds. No wheezing. Abdominal:      General: There is no distension. Musculoskeletal:      Cervical back: Neck supple. Right lower leg: No edema. Left lower leg: No edema. Skin:     General: Skin is warm and dry. Neurological:      General: No focal deficit present. Mental Status: She is oriented to person, place, and time.    Psychiatric:         Mood and Affect: Mood normal.         Behavior: Behavior normal.          Hemoglobin A1C, POC Date Value Ref Range Status   09/30/2022 6.1 % Final   06/30/2022 6.0 % Final   09/21/2021 6.4 % Final         2. Type 2 diabetes mellitus with nephropathy ([facility]  Assessment & Plan:  Well controlled. Metformin potentially contributing to diarrhea. If persists consider switching to newer agent. Orders:  -     AMB POC HEMOGLOBIN A1C  -     TSH; Future  -     metFORMIN (GLUCOPHAGE) 500 MG tablet; Take 1 tablet by mouth 2 times daily (with meals), Disp-180 tablet, R-2Normal  3. Stage 3a chronic kidney disease ([facility]  Assessment & Plan:  Advised to avoid NSAIDs, maintain healthy blood pressure, importance of blood sugar control, and adequate fluid intake to preserve renal function. All medications reviewed and appropriately dose for chronic kidney disease. 4. Ulcerative pancolitis without complication ([facility]  Established with [facility]  5. Vitamin D deficiency  -     Vitamin D 25 Hydroxy; Future  6. Screening mammogram, encounter for  Completed at [facility]  7. Essential hypertension  Assessment & Plan:  Blood pressure is at goal for age. Encouraged continued medication compliance, DASH or Mediterranean diet and daily physical activity. Orders:  -     losartan (COZAAR) 50 MG tablet; Take 1 tablet by mouth 2 times daily, Disp-90 tablet, R-2Normal  -     amLODIPine (NORVASC) 10 MG tablet; Take 1 tablet by mouth daily TAKE 1 TABLET BY MOUTH DAILY, Disp-90 tablet, R-2Normal  8. Acquired hypothyroidism  Assessment & Plan:  Reports compliance with medication. Is taking daily on an empty stomach. Follow up on level and adjust dose if needed. Orders:  -     levothyroxine (SYNTHROID) 50 MCG tablet; Take 1 tablet by mouth Daily TAKE 1 TABLET BY MOUTH DAILY BEFORE BREAKFAST, Disp-90 tablet, R-2Normal  9. Hypomagnesemia  -     magnesium oxide (MAG-OX) 400 (240 Mg) MG tablet; Take 1 tablet by mouth daily, Disp-90 tablet, R-2Normal  10. Strain of neck muscle, initial encounter  -     tiZANidine (ZANAFLEX) 4 MG tablet;  Take 1 tablet by mouth nightly as needed (neck pain), Disp-10 tablet, R-0Normal  11. Allergic rhinitis, unspecified seasonality, unspecified trigger  -     fluticasone (FLONASE) 50 MCG/ACT nasal spray; 2 sprays by Nasal route daily, Disp-16 g, R-5Normal  12. Mixed hyperlipidemia  Assessment & Plan:  Patient is taking statin without side effect. Discussed benefits of statin in prevention of coronary arterty and cerebrovascular disease. Last LDL was 81         Patient informed, we will call with blood work results within one week. If you have not heard regarding results in over a week, please contact office. You can also review results on [employer]. Follow-up and Dispositions    Return for Medicare Annual Wellness Visit in 1 year.          Elisabeth Olguin MD

## 2023-01-12 NOTE — ASSESSMENT & PLAN NOTE
Patient is taking statin without side effect. Discussed benefits of statin in prevention of coronary arterty and cerebrovascular disease.   Last LDL was 81

## 2023-01-12 NOTE — ASSESSMENT & PLAN NOTE
Reports compliance with medication. Is taking daily on an empty stomach. Follow up on level and adjust dose if needed.

## 2023-01-13 RX ORDER — ACETAMINOPHEN 160 MG
2000 TABLET,DISINTEGRATING ORAL DAILY
Qty: 90 CAPSULE | Refills: 2 | Status: SHIPPED | OUTPATIENT
Start: 2023-01-13

## 2023-04-07 ENCOUNTER — TELEPHONE (OUTPATIENT)
Dept: FAMILY MEDICINE CLINIC | Facility: CLINIC | Age: 78
End: 2023-04-07

## 2023-04-07 DIAGNOSIS — J45.30 MILD PERSISTENT ASTHMA, UNCOMPLICATED: Primary | ICD-10-CM

## 2023-04-07 NOTE — TELEPHONE ENCOUNTER
Patient saw [facility] in the past and she called to make a an appointment, they told her she needed a new referral since its been 5 years. She wants to follow up with her shortness of breath again.  Thank you

## 2023-04-07 NOTE — TELEPHONE ENCOUNTER
Josefina was seen today for other. Diagnoses and all orders for this visit:    Mild persistent asthma, uncomplicated  Prior breathing tests consistent with asthma.   Will refer back to pulmonary per patient request.  [facility]

## 2023-05-11 ENCOUNTER — OFFICE VISIT (OUTPATIENT)
Dept: FAMILY MEDICINE CLINIC | Facility: CLINIC | Age: 78
End: 2023-05-11

## 2023-05-11 VITALS
BODY MASS INDEX: 28.36 KG/M2 | HEIGHT: 61 IN | HEART RATE: 67 BPM | DIASTOLIC BLOOD PRESSURE: 62 MMHG | OXYGEN SATURATION: 98 % | WEIGHT: 150.2 LBS | SYSTOLIC BLOOD PRESSURE: 134 MMHG | TEMPERATURE: 97.6 F

## 2023-05-11 DIAGNOSIS — E03.9 ACQUIRED HYPOTHYROIDISM: ICD-10-CM

## 2023-05-11 DIAGNOSIS — E55.9 VITAMIN D DEFICIENCY: ICD-10-CM

## 2023-05-11 DIAGNOSIS — E11.21 TYPE 2 DIABETES MELLITUS WITH NEPHROPATHY (HCC): Primary | ICD-10-CM

## 2023-05-11 DIAGNOSIS — I10 ESSENTIAL HYPERTENSION: ICD-10-CM

## 2023-05-11 DIAGNOSIS — J30.9 ALLERGIC RHINITIS, UNSPECIFIED SEASONALITY, UNSPECIFIED TRIGGER: ICD-10-CM

## 2023-05-11 DIAGNOSIS — E11.9 ENCOUNTER FOR DIABETIC FOOT EXAM (HCC): ICD-10-CM

## 2023-05-11 DIAGNOSIS — E83.42 HYPOMAGNESEMIA: ICD-10-CM

## 2023-05-11 DIAGNOSIS — E78.2 MIXED HYPERLIPIDEMIA: ICD-10-CM

## 2023-05-11 LAB — HBA1C MFR BLD: 6.3 %

## 2023-05-11 RX ORDER — PANTOPRAZOLE SODIUM 40 MG/1
40 TABLET, DELAYED RELEASE ORAL DAILY
COMMUNITY
Start: 2023-04-03

## 2023-05-11 SDOH — ECONOMIC STABILITY: FOOD INSECURITY: WITHIN THE PAST 12 MONTHS, THE FOOD YOU BOUGHT JUST DIDN'T LAST AND YOU DIDN'T HAVE MONEY TO GET MORE.: NEVER TRUE

## 2023-05-11 SDOH — ECONOMIC STABILITY: HOUSING INSECURITY
IN THE LAST 12 MONTHS, WAS THERE A TIME WHEN YOU DID NOT HAVE A STEADY PLACE TO SLEEP OR SLEPT IN A SHELTER (INCLUDING NOW)?: NO

## 2023-05-11 SDOH — ECONOMIC STABILITY: INCOME INSECURITY: HOW HARD IS IT FOR YOU TO PAY FOR THE VERY BASICS LIKE FOOD, HOUSING, MEDICAL CARE, AND HEATING?: NOT HARD AT ALL

## 2023-05-11 SDOH — ECONOMIC STABILITY: FOOD INSECURITY: WITHIN THE PAST 12 MONTHS, YOU WORRIED THAT YOUR FOOD WOULD RUN OUT BEFORE YOU GOT MONEY TO BUY MORE.: NEVER TRUE

## 2023-05-11 ASSESSMENT — PATIENT HEALTH QUESTIONNAIRE - PHQ9
1. LITTLE INTEREST OR PLEASURE IN DOING THINGS: 0
2. FEELING DOWN, DEPRESSED OR HOPELESS: 0
SUM OF ALL RESPONSES TO PHQ QUESTIONS 1-9: 0
SUM OF ALL RESPONSES TO PHQ9 QUESTIONS 1 & 2: 0
SUM OF ALL RESPONSES TO PHQ QUESTIONS 1-9: 0

## 2023-05-11 NOTE — PROGRESS NOTES
mouth every 8 hours      albuterol sulfate  (90 Base) MCG/ACT inhaler Inhale 2 puffs into the lungs every 4 hours as needed      brimonidine (ALPHAGAN P) 0.1 % SOLN every 8 hours      brimonidine (ALPHAGAN) 0.2 % ophthalmic solution INSTILL 1 DROP IN BOTH EYES THREE TIMES DAILY      famotidine (PEPCID) 40 MG tablet Take 40 mg by mouth daily as needed      ondansetron (ZOFRAN-ODT) 4 MG disintegrating tablet Place 4 mg under the tongue 3 times daily as needed      polyethyl glycol-propyl glycol 0.4-0.3 % (SYSTANE) 0.4-0.3 % ophthalmic solution as needed      simethicone (MYLICON) 80 MG chewable tablet Take 125 mg by mouth as needed      triamcinolone (KENALOG) 0.025 % ointment Apply topically 2 times daily      ustekinumab (STELARA) 130 MG/26ML SOLN Infuse intravenously       No current facility-administered medications for this visit.        Past Medical History:   Diagnosis Date    Abnormal cardiovascular function study     Acute cholecystitis 12/17/2013    Allergic rhinitis 9/27/2012    Allergic rhinitis due to allergen 7/1/2015    Anemia 9/27/2012    Atrial fibrillation (HCC)     Blindness of right eye     Chronic anemia 7/1/2015    Chronic kidney disease, stage III (moderate) ([facility] 9/27/2012    Crohn's disease ([facility]     Deep vein thrombosis (DVT) of left lower extremity ([facility] 12/9/2016    Diabetes ([facility]     DM2 (diabetes mellitus, type 2) ([facility] 9/27/2012    Elevated alkaline phosphatase level 5/4/2018    Essential hypertension 7/1/2015    Gastroesophageal reflux disease without esophagitis 7/1/2015    GERD (gastroesophageal reflux disease) 9/27/2012    Glaucoma     left eye blind    Heart murmur     HLD (hyperlipidemia) 9/27/2012    HTN (hypertension)     Hypokalemia 9/27/2012    Hypomagnesemia 10/3/2017    Hypothyroidism     Hypothyroidism 9/27/2012    Hypothyroidism due to medication 7/1/2015    Inflammatory bowel disease 7/1/2015    Palpitations     Personal history of asthma 8/24/2016    Syncope

## 2023-05-12 PROBLEM — K02.9 DENTAL CARIES EXTENDING INTO PULP: Status: RESOLVED | Noted: 2017-02-24 | Resolved: 2023-05-12

## 2023-05-12 RX ORDER — AMLODIPINE BESYLATE 10 MG/1
10 TABLET ORAL DAILY
Qty: 90 TABLET | Refills: 2 | Status: SHIPPED | OUTPATIENT
Start: 2023-05-12

## 2023-05-12 RX ORDER — POTASSIUM CHLORIDE 20 MEQ/1
10 TABLET, EXTENDED RELEASE ORAL 2 TIMES DAILY
Qty: 180 TABLET | Refills: 2 | Status: SHIPPED | OUTPATIENT
Start: 2023-05-12

## 2023-05-12 RX ORDER — LANOLIN ALCOHOL/MO/W.PET/CERES
400 CREAM (GRAM) TOPICAL DAILY
Qty: 90 TABLET | Refills: 2 | Status: SHIPPED | OUTPATIENT
Start: 2023-05-12

## 2023-05-12 RX ORDER — ACETAMINOPHEN 160 MG
2000 TABLET,DISINTEGRATING ORAL DAILY
Qty: 90 CAPSULE | Refills: 2 | Status: SHIPPED | OUTPATIENT
Start: 2023-05-12

## 2023-05-12 RX ORDER — ROSUVASTATIN CALCIUM 10 MG/1
10 TABLET, COATED ORAL DAILY
Qty: 90 TABLET | Refills: 2 | Status: SHIPPED | OUTPATIENT
Start: 2023-05-12

## 2023-05-12 RX ORDER — LOSARTAN POTASSIUM 50 MG/1
50 TABLET ORAL 2 TIMES DAILY
Qty: 90 TABLET | Refills: 2 | Status: SHIPPED | OUTPATIENT
Start: 2023-05-12

## 2023-05-12 RX ORDER — LEVOTHYROXINE SODIUM 0.05 MG/1
50 TABLET ORAL DAILY
Qty: 90 TABLET | Refills: 2 | Status: SHIPPED | OUTPATIENT
Start: 2023-05-12

## 2023-05-12 RX ORDER — FLUTICASONE PROPIONATE 50 MCG
2 SPRAY, SUSPENSION (ML) NASAL DAILY
Qty: 16 G | Refills: 5 | Status: SHIPPED | OUTPATIENT
Start: 2023-05-12

## 2023-05-12 ASSESSMENT — ENCOUNTER SYMPTOMS
WHEEZING: 0
SHORTNESS OF BREATH: 1
BLOOD IN STOOL: 0
DIARRHEA: 1
CHEST TIGHTNESS: 0

## 2023-05-12 NOTE — ASSESSMENT & PLAN NOTE
Reports compliance with medication. Is taking daily on an empty stomach. Plan to check TSH at next visit.

## 2023-05-12 NOTE — ASSESSMENT & PLAN NOTE
Blood pressure is at goal for age.   Encouraged continued medication compliance, DASH or Mediterranean diet and daily physical activity.  -Normal ECHO in 2021 except for mild mitral regurgitation

## 2023-05-18 ENCOUNTER — TELEPHONE (OUTPATIENT)
Dept: FAMILY MEDICINE CLINIC | Facility: CLINIC | Age: 78
End: 2023-05-18

## 2023-05-18 DIAGNOSIS — J45.30 MILD PERSISTENT ASTHMA WITHOUT COMPLICATION: Primary | ICD-10-CM

## 2023-05-18 RX ORDER — ALBUTEROL SULFATE 90 UG/1
2 AEROSOL, METERED RESPIRATORY (INHALATION) 4 TIMES DAILY PRN
Qty: 18 G | Refills: 3 | Status: SHIPPED | OUTPATIENT
Start: 2023-05-18

## 2023-05-18 NOTE — TELEPHONE ENCOUNTER
Patient called and would like to get an inhaler like Proventil  to have for emergency just in case her asthma flares, she sees the pulmonologist in June.  Please advise

## 2023-05-19 NOTE — TELEPHONE ENCOUNTER
Josefina was seen today for medication refill and other. Diagnoses and all orders for this visit:    Mild persistent asthma without complication  -     albuterol sulfate HFA (VENTOLIN HFA) 108 (90 Base) MCG/ACT inhaler;  Inhale 2 puffs into the lungs 4 times daily as needed for Wheezing

## 2023-05-26 DIAGNOSIS — J45.30 MILD PERSISTENT ASTHMA WITHOUT COMPLICATION: Primary | ICD-10-CM

## 2023-06-02 ENCOUNTER — HOSPITAL ENCOUNTER (OUTPATIENT)
Dept: GENERAL RADIOLOGY | Age: 78
End: 2023-06-02
Payer: MEDICARE

## 2023-06-02 ENCOUNTER — OFFICE VISIT (OUTPATIENT)
Dept: PULMONOLOGY | Age: 78
End: 2023-06-02

## 2023-06-02 VITALS
HEIGHT: 61 IN | SYSTOLIC BLOOD PRESSURE: 126 MMHG | RESPIRATION RATE: 18 BRPM | WEIGHT: 152 LBS | BODY MASS INDEX: 28.7 KG/M2 | TEMPERATURE: 97 F | OXYGEN SATURATION: 99 % | HEART RATE: 79 BPM | DIASTOLIC BLOOD PRESSURE: 78 MMHG

## 2023-06-02 DIAGNOSIS — R29.818 SUSPECTED SLEEP APNEA: ICD-10-CM

## 2023-06-02 DIAGNOSIS — J30.89 ENVIRONMENTAL AND SEASONAL ALLERGIES: ICD-10-CM

## 2023-06-02 DIAGNOSIS — R06.02 SOB (SHORTNESS OF BREATH): ICD-10-CM

## 2023-06-02 DIAGNOSIS — Z86.711 HX OF PULMONARY EMBOLUS: Primary | ICD-10-CM

## 2023-06-02 DIAGNOSIS — J45.30 MILD PERSISTENT ASTHMA WITHOUT COMPLICATION: ICD-10-CM

## 2023-06-02 LAB
EXPIRATORY TIME: NORMAL
FEF 25-75% %PRED-PRE: NORMAL
FEF 25-75% PRED: NORMAL
FEF 25-75%-PRE: NORMAL
FEV1 %PRED-PRE: 90 %
FEV1 PRED: NORMAL
FEV1/FVC %PRED-PRE: NORMAL
FEV1/FVC PRED: NORMAL
FEV1/FVC: 73 %
FEV1: 1.2 L
FVC %PRED-PRE: 95 %
FVC PRED: NORMAL
FVC: 1.64 L
PEF %PRED-PRE: NORMAL
PEF PRED: NORMAL
PEF-PRE: NORMAL

## 2023-06-02 PROCEDURE — 71046 X-RAY EXAM CHEST 2 VIEWS: CPT

## 2023-06-02 ASSESSMENT — PULMONARY FUNCTION TESTS
FEV1/FVC: 73
FVC: 1.64
FEV1_PERCENT_PREDICTED_PRE: 90
FEV1: 1.20
FVC_PERCENT_PREDICTED_PRE: 95

## 2023-06-02 NOTE — PATIENT INSTRUCTIONS
Tests to ask [facility] about   Complete pulmonary function tests  Home sleep study    Inhalers they cover for asthma

## 2023-06-02 NOTE — PROGRESS NOTES
LABS:   Lab Results   Component Value Date/Time    WBC 5.5 09/30/2022 10:46 AM    HGB 12.3 09/30/2022 10:46 AM    HCT 39.8 09/30/2022 10:46 AM     09/30/2022 10:46 AM    TSH 0.838 03/28/2022 09:36 AM     Imaging: I performed an independent interpretation of the patient's images. CXR: 6/2/23        CT Chest: 12/2016      Nuclear Medicine: No results found for this or any previous visit from the past 3650 days. PFTs:   Date   6/2/23      FVC    1.64-95%      FEV1    1.20-90%      FEV1/FVC    73%      FEF 25-75%    0.83-73%      Bronchodilator Response          TLC          RV          DLCO              FeNO:6/2/23-22  FeNO and Likelihood of Eosinophilic Asthma   Unlikely Intermediate Likely   <25 ppb 25-50 ppb >50ppb     Exercise Oximetry:    Echo: No results found for this or any previous visit from the past 3650 days.       [facility] Reference Info:                                                                                                                Exposure History:  Second Hand Smoke Exposure: Yes  Birds: No  Asbestos: No  TB: No  Hot Tubs/Humidifier: No  Organic/Inorganic Dusts: No  Molds: No  Occupation/Hobbies:   Immunization History   Administered Date(s) Administered    COVID-19, PFIZER PURPLE top, DILUTE for use, (age 15 y+), 30mcg/0.3mL 03/23/2021, 04/20/2021, 12/10/2021    Influenza Trivalent 09/12/2013, 09/08/2014    Influenza Virus Vaccine 01/01/2011    Influenza, FLUAD, (age 72 y+), Adjuvanted, 0.5mL 10/30/2020, 10/27/2022    Influenza, High Dose (Fluzone 65 yrs and older) 11/15/2017    Influenza, Triv, inactivated, subunit, adjuvanted, IM (Fluad 65 yrs and older) 11/22/2019    Influenza, Trivalent PF 10/01/2015    PPD Test 12/09/2016, 12/13/2016    Pneumococcal, PCV-13, PREVNAR 13, (age 6w+), IM, 0.5mL 07/01/2015    Pneumococcal, PPSV23, PNEUMOVAX 21, (age 2y+), SC/IM, 0.5mL 12/07/2012     Past Medical History:   Diagnosis Date    Abnormal cardiovascular function study

## 2023-06-30 NOTE — PROGRESS NOTES
End of Shift Note: Surgical    Procedure:  6/28/2023 - 6/29/2023 Day of Surgery  Uresteroscopy, with left ureteral stent.  Pain Management: Last Pain Score: 0/10  Medication n/a.   Last given: n/a  Diet: Regular  Bowel Function:Normal  LBM: 02/26/2023  Activity:Independent  Number of times ambulated:  5.  Distance:  500ft.  Significant Events: none  LDAs: peripheral IV, [place]  Discharge Plan: Anticipated discharge date:  TBD.                     Disposition:   TBD              Please call the patient regarding her abnormal result. Called and spoke with PT concerning lab results. Pt stated that she had received a letter stating the results and recommendations to repeat in one year.

## 2023-07-24 ENCOUNTER — TELEPHONE (OUTPATIENT)
Dept: FAMILY MEDICINE CLINIC | Facility: CLINIC | Age: 78
End: 2023-07-24

## 2023-07-24 DIAGNOSIS — Z12.31 SCREENING MAMMOGRAM, ENCOUNTER FOR: Primary | ICD-10-CM

## 2023-07-24 NOTE — TELEPHONE ENCOUNTER
Returned patient's call, notifying her of Dr. Garcia Na response. Patient verbalized understanding & states that she will go ahead with the mammogram. She would like the order sent to [facility]. Order printed & faxed to [phone], as requested.

## 2023-07-24 NOTE — TELEPHONE ENCOUNTER
Patient wants to know if she should still have a mammogram, if so she wants to go ahead and schedule one. Please advise.  Thanks

## 2023-08-16 ENCOUNTER — HOSPITAL ENCOUNTER (OUTPATIENT)
Dept: SLEEP CENTER | Age: 78
Discharge: HOME | End: 2023-08-19

## 2023-09-06 ENCOUNTER — TELEPHONE (OUTPATIENT)
Dept: SLEEP MEDICINE | Age: 78
End: 2023-09-06

## 2023-09-07 NOTE — PROGRESS NOTES
discussed. Specifically, the increased incidence of hypertension, coronary artery disease, congestive heart failure, pulmonary hypertension, gastroesophageal reflux, pathologic hypersomnolence, memory loss, and glucose intolerance was related to the consequences of hypoxemia, hypercapnia, airway obstruction, and sympathetic overdrive. We also discussed the ability of nasal CPAP to correct these abnormalities through maintenance of a patent airway. Pt to start PAP therapy. Supplies and orders sent to [facility]. She is aware to contact our office for issues with PAP therapy. Check SHERI on PAP therapy in one month DME - [facility]      2. Nocturnal hypoxemia -should improve with PAP therapy. DME - DURABLE MEDICAL EQUIPMENT           PLAN:      Orders Placed This Encounter   Procedures    Pulse oximetry, overnight     Scheduling Instructions:      Please send out Overnight Oximetry on CPAP      Please Fax results to: [phone]            Please use [address] [facility] [employer] - [address] [place] [address]  Phone: [unfilled]    Patient Name: Maria G Gonzalez  : 1945  Gender: female  Address: [address] [place] [id number]  Last 4 digits of SSN: [unfilled]    [facility] Insurance: Payor: Rafael Maria / Plan: [employer] / Product Type: *No Product type* /   Subscriber ID: [id number] - (Medicare Managed)      AMB Supply Order  Order Details     DME Location:[facility] machine, please check SHERI on CPAP in one month    Order Date: 2023   There were no encounter diagnoses.           (  X   )New Set-Up      machine   (     )[id number] CPAP Unit  (  x   ) Auto CPAP Unit  (     ) BiLevel Unit  (     ) Auto BiLevel Unit  (     ) ASV         Length of need: 12 months    Pressure: 5-15  cmH20  EPR: 2  Ramp Time:  20 min    Starting Ramp Pressure:  4  cm H20    Patient had a diagnostic Apnea Hypopnea Index (AHI) of :

## 2023-09-08 ENCOUNTER — OFFICE VISIT (OUTPATIENT)
Dept: SLEEP MEDICINE | Age: 78
End: 2023-09-08
Payer: MEDICARE

## 2023-09-08 VITALS
SYSTOLIC BLOOD PRESSURE: 138 MMHG | BODY MASS INDEX: 28.51 KG/M2 | WEIGHT: 151 LBS | OXYGEN SATURATION: 97 % | RESPIRATION RATE: 14 BRPM | DIASTOLIC BLOOD PRESSURE: 84 MMHG | HEART RATE: 68 BPM | HEIGHT: 61 IN

## 2023-09-08 DIAGNOSIS — G47.33 OSA (OBSTRUCTIVE SLEEP APNEA): Primary | ICD-10-CM

## 2023-09-08 DIAGNOSIS — G47.34 NOCTURNAL HYPOXEMIA: ICD-10-CM

## 2023-09-08 PROCEDURE — 1123F ACP DISCUSS/DSCN MKR DOCD: CPT | Performed by: PHYSICIAN ASSISTANT

## 2023-09-08 PROCEDURE — 3075F SYST BP GE 130 - 139MM HG: CPT | Performed by: PHYSICIAN ASSISTANT

## 2023-09-08 PROCEDURE — 1036F TOBACCO NON-USER: CPT | Performed by: PHYSICIAN ASSISTANT

## 2023-09-08 PROCEDURE — 1090F PRES/ABSN URINE INCON ASSESS: CPT | Performed by: PHYSICIAN ASSISTANT

## 2023-09-08 PROCEDURE — G8427 DOCREV CUR MEDS BY ELIG CLIN: HCPCS | Performed by: PHYSICIAN ASSISTANT

## 2023-09-08 PROCEDURE — 99203 OFFICE O/P NEW LOW 30 MIN: CPT | Performed by: PHYSICIAN ASSISTANT

## 2023-09-08 PROCEDURE — G8399 PT W/DXA RESULTS DOCUMENT: HCPCS | Performed by: PHYSICIAN ASSISTANT

## 2023-09-08 PROCEDURE — G8417 CALC BMI ABV UP PARAM F/U: HCPCS | Performed by: PHYSICIAN ASSISTANT

## 2023-09-08 PROCEDURE — 3079F DIAST BP 80-89 MM HG: CPT | Performed by: PHYSICIAN ASSISTANT

## 2023-09-08 ASSESSMENT — SLEEP AND FATIGUE QUESTIONNAIRES
HOW LIKELY ARE YOU TO NOD OFF OR FALL ASLEEP WHILE WATCHING TV: 2
HOW LIKELY ARE YOU TO NOD OFF OR FALL ASLEEP WHILE SITTING INACTIVE IN A PUBLIC PLACE: 0
HOW LIKELY ARE YOU TO NOD OFF OR FALL ASLEEP IN A CAR, WHILE STOPPED FOR A FEW MINUTES IN TRAFFIC: 0
HOW LIKELY ARE YOU TO NOD OFF OR FALL ASLEEP WHILE SITTING AND READING: 3
HOW LIKELY ARE YOU TO NOD OFF OR FALL ASLEEP WHEN YOU ARE A PASSENGER IN A CAR FOR AN HOUR WITHOUT A BREAK: 1
HOW LIKELY ARE YOU TO NOD OFF OR FALL ASLEEP WHILE LYING DOWN TO REST IN THE AFTERNOON WHEN CIRCUMSTANCES PERMIT: 1
HOW LIKELY ARE YOU TO NOD OFF OR FALL ASLEEP WHILE SITTING AND TALKING TO SOMEONE: 0
ESS TOTAL SCORE: 8
HOW LIKELY ARE YOU TO NOD OFF OR FALL ASLEEP WHILE SITTING QUIETLY AFTER LUNCH WITHOUT ALCOHOL: 1

## 2023-09-08 NOTE — PATIENT INSTRUCTIONS
The company who will be taking care of your CPAP supplies is:     Address: [address] [place] [address] [address]  Phone: [phone] Option #1  Fax: [phone]

## 2023-09-11 ENCOUNTER — OFFICE VISIT (OUTPATIENT)
Dept: PULMONOLOGY | Age: 78
End: 2023-09-11
Payer: MEDICARE

## 2023-09-11 VITALS
DIASTOLIC BLOOD PRESSURE: 78 MMHG | TEMPERATURE: 96 F | BODY MASS INDEX: 28.51 KG/M2 | HEIGHT: 61 IN | HEART RATE: 70 BPM | RESPIRATION RATE: 18 BRPM | OXYGEN SATURATION: 100 % | SYSTOLIC BLOOD PRESSURE: 126 MMHG | WEIGHT: 151 LBS

## 2023-09-11 DIAGNOSIS — G47.33 OSA (OBSTRUCTIVE SLEEP APNEA): ICD-10-CM

## 2023-09-11 DIAGNOSIS — Z86.711 HX OF PULMONARY EMBOLUS: ICD-10-CM

## 2023-09-11 DIAGNOSIS — R06.02 SOB (SHORTNESS OF BREATH): Primary | ICD-10-CM

## 2023-09-11 DIAGNOSIS — J30.89 ENVIRONMENTAL AND SEASONAL ALLERGIES: ICD-10-CM

## 2023-09-11 PROCEDURE — 99214 OFFICE O/P EST MOD 30 MIN: CPT | Performed by: NURSE PRACTITIONER

## 2023-09-11 PROCEDURE — G8427 DOCREV CUR MEDS BY ELIG CLIN: HCPCS | Performed by: NURSE PRACTITIONER

## 2023-09-11 PROCEDURE — 3078F DIAST BP <80 MM HG: CPT | Performed by: NURSE PRACTITIONER

## 2023-09-11 PROCEDURE — G8417 CALC BMI ABV UP PARAM F/U: HCPCS | Performed by: NURSE PRACTITIONER

## 2023-09-11 PROCEDURE — 3074F SYST BP LT 130 MM HG: CPT | Performed by: NURSE PRACTITIONER

## 2023-09-11 PROCEDURE — 1036F TOBACCO NON-USER: CPT | Performed by: NURSE PRACTITIONER

## 2023-09-11 PROCEDURE — G8399 PT W/DXA RESULTS DOCUMENT: HCPCS | Performed by: NURSE PRACTITIONER

## 2023-09-11 PROCEDURE — 1090F PRES/ABSN URINE INCON ASSESS: CPT | Performed by: NURSE PRACTITIONER

## 2023-09-11 PROCEDURE — 1123F ACP DISCUSS/DSCN MKR DOCD: CPT | Performed by: NURSE PRACTITIONER

## 2023-09-11 NOTE — PROGRESS NOTES
(MAG-OX) 400 mg, Oral, DAILY    metFORMIN (GLUCOPHAGE) 500 mg, Oral, 2 TIMES DAILY WITH MEALS    omeprazole (PRILOSEC) 40 mg, Oral, DAILY    ondansetron (ZOFRAN-ODT) 4 mg, SubLINGual, 3 TIMES DAILY PRN    pantoprazole (PROTONIX) 40 mg, Oral, DAILY    potassium chloride (KLOR-CON M) 20 MEQ extended release tablet 10 mEq, Oral, 2 TIMES DAILY, TAKE 1 TABLET BY MOUTH TWICE DAILY    rosuvastatin (CRESTOR) 10 mg, Oral, DAILY, TAKE 1 TABLET BY MOUTH EVERY NIGHT    timolol (TIMOPTIC) 0.25 % ophthalmic solution INSTILL 1 DROP IN BOTH EYES TWICE DAILY    triamcinolone (KENALOG) 0.025 % ointment Topical, 2 TIMES DAILY    ustekinumab (STELARA) 130 MG/26ML SOLN IntraVENous    Vitamin D3 2,000 Units, Oral, DAILY

## 2023-09-18 ENCOUNTER — OFFICE VISIT (OUTPATIENT)
Dept: FAMILY MEDICINE CLINIC | Facility: CLINIC | Age: 78
End: 2023-09-18
Payer: MEDICARE

## 2023-09-18 VITALS
HEIGHT: 61 IN | OXYGEN SATURATION: 98 % | SYSTOLIC BLOOD PRESSURE: 132 MMHG | BODY MASS INDEX: 28.77 KG/M2 | WEIGHT: 152.4 LBS | DIASTOLIC BLOOD PRESSURE: 60 MMHG | HEART RATE: 66 BPM | TEMPERATURE: 98 F

## 2023-09-18 DIAGNOSIS — N30.00 ACUTE CYSTITIS WITHOUT HEMATURIA: ICD-10-CM

## 2023-09-18 DIAGNOSIS — E11.21 TYPE 2 DIABETES MELLITUS WITH NEPHROPATHY (HCC): ICD-10-CM

## 2023-09-18 DIAGNOSIS — I10 ESSENTIAL HYPERTENSION: ICD-10-CM

## 2023-09-18 DIAGNOSIS — Z23 ENCOUNTER FOR IMMUNIZATION: ICD-10-CM

## 2023-09-18 DIAGNOSIS — E03.9 ACQUIRED HYPOTHYROIDISM: ICD-10-CM

## 2023-09-18 DIAGNOSIS — R30.9 PAINFUL URINATION: ICD-10-CM

## 2023-09-18 DIAGNOSIS — E78.2 MIXED HYPERLIPIDEMIA: ICD-10-CM

## 2023-09-18 DIAGNOSIS — E55.9 VITAMIN D DEFICIENCY: ICD-10-CM

## 2023-09-18 DIAGNOSIS — N18.31 STAGE 3A CHRONIC KIDNEY DISEASE (HCC): Primary | ICD-10-CM

## 2023-09-18 LAB
25(OH)D3 SERPL-MCNC: 35.8 NG/ML (ref 30–100)
BASOPHILS # BLD: 0.1 K/UL (ref 0–0.2)
BASOPHILS NFR BLD: 1 % (ref 0–2)
BILIRUBIN, URINE, POC: NEGATIVE
BLOOD URINE, POC: NORMAL
CHOLEST SERPL-MCNC: 148 MG/DL
DIFFERENTIAL METHOD BLD: ABNORMAL
EOSINOPHIL # BLD: 0.2 K/UL (ref 0–0.8)
EOSINOPHIL NFR BLD: 4 % (ref 0.5–7.8)
ERYTHROCYTE [DISTWIDTH] IN BLOOD BY AUTOMATED COUNT: 13.7 % (ref 11.9–14.6)
GLUCOSE URINE, POC: NEGATIVE
HBA1C MFR BLD: 6.2 %
HCT VFR BLD AUTO: 36.8 % (ref 35.8–46.3)
HDLC SERPL-MCNC: 53 MG/DL (ref 40–60)
HDLC SERPL: 2.8
HGB BLD-MCNC: 11.4 G/DL (ref 11.7–15.4)
IMM GRANULOCYTES # BLD AUTO: 0 K/UL (ref 0–0.5)
IMM GRANULOCYTES NFR BLD AUTO: 0 % (ref 0–5)
KETONES, URINE, POC: NEGATIVE
LDLC SERPL CALC-MCNC: 76 MG/DL
LEUKOCYTE ESTERASE, URINE, POC: NORMAL
LYMPHOCYTES # BLD: 2.8 K/UL (ref 0.5–4.6)
LYMPHOCYTES NFR BLD: 43 % (ref 13–44)
MCH RBC QN AUTO: 27.4 PG (ref 26.1–32.9)
MCHC RBC AUTO-ENTMCNC: 31 G/DL (ref 31.4–35)
MCV RBC AUTO: 88.5 FL (ref 82–102)
MONOCYTES # BLD: 0.6 K/UL (ref 0.1–1.3)
MONOCYTES NFR BLD: 9 % (ref 4–12)
NEUTS SEG # BLD: 2.7 K/UL (ref 1.7–8.2)
NEUTS SEG NFR BLD: 43 % (ref 43–78)
NITRITE, URINE, POC: NEGATIVE
NRBC # BLD: 0 K/UL (ref 0–0.2)
PH, URINE, POC: 7 (ref 4.6–8)
PLATELET # BLD AUTO: 204 K/UL (ref 150–450)
PMV BLD AUTO: 12.7 FL (ref 9.4–12.3)
PROTEIN,URINE, POC: NEGATIVE
RBC # BLD AUTO: 4.16 M/UL (ref 4.05–5.2)
SPECIFIC GRAVITY, URINE, POC: 1.02 (ref 1–1.03)
TRIGL SERPL-MCNC: 95 MG/DL (ref 35–150)
URINALYSIS CLARITY, POC: NORMAL
URINALYSIS COLOR, POC: YELLOW
UROBILINOGEN, POC: NORMAL
VLDLC SERPL CALC-MCNC: 19 MG/DL (ref 6–23)
WBC # BLD AUTO: 6.4 K/UL (ref 4.3–11.1)

## 2023-09-18 PROCEDURE — G8417 CALC BMI ABV UP PARAM F/U: HCPCS | Performed by: FAMILY MEDICINE

## 2023-09-18 PROCEDURE — 1123F ACP DISCUSS/DSCN MKR DOCD: CPT | Performed by: FAMILY MEDICINE

## 2023-09-18 PROCEDURE — G8427 DOCREV CUR MEDS BY ELIG CLIN: HCPCS | Performed by: FAMILY MEDICINE

## 2023-09-18 PROCEDURE — 90694 VACC AIIV4 NO PRSRV 0.5ML IM: CPT | Performed by: FAMILY MEDICINE

## 2023-09-18 PROCEDURE — 99214 OFFICE O/P EST MOD 30 MIN: CPT | Performed by: FAMILY MEDICINE

## 2023-09-18 PROCEDURE — G8399 PT W/DXA RESULTS DOCUMENT: HCPCS | Performed by: FAMILY MEDICINE

## 2023-09-18 PROCEDURE — G0008 ADMIN INFLUENZA VIRUS VAC: HCPCS | Performed by: FAMILY MEDICINE

## 2023-09-18 PROCEDURE — 3074F SYST BP LT 130 MM HG: CPT | Performed by: FAMILY MEDICINE

## 2023-09-18 PROCEDURE — 1090F PRES/ABSN URINE INCON ASSESS: CPT | Performed by: FAMILY MEDICINE

## 2023-09-18 PROCEDURE — 1036F TOBACCO NON-USER: CPT | Performed by: FAMILY MEDICINE

## 2023-09-18 PROCEDURE — 83036 HEMOGLOBIN GLYCOSYLATED A1C: CPT | Performed by: FAMILY MEDICINE

## 2023-09-18 PROCEDURE — 3078F DIAST BP <80 MM HG: CPT | Performed by: FAMILY MEDICINE

## 2023-09-18 PROCEDURE — 81003 URINALYSIS AUTO W/O SCOPE: CPT | Performed by: FAMILY MEDICINE

## 2023-09-18 ASSESSMENT — ENCOUNTER SYMPTOMS
WHEEZING: 0
BLOOD IN STOOL: 0
DIARRHEA: 1
CHEST TIGHTNESS: 0

## 2023-09-18 ASSESSMENT — PATIENT HEALTH QUESTIONNAIRE - PHQ9
SUM OF ALL RESPONSES TO PHQ QUESTIONS 1-9: 0
SUM OF ALL RESPONSES TO PHQ9 QUESTIONS 1 & 2: 0
2. FEELING DOWN, DEPRESSED OR HOPELESS: 0
1. LITTLE INTEREST OR PLEASURE IN DOING THINGS: 0
SUM OF ALL RESPONSES TO PHQ QUESTIONS 1-9: 0

## 2023-09-18 NOTE — PROGRESS NOTES
Physical Exam  Vitals reviewed. Constitutional:       General: She is not in acute distress. Appearance: Normal appearance. She is not ill-appearing. Eyes:      General: No scleral icterus. Conjunctiva/sclera: Conjunctivae normal.   Neck:      Vascular: No carotid bruit. Cardiovascular:      Rate and Rhythm: Normal rate and regular rhythm. Heart sounds: Normal heart sounds. No murmur heard. Pulmonary:      Effort: Pulmonary effort is normal. No respiratory distress. Breath sounds: Normal breath sounds. No wheezing. Abdominal:      General: There is no distension. Musculoskeletal:      Cervical back: Neck supple. Right lower leg: No edema. Left lower leg: No edema. Skin:     General: Skin is warm and dry. Neurological:      General: No focal deficit present. Mental Status: She is oriented to person, place, and time. Psychiatric:         Mood and Affect: Mood normal.         Behavior: Behavior normal.          Results for orders placed or performed in visit on 09/18/23   AMB POC HEMOGLOBIN A1C   Result Value Ref Range    Hemoglobin A1C, POC 6.2 %   AMB POC URINALYSIS DIP STICK AUTO W/O MICRO   Result Value Ref Range    Color, Urine, POC Yellow     Clarity, Urine, POC Cloudy     Glucose, Urine, POC Negative Negative    Bilirubin, Urine, POC Negative Negative    Ketones, Urine, POC Negative Negative    Specific Gravity, Urine, POC 1.020 1.001 - 1.035    Blood, Urine, POC Trace-intact Negative    pH, Urine, POC 7.0 4.6 - 8.0    Protein, Urine, POC Negative Negative    Urobilinogen, POC 0.2 mg/dL     Nitrite, Urine, POC Negative Negative    Leukocyte Esterase, Urine, POC 3+ Negative        1. Stage 3a chronic kidney disease ([address])  Assessment & Plan:  Advised to avoid NSAIDs, maintain healthy blood pressure, importance of blood sugar control, and adequate fluid intake to preserve renal function.   All medications reviewed and appropriately dose for chronic kidney

## 2023-09-19 ENCOUNTER — TELEPHONE (OUTPATIENT)
Dept: SLEEP MEDICINE | Age: 78
End: 2023-09-19

## 2023-09-19 LAB
ALBUMIN SERPL-MCNC: 3.6 G/DL (ref 3.2–4.6)
ALBUMIN/GLOB SERPL: 0.9 (ref 0.4–1.6)
ALP SERPL-CCNC: 70 U/L (ref 50–136)
ALT SERPL-CCNC: 14 U/L (ref 12–65)
ANION GAP SERPL CALC-SCNC: 6 MMOL/L (ref 2–11)
AST SERPL-CCNC: 17 U/L (ref 15–37)
BILIRUB SERPL-MCNC: 0.3 MG/DL (ref 0.2–1.1)
BUN SERPL-MCNC: 18 MG/DL (ref 8–23)
CALCIUM SERPL-MCNC: 8.9 MG/DL (ref 8.3–10.4)
CHLORIDE SERPL-SCNC: 108 MMOL/L (ref 101–110)
CO2 SERPL-SCNC: 29 MMOL/L (ref 21–32)
CREAT SERPL-MCNC: 1.1 MG/DL (ref 0.6–1)
GLOBULIN SER CALC-MCNC: 4 G/DL (ref 2.8–4.5)
GLUCOSE SERPL-MCNC: 108 MG/DL (ref 65–100)
POTASSIUM SERPL-SCNC: 4.2 MMOL/L (ref 3.5–5.1)
PROT SERPL-MCNC: 7.6 G/DL (ref 6.3–8.2)
SODIUM SERPL-SCNC: 143 MMOL/L (ref 133–143)
TSH W FREE THYROID IF ABNORMAL: 0.69 UIU/ML (ref 0.36–3.74)

## 2023-09-19 RX ORDER — CEPHALEXIN 500 MG/1
500 CAPSULE ORAL 2 TIMES DAILY
Qty: 14 CAPSULE | Refills: 0 | Status: SHIPPED | OUTPATIENT
Start: 2023-09-19 | End: 2023-09-26

## 2023-09-19 NOTE — ASSESSMENT & PLAN NOTE
Reports compliance with medication. Is taking daily on an empty stomach. Follow up on TSH and adjust dose if needed.

## 2023-09-19 NOTE — TELEPHONE ENCOUNTER
Geneva Mcdaniels has her order . It has not been processed yet. Contact pt and she is aware. I gave her the number to  Ider Acampo.

## 2023-09-21 LAB
BACTERIA SPEC CULT: ABNORMAL
SERVICE CMNT-IMP: ABNORMAL

## 2023-09-22 DIAGNOSIS — Z12.31 SCREENING MAMMOGRAM, ENCOUNTER FOR: ICD-10-CM

## 2023-12-04 ENCOUNTER — NURSE TRIAGE (OUTPATIENT)
Dept: OTHER | Facility: CLINIC | Age: 78
End: 2023-12-04

## 2023-12-04 NOTE — TELEPHONE ENCOUNTER
Location of [place]: [place]    Received call from [address] [place] [address] at [facility] with Red Flag Complaint. Subjective: Caller states \"been feeling pretty run down\"     Current Symptoms: increased fatigue    Onset: 3-4 weeks ago; unchanged    Associated Symptoms: reduced activity, increased sleepiness    Pain Severity: 0/10; N/A; none    Temperature: no fevers     What has been tried: NA    LMP: NA Pregnant: NA    Recommended disposition: See PCP within 3 Days    Care advice provided, patient verbalizes understanding; denies any other questions or concerns; instructed to call back for any new or worsening symptoms. Patient/Caller agrees with recommended disposition; writer provided warm transfer to [facility] at [facility] for appointment scheduling    Attention Provider: Thank you for allowing me to participate in the care of your patient. The patient was connected to triage in response to information provided to the ECC/PSC. Please do not respond through this encounter as the response is not directed to a shared pool.       Reason for Disposition   Fatigue (i.e., tires easily, decreased energy) and persists > 1 week    Protocols used: Weakness (Generalized) and Fatigue-ADULT-OH

## 2023-12-08 ENCOUNTER — OFFICE VISIT (OUTPATIENT)
Dept: FAMILY MEDICINE CLINIC | Facility: CLINIC | Age: 78
End: 2023-12-08
Payer: MEDICARE

## 2023-12-08 VITALS
BODY MASS INDEX: 28.7 KG/M2 | DIASTOLIC BLOOD PRESSURE: 66 MMHG | SYSTOLIC BLOOD PRESSURE: 128 MMHG | TEMPERATURE: 97.9 F | HEART RATE: 76 BPM | HEIGHT: 61 IN | OXYGEN SATURATION: 98 % | WEIGHT: 152 LBS

## 2023-12-08 DIAGNOSIS — E53.8 B12 DEFICIENCY: ICD-10-CM

## 2023-12-08 DIAGNOSIS — K51.018: ICD-10-CM

## 2023-12-08 DIAGNOSIS — R53.83 FATIGUE, UNSPECIFIED TYPE: ICD-10-CM

## 2023-12-08 DIAGNOSIS — R39.9 URINARY TRACT INFECTION SYMPTOMS: Primary | ICD-10-CM

## 2023-12-08 DIAGNOSIS — R00.2 PALPITATIONS: ICD-10-CM

## 2023-12-08 DIAGNOSIS — E55.9 VITAMIN D DEFICIENCY: ICD-10-CM

## 2023-12-08 DIAGNOSIS — R68.89 OTHER GENERAL SYMPTOMS AND SIGNS: ICD-10-CM

## 2023-12-08 LAB
25(OH)D3 SERPL-MCNC: 28.8 NG/ML (ref 30–100)
ALBUMIN SERPL-MCNC: 3.6 G/DL (ref 3.2–4.6)
ALBUMIN/GLOB SERPL: 0.9 (ref 0.4–1.6)
ALP SERPL-CCNC: 68 U/L (ref 50–136)
ALT SERPL-CCNC: 16 U/L (ref 12–65)
ANION GAP SERPL CALC-SCNC: 7 MMOL/L (ref 2–11)
AST SERPL-CCNC: 26 U/L (ref 15–37)
BASOPHILS # BLD: 0.1 K/UL (ref 0–0.2)
BASOPHILS NFR BLD: 1 % (ref 0–2)
BILIRUB SERPL-MCNC: 0.2 MG/DL (ref 0.2–1.1)
BILIRUBIN, URINE, POC: NEGATIVE
BLOOD URINE, POC: NEGATIVE
BUN SERPL-MCNC: 17 MG/DL (ref 8–23)
CALCIUM SERPL-MCNC: 9.4 MG/DL (ref 8.3–10.4)
CHLORIDE SERPL-SCNC: 110 MMOL/L (ref 103–113)
CREAT SERPL-MCNC: 1.2 MG/DL (ref 0.6–1)
DIFFERENTIAL METHOD BLD: ABNORMAL
EOSINOPHIL # BLD: 0.2 K/UL (ref 0–0.8)
EOSINOPHIL NFR BLD: 3 % (ref 0.5–7.8)
ERYTHROCYTE [DISTWIDTH] IN BLOOD BY AUTOMATED COUNT: 13.7 % (ref 11.9–14.6)
FERRITIN SERPL-MCNC: 124 NG/ML (ref 8–388)
GLOBULIN SER CALC-MCNC: 3.9 G/DL (ref 2.8–4.5)
GLUCOSE SERPL-MCNC: 96 MG/DL (ref 65–100)
GLUCOSE URINE, POC: NEGATIVE
HCT VFR BLD AUTO: 37.3 % (ref 35.8–46.3)
HGB BLD-MCNC: 11.6 G/DL (ref 11.7–15.4)
IMM GRANULOCYTES # BLD AUTO: 0 K/UL (ref 0–0.5)
IMM GRANULOCYTES NFR BLD AUTO: 0 % (ref 0–5)
KETONES, URINE, POC: NEGATIVE
LEUKOCYTE ESTERASE, URINE, POC: NEGATIVE
LYMPHOCYTES # BLD: 3 K/UL (ref 0.5–4.6)
LYMPHOCYTES NFR BLD: 47 % (ref 13–44)
MCH RBC QN AUTO: 27.2 PG (ref 26.1–32.9)
MCHC RBC AUTO-ENTMCNC: 31.1 G/DL (ref 31.4–35)
MCV RBC AUTO: 87.4 FL (ref 82–102)
MONOCYTES # BLD: 0.6 K/UL (ref 0.1–1.3)
MONOCYTES NFR BLD: 9 % (ref 4–12)
NEUTS SEG # BLD: 2.6 K/UL (ref 1.7–8.2)
NEUTS SEG NFR BLD: 40 % (ref 43–78)
NITRITE, URINE, POC: NEGATIVE
NRBC # BLD: 0 K/UL (ref 0–0.2)
PH, URINE, POC: 5.5 (ref 4.6–8)
PMV BLD AUTO: 12.4 FL (ref 9.4–12.3)
POTASSIUM SERPL-SCNC: 4.1 MMOL/L (ref 3.5–5.1)
PROT SERPL-MCNC: 7.5 G/DL (ref 6.3–8.2)
PROTEIN,URINE, POC: NEGATIVE
RBC # BLD AUTO: 4.27 M/UL (ref 4.05–5.2)
SODIUM SERPL-SCNC: 142 MMOL/L (ref 136–146)
SPECIFIC GRAVITY, URINE, POC: 1.01 (ref 1–1.03)
TSH, 3RD GENERATION: 1.1 UIU/ML (ref 0.36–3.74)
URINALYSIS CLARITY, POC: CLEAR
URINALYSIS COLOR, POC: YELLOW
UROBILINOGEN, POC: NORMAL
VIT B12 SERPL-MCNC: 194 PG/ML (ref 193–986)
WBC # BLD AUTO: 6.4 K/UL (ref 4.3–11.1)

## 2023-12-08 PROCEDURE — G8399 PT W/DXA RESULTS DOCUMENT: HCPCS | Performed by: FAMILY MEDICINE

## 2023-12-08 PROCEDURE — 3078F DIAST BP <80 MM HG: CPT | Performed by: FAMILY MEDICINE

## 2023-12-08 PROCEDURE — 1090F PRES/ABSN URINE INCON ASSESS: CPT | Performed by: FAMILY MEDICINE

## 2023-12-08 PROCEDURE — G8417 CALC BMI ABV UP PARAM F/U: HCPCS | Performed by: FAMILY MEDICINE

## 2023-12-08 PROCEDURE — 1123F ACP DISCUSS/DSCN MKR DOCD: CPT | Performed by: FAMILY MEDICINE

## 2023-12-08 PROCEDURE — 1036F TOBACCO NON-USER: CPT | Performed by: FAMILY MEDICINE

## 2023-12-08 PROCEDURE — 3074F SYST BP LT 130 MM HG: CPT | Performed by: FAMILY MEDICINE

## 2023-12-08 PROCEDURE — 99214 OFFICE O/P EST MOD 30 MIN: CPT | Performed by: FAMILY MEDICINE

## 2023-12-08 PROCEDURE — 93000 ELECTROCARDIOGRAM COMPLETE: CPT | Performed by: FAMILY MEDICINE

## 2023-12-08 PROCEDURE — G8484 FLU IMMUNIZE NO ADMIN: HCPCS | Performed by: FAMILY MEDICINE

## 2023-12-08 PROCEDURE — 81003 URINALYSIS AUTO W/O SCOPE: CPT | Performed by: FAMILY MEDICINE

## 2023-12-08 PROCEDURE — G8428 CUR MEDS NOT DOCUMENT: HCPCS | Performed by: FAMILY MEDICINE

## 2023-12-08 RX ORDER — DORZOLAMIDE HCL 20 MG/ML
SOLUTION/ DROPS OPHTHALMIC
COMMUNITY
Start: 2023-09-29

## 2023-12-08 ASSESSMENT — PATIENT HEALTH QUESTIONNAIRE - PHQ9
SUM OF ALL RESPONSES TO PHQ QUESTIONS 1-9: 1
SUM OF ALL RESPONSES TO PHQ9 QUESTIONS 1 & 2: 1
2. FEELING DOWN, DEPRESSED OR HOPELESS: 1
SUM OF ALL RESPONSES TO PHQ QUESTIONS 1-9: 1
SUM OF ALL RESPONSES TO PHQ QUESTIONS 1-9: 1
1. LITTLE INTEREST OR PLEASURE IN DOING THINGS: 0
SUM OF ALL RESPONSES TO PHQ QUESTIONS 1-9: 1

## 2023-12-08 ASSESSMENT — ENCOUNTER SYMPTOMS
DIARRHEA: 0
ABDOMINAL PAIN: 0
CHEST TIGHTNESS: 0
CONSTIPATION: 0
WHEEZING: 0
NAUSEA: 0
COUGH: 0
SHORTNESS OF BREATH: 0

## 2023-12-08 NOTE — PROGRESS NOTES
Nuvia Mace is a 78 y.o. female who presents today for the following:  Chief Complaint   Patient presents with    Fatigue     Increased fatigue x3 weeks       Allergies   Allergen Reactions    Enoxaparin Other (See Comments)     HIT+    Heparin (Porcine) Other (See Comments)     HIT    Morphine Nausea Only     Nausea and stomach pains    Sulfa Antibiotics Nausea And Vomiting       Current Outpatient Medications   Medication Sig Dispense Refill    albuterol sulfate HFA (VENTOLIN HFA) 108 (90 Base) MCG/ACT inhaler Inhale 2 puffs into the lungs 4 times daily as needed for Wheezing 18 g 3    losartan (COZAAR) 50 MG tablet Take 1 tablet by mouth 2 times daily 90 tablet 2    metFORMIN (GLUCOPHAGE) 500 MG tablet Take 1 tablet by mouth 2 times daily (with meals) 180 tablet 2    levothyroxine (SYNTHROID) 50 MCG tablet Take 1 tablet by mouth Daily TAKE 1 TABLET BY MOUTH DAILY BEFORE BREAKFAST 90 tablet 2    magnesium oxide (MAG-OX) 400 (240 Mg) MG tablet Take 1 tablet by mouth daily 90 tablet 2    Cholecalciferol (VITAMIN D3) 50 MCG (2000 UT) CAPS Take 1 capsule by mouth daily 90 capsule 2    fluticasone (FLONASE) 50 MCG/ACT nasal spray 2 sprays by Nasal route daily 16 g 5    amLODIPine (NORVASC) 10 MG tablet Take 1 tablet by mouth daily TAKE 1 TABLET BY MOUTH DAILY 90 tablet 2    rosuvastatin (CRESTOR) 10 MG tablet Take 1 tablet by mouth daily TAKE 1 TABLET BY MOUTH EVERY NIGHT 90 tablet 2    potassium chloride (KLOR-CON M) 20 MEQ extended release tablet Take 0.5 tablets by mouth 2 times daily TAKE 1 TABLET BY MOUTH TWICE DAILY 180 tablet 2    pantoprazole (PROTONIX) 40 MG tablet Take 1 tablet by mouth daily      omeprazole (PRILOSEC) 40 MG delayed release capsule Take 1 capsule by mouth daily      polycarbophil (FIBERCON) 625 MG tablet Take 2 tablets by mouth daily (Patient not taking: Reported on 9/11/2023)      timolol (TIMOPTIC) 0.25 % ophthalmic solution INSTILL 1 DROP IN BOTH EYES TWICE DAILY      acetaminophen

## 2023-12-10 RX ORDER — LANOLIN ALCOHOL/MO/W.PET/CERES
1000 CREAM (GRAM) TOPICAL DAILY
Qty: 90 TABLET | Refills: 3 | Status: SHIPPED | OUTPATIENT
Start: 2023-12-10

## 2024-01-02 ENCOUNTER — TELEPHONE (OUTPATIENT)
Dept: FAMILY MEDICINE CLINIC | Facility: CLINIC | Age: 79
End: 2024-01-02

## 2024-01-02 NOTE — TELEPHONE ENCOUNTER
Per Dr. Olmstead, a 7-day Zio / holter monitor is needed. Will call insurance to obtain approval.

## 2024-01-04 NOTE — TELEPHONE ENCOUNTER
Called [facility] and spoke to Abby, who states that a pre-cert for a holter is not required. Gave reference number of [id number].     Called patient, notifying her of above. She verbalized understanding & thanked. States that she has an appt tomorrow at 9:30 am for 7-day holter monitor placement.

## 2024-01-12 ENCOUNTER — TELEPHONE (OUTPATIENT)
Dept: PULMONOLOGY | Age: 79
End: 2024-01-12

## 2024-01-12 NOTE — TELEPHONE ENCOUNTER
I called and spoke with patient to let her know that I have received results from [facility]. I told her once I heard something I or Bhakti would reach out to her. Marcela olivia

## 2024-01-18 ENCOUNTER — OFFICE VISIT (OUTPATIENT)
Dept: FAMILY MEDICINE CLINIC | Facility: CLINIC | Age: 79
End: 2024-01-18
Payer: MEDICARE

## 2024-01-18 VITALS
BODY MASS INDEX: 28.7 KG/M2 | HEIGHT: 61 IN | WEIGHT: 152 LBS | TEMPERATURE: 98.3 F | HEART RATE: 70 BPM | SYSTOLIC BLOOD PRESSURE: 134 MMHG | OXYGEN SATURATION: 96 % | DIASTOLIC BLOOD PRESSURE: 72 MMHG

## 2024-01-18 DIAGNOSIS — E55.9 VITAMIN D DEFICIENCY: ICD-10-CM

## 2024-01-18 DIAGNOSIS — E53.8 B12 DEFICIENCY: ICD-10-CM

## 2024-01-18 DIAGNOSIS — N18.31 STAGE 3A CHRONIC KIDNEY DISEASE (HCC): ICD-10-CM

## 2024-01-18 DIAGNOSIS — E78.2 MIXED HYPERLIPIDEMIA: ICD-10-CM

## 2024-01-18 DIAGNOSIS — I10 ESSENTIAL HYPERTENSION: ICD-10-CM

## 2024-01-18 DIAGNOSIS — K51.018: ICD-10-CM

## 2024-01-18 DIAGNOSIS — Z00.00 MEDICARE ANNUAL WELLNESS VISIT, SUBSEQUENT: Primary | ICD-10-CM

## 2024-01-18 DIAGNOSIS — E11.21 TYPE 2 DIABETES MELLITUS WITH NEPHROPATHY (HCC): ICD-10-CM

## 2024-01-18 DIAGNOSIS — E03.9 ACQUIRED HYPOTHYROIDISM: ICD-10-CM

## 2024-01-18 DIAGNOSIS — E83.42 HYPOMAGNESEMIA: ICD-10-CM

## 2024-01-18 DIAGNOSIS — J30.9 ALLERGIC RHINITIS, UNSPECIFIED SEASONALITY, UNSPECIFIED TRIGGER: ICD-10-CM

## 2024-01-18 LAB
25(OH)D3 SERPL-MCNC: 37.6 NG/ML (ref 30–100)
ALBUMIN SERPL-MCNC: 3.4 G/DL (ref 3.2–4.6)
ALBUMIN/GLOB SERPL: 0.8 (ref 0.4–1.6)
ALP SERPL-CCNC: 69 U/L (ref 50–136)
ALT SERPL-CCNC: 18 U/L (ref 12–65)
ANION GAP SERPL CALC-SCNC: 7 MMOL/L (ref 2–11)
AST SERPL-CCNC: 16 U/L (ref 15–37)
BASOPHILS # BLD: 0.1 K/UL (ref 0–0.2)
BASOPHILS NFR BLD: 1 % (ref 0–2)
BILIRUB SERPL-MCNC: 0.2 MG/DL (ref 0.2–1.1)
BUN SERPL-MCNC: 18 MG/DL (ref 8–23)
CALCIUM SERPL-MCNC: 10.2 MG/DL (ref 8.3–10.4)
CHLORIDE SERPL-SCNC: 109 MMOL/L (ref 103–113)
CO2 SERPL-SCNC: 23 MMOL/L (ref 21–32)
CREAT SERPL-MCNC: 1 MG/DL (ref 0.6–1)
DIFFERENTIAL METHOD BLD: ABNORMAL
EOSINOPHIL # BLD: 0.1 K/UL (ref 0–0.8)
EOSINOPHIL NFR BLD: 2 % (ref 0.5–7.8)
ERYTHROCYTE [DISTWIDTH] IN BLOOD BY AUTOMATED COUNT: 13.8 % (ref 11.9–14.6)
GLOBULIN SER CALC-MCNC: 4.3 G/DL (ref 2.8–4.5)
GLUCOSE SERPL-MCNC: 89 MG/DL (ref 65–100)
HBA1C MFR BLD: 6 %
HCT VFR BLD AUTO: 37.7 % (ref 35.8–46.3)
HGB BLD-MCNC: 11.6 G/DL (ref 11.7–15.4)
IMM GRANULOCYTES # BLD AUTO: 0 K/UL (ref 0–0.5)
IMM GRANULOCYTES NFR BLD AUTO: 0 % (ref 0–5)
LYMPHOCYTES # BLD: 2.2 K/UL (ref 0.5–4.6)
LYMPHOCYTES NFR BLD: 37 % (ref 13–44)
MCH RBC QN AUTO: 27.1 PG (ref 26.1–32.9)
MCHC RBC AUTO-ENTMCNC: 30.8 G/DL (ref 31.4–35)
MCV RBC AUTO: 88.1 FL (ref 82–102)
MONOCYTES # BLD: 0.5 K/UL (ref 0.1–1.3)
MONOCYTES NFR BLD: 8 % (ref 4–12)
NEUTS SEG # BLD: 3 K/UL (ref 1.7–8.2)
NEUTS SEG NFR BLD: 52 % (ref 43–78)
NRBC # BLD: 0 K/UL (ref 0–0.2)
PLATELET # BLD AUTO: 222 K/UL (ref 150–450)
PMV BLD AUTO: 12.1 FL (ref 9.4–12.3)
POTASSIUM SERPL-SCNC: 4 MMOL/L (ref 3.5–5.1)
PROT SERPL-MCNC: 7.7 G/DL (ref 6.3–8.2)
RBC # BLD AUTO: 4.28 M/UL (ref 4.05–5.2)
SODIUM SERPL-SCNC: 139 MMOL/L (ref 136–146)
TSH, 3RD GENERATION: 1.15 UIU/ML (ref 0.36–3.74)
VIT B12 SERPL-MCNC: 203 PG/ML (ref 193–986)
WBC # BLD AUTO: 5.8 K/UL (ref 4.3–11.1)

## 2024-01-18 PROCEDURE — 83036 HEMOGLOBIN GLYCOSYLATED A1C: CPT | Performed by: FAMILY MEDICINE

## 2024-01-18 PROCEDURE — 96372 THER/PROPH/DIAG INJ SC/IM: CPT | Performed by: FAMILY MEDICINE

## 2024-01-18 PROCEDURE — G0439 PPPS, SUBSEQ VISIT: HCPCS | Performed by: FAMILY MEDICINE

## 2024-01-18 PROCEDURE — 1036F TOBACCO NON-USER: CPT | Performed by: FAMILY MEDICINE

## 2024-01-18 PROCEDURE — G8399 PT W/DXA RESULTS DOCUMENT: HCPCS | Performed by: FAMILY MEDICINE

## 2024-01-18 PROCEDURE — 3075F SYST BP GE 130 - 139MM HG: CPT | Performed by: FAMILY MEDICINE

## 2024-01-18 PROCEDURE — 3078F DIAST BP <80 MM HG: CPT | Performed by: FAMILY MEDICINE

## 2024-01-18 PROCEDURE — G8428 CUR MEDS NOT DOCUMENT: HCPCS | Performed by: FAMILY MEDICINE

## 2024-01-18 PROCEDURE — G8417 CALC BMI ABV UP PARAM F/U: HCPCS | Performed by: FAMILY MEDICINE

## 2024-01-18 PROCEDURE — 1123F ACP DISCUSS/DSCN MKR DOCD: CPT | Performed by: FAMILY MEDICINE

## 2024-01-18 PROCEDURE — 1090F PRES/ABSN URINE INCON ASSESS: CPT | Performed by: FAMILY MEDICINE

## 2024-01-18 PROCEDURE — 99214 OFFICE O/P EST MOD 30 MIN: CPT | Performed by: FAMILY MEDICINE

## 2024-01-18 PROCEDURE — G8484 FLU IMMUNIZE NO ADMIN: HCPCS | Performed by: FAMILY MEDICINE

## 2024-01-18 RX ORDER — AMLODIPINE BESYLATE 10 MG/1
10 TABLET ORAL DAILY
Qty: 90 TABLET | Refills: 2 | Status: SHIPPED | OUTPATIENT
Start: 2024-01-18

## 2024-01-18 RX ORDER — CYANOCOBALAMIN 1000 UG/ML
1000 INJECTION, SOLUTION INTRAMUSCULAR; SUBCUTANEOUS ONCE
Status: COMPLETED | OUTPATIENT
Start: 2024-01-18 | End: 2024-01-18

## 2024-01-18 RX ORDER — ROSUVASTATIN CALCIUM 10 MG/1
10 TABLET, COATED ORAL DAILY
Qty: 90 TABLET | Refills: 2 | Status: SHIPPED | OUTPATIENT
Start: 2024-01-18

## 2024-01-18 RX ORDER — LANOLIN ALCOHOL/MO/W.PET/CERES
1000 CREAM (GRAM) TOPICAL DAILY
Qty: 90 TABLET | Refills: 3 | Status: SHIPPED | OUTPATIENT
Start: 2024-01-18

## 2024-01-18 RX ORDER — LANOLIN ALCOHOL/MO/W.PET/CERES
400 CREAM (GRAM) TOPICAL DAILY
Qty: 90 TABLET | Refills: 2 | Status: SHIPPED | OUTPATIENT
Start: 2024-01-18

## 2024-01-18 RX ORDER — LEVOTHYROXINE SODIUM 0.05 MG/1
50 TABLET ORAL DAILY
Qty: 90 TABLET | Refills: 2 | Status: SHIPPED | OUTPATIENT
Start: 2024-01-18

## 2024-01-18 RX ORDER — POTASSIUM CHLORIDE 20 MEQ/1
10 TABLET, EXTENDED RELEASE ORAL 2 TIMES DAILY
Qty: 180 TABLET | Refills: 2 | Status: SHIPPED | OUTPATIENT
Start: 2024-01-18

## 2024-01-18 RX ORDER — FLUTICASONE PROPIONATE 50 MCG
2 SPRAY, SUSPENSION (ML) NASAL DAILY
Qty: 16 G | Refills: 5 | Status: SHIPPED | OUTPATIENT
Start: 2024-01-18

## 2024-01-18 RX ORDER — ACETAMINOPHEN 160 MG
2000 TABLET,DISINTEGRATING ORAL DAILY
Qty: 90 CAPSULE | Refills: 2 | Status: SHIPPED | OUTPATIENT
Start: 2024-01-18

## 2024-01-18 RX ORDER — LOSARTAN POTASSIUM 50 MG/1
50 TABLET ORAL 2 TIMES DAILY
Qty: 90 TABLET | Refills: 2 | Status: SHIPPED | OUTPATIENT
Start: 2024-01-18

## 2024-01-18 RX ADMIN — CYANOCOBALAMIN 1000 MCG: 1000 INJECTION, SOLUTION INTRAMUSCULAR; SUBCUTANEOUS at 16:37

## 2024-01-18 ASSESSMENT — PATIENT HEALTH QUESTIONNAIRE - PHQ9
1. LITTLE INTEREST OR PLEASURE IN DOING THINGS: 0
SUM OF ALL RESPONSES TO PHQ QUESTIONS 1-9: 0
2. FEELING DOWN, DEPRESSED OR HOPELESS: 0
SUM OF ALL RESPONSES TO PHQ QUESTIONS 1-9: 0
SUM OF ALL RESPONSES TO PHQ9 QUESTIONS 1 & 2: 0
SUM OF ALL RESPONSES TO PHQ QUESTIONS 1-9: 0
SUM OF ALL RESPONSES TO PHQ QUESTIONS 1-9: 0

## 2024-01-18 ASSESSMENT — ENCOUNTER SYMPTOMS
DIARRHEA: 1
NAUSEA: 0
SHORTNESS OF BREATH: 1
CHEST TIGHTNESS: 0

## 2024-01-18 ASSESSMENT — LIFESTYLE VARIABLES
HOW OFTEN DO YOU HAVE A DRINK CONTAINING ALCOHOL: NEVER
HOW MANY STANDARD DRINKS CONTAINING ALCOHOL DO YOU HAVE ON A TYPICAL DAY: PATIENT DOES NOT DRINK

## 2024-01-18 NOTE — ASSESSMENT & PLAN NOTE
Patient is taking statin without side effect.  Discussed benefits of statin in prevention of coronary arterty and cerebrovascular disease.  Last LDL was 76

## 2024-01-18 NOTE — ASSESSMENT & PLAN NOTE
Given b12 injection today.  Follow up on level.  Encouraged to start daily replacement.   Most likely secondary to inflammatory bowel disease

## 2024-01-18 NOTE — PROGRESS NOTES
DAILY, Disp-90 tablet, R-2Normal  -     losartan (COZAAR) 50 MG tablet; Take 1 tablet by mouth 2 times daily, Disp-90 tablet, R-2Normal  -     potassium chloride (KLOR-CON M) 20 MEQ extended release tablet; Take 0.5 tablets by mouth 2 times daily TAKE 1 TABLET BY MOUTH TWICE DAILY, Disp-180 tablet, R-2Normal  9. Allergic rhinitis, unspecified seasonality, unspecified trigger  Assessment & Plan:   Continue medications as needed.  Orders:  -     fluticasone (FLONASE) 50 MCG/ACT nasal spray; 2 sprays by Nasal route daily, Disp-16 g, R-5Normal  10. Hypomagnesemia  -     magnesium oxide (MAG-OX) 400 (240 Mg) MG tablet; Take 1 tablet by mouth daily, Disp-90 tablet, R-2Normal  11. Mixed hyperlipidemia  Assessment & Plan:  Patient is taking statin without side effect.  Discussed benefits of statin in prevention of coronary arterty and cerebrovascular disease.  Last LDL was 76    Orders:  -     rosuvastatin (CRESTOR) 10 MG tablet; Take 1 tablet by mouth daily TAKE 1 TABLET BY MOUTH EVERY NIGHT, Disp-90 tablet, R-2Normal           Ant Olmstead MD

## 2024-01-18 NOTE — PATIENT INSTRUCTIONS
want certain Buddhism practices performed before you die?  When should you call for help?  Be sure to contact your doctor if you have any questions.  Where can you learn more?  Go to [email or web address] and enter R264 to learn more about \"Advance Directives: Care Instructions.\"  Current as of: March 26, 2023               Content Version: 13.9  © 0204-2756 [employer].   Care instructions adapted under license by [employer]. If you have questions about a medical condition or this instruction, always ask your healthcare professional. [employer] disclaims any warranty or liability for your use of this information.           A Healthy Heart: Care Instructions  Your Care Instructions     Coronary artery disease, also called heart disease, occurs when a substance called plaque builds up in the vessels that supply oxygen-rich blood to your heart muscle. This can narrow the blood vessels and reduce blood flow. A heart attack happens when blood flow is completely blocked. A high-fat diet, smoking, and other factors increase the risk of heart disease.  Your doctor has found that you have a chance of having heart disease. You can do lots of things to keep your heart healthy. It may not be easy, but you can change your diet, exercise more, and quit smoking. These steps really work to lower your chance of heart disease.  Follow-up care is a key part of your treatment and safety. Be sure to make and go to all appointments, and call your doctor if you are having problems. It's also a good idea to know your test results and keep a list of the medicines you take.  How can you care for yourself at home?  Diet    Use less salt when you cook and eat. This helps lower your blood pressure. Taste food before salting. Add only a little salt when you think you need it. With time, your taste buds will adjust to less salt.     Eat fewer snack items, fast foods, canned soups, and other high-salt,

## 2024-01-18 NOTE — ASSESSMENT & PLAN NOTE
Blood pressure is at goal for age.  Encouraged continued medication compliance, DASH or Mediterranean diet and daily physical activity.  -Normal ECHO in 2021 except for mild mitral regurgitation  -cardiac monitor results pending.

## 2024-01-29 ENCOUNTER — TELEPHONE (OUTPATIENT)
Dept: FAMILY MEDICINE CLINIC | Facility: CLINIC | Age: 79
End: 2024-01-29

## 2024-01-29 DIAGNOSIS — E53.8 B12 DEFICIENCY: Primary | ICD-10-CM

## 2024-01-29 RX ORDER — LANOLIN ALCOHOL/MO/W.PET/CERES
1000 CREAM (GRAM) TOPICAL DAILY
Qty: 90 TABLET | Refills: 3 | Status: SHIPPED | OUTPATIENT
Start: 2024-01-29

## 2024-02-15 ENCOUNTER — OFFICE VISIT (OUTPATIENT)
Dept: FAMILY MEDICINE CLINIC | Facility: CLINIC | Age: 79
End: 2024-02-15
Payer: MEDICARE

## 2024-02-15 VITALS
TEMPERATURE: 99 F | HEART RATE: 88 BPM | SYSTOLIC BLOOD PRESSURE: 120 MMHG | OXYGEN SATURATION: 98 % | DIASTOLIC BLOOD PRESSURE: 64 MMHG

## 2024-02-15 DIAGNOSIS — R68.89 FLU-LIKE SYMPTOMS: Primary | ICD-10-CM

## 2024-02-15 DIAGNOSIS — U07.1 COVID-19: ICD-10-CM

## 2024-02-15 PROCEDURE — 1090F PRES/ABSN URINE INCON ASSESS: CPT | Performed by: NURSE PRACTITIONER

## 2024-02-15 PROCEDURE — G8427 DOCREV CUR MEDS BY ELIG CLIN: HCPCS | Performed by: NURSE PRACTITIONER

## 2024-02-15 PROCEDURE — 3078F DIAST BP <80 MM HG: CPT | Performed by: NURSE PRACTITIONER

## 2024-02-15 PROCEDURE — G8417 CALC BMI ABV UP PARAM F/U: HCPCS | Performed by: NURSE PRACTITIONER

## 2024-02-15 PROCEDURE — 99214 OFFICE O/P EST MOD 30 MIN: CPT | Performed by: NURSE PRACTITIONER

## 2024-02-15 PROCEDURE — 3074F SYST BP LT 130 MM HG: CPT | Performed by: NURSE PRACTITIONER

## 2024-02-15 PROCEDURE — 1123F ACP DISCUSS/DSCN MKR DOCD: CPT | Performed by: NURSE PRACTITIONER

## 2024-02-15 PROCEDURE — G8484 FLU IMMUNIZE NO ADMIN: HCPCS | Performed by: NURSE PRACTITIONER

## 2024-02-15 PROCEDURE — 1036F TOBACCO NON-USER: CPT | Performed by: NURSE PRACTITIONER

## 2024-02-15 PROCEDURE — G8399 PT W/DXA RESULTS DOCUMENT: HCPCS | Performed by: NURSE PRACTITIONER

## 2024-02-15 RX ORDER — ONDANSETRON 4 MG/1
4 TABLET, ORALLY DISINTEGRATING ORAL 3 TIMES DAILY PRN
Qty: 21 TABLET | Refills: 0 | Status: SHIPPED | OUTPATIENT
Start: 2024-02-15

## 2024-02-15 NOTE — PROGRESS NOTES
Josefina Hernandez is a 78 y.o. female who presents today for the following:  Chief Complaint   Patient presents with   • Cough     Pt presents today with cough, sore throat, fever, sinus drainage and congestion, fatigue, nausea x 5 days.        Allergies   Allergen Reactions   • Enoxaparin Other (See Comments)     HIT+   • Heparin (Porcine) Other (See Comments)     HIT   • Morphine Nausea Only     Nausea and stomach pains   • Sulfa Antibiotics Nausea And Vomiting       Current Outpatient Medications   Medication Sig Dispense Refill   • molnupiravir 200 MG capsule Take 4 capsules by mouth in the morning and 4 capsules in the evening. Do all this for 5 days. 40 capsule 0   • ondansetron (ZOFRAN-ODT) 4 MG disintegrating tablet Take 1 tablet by mouth 3 times daily as needed for Nausea or Vomiting 21 tablet 0   • vitamin B-12 (CYANOCOBALAMIN) 1000 MCG tablet Take 1 tablet by mouth daily 90 tablet 3   • amLODIPine (NORVASC) 10 MG tablet Take 1 tablet by mouth daily TAKE 1 TABLET BY MOUTH DAILY 90 tablet 2   • Cholecalciferol (VITAMIN D3) 50 MCG (2000 UT) CAPS Take 1 capsule by mouth daily 90 capsule 2   • fluticasone (FLONASE) 50 MCG/ACT nasal spray 2 sprays by Nasal route daily 16 g 5   • levothyroxine (SYNTHROID) 50 MCG tablet Take 1 tablet by mouth Daily TAKE 1 TABLET BY MOUTH DAILY BEFORE BREAKFAST 90 tablet 2   • losartan (COZAAR) 50 MG tablet Take 1 tablet by mouth 2 times daily 90 tablet 2   • magnesium oxide (MAG-OX) 400 (240 Mg) MG tablet Take 1 tablet by mouth daily 90 tablet 2   • metFORMIN (GLUCOPHAGE) 500 MG tablet Take 1 tablet by mouth 2 times daily (with meals) 180 tablet 2   • potassium chloride (KLOR-CON M) 20 MEQ extended release tablet Take 0.5 tablets by mouth 2 times daily TAKE 1 TABLET BY MOUTH TWICE DAILY 180 tablet 2   • rosuvastatin (CRESTOR) 10 MG tablet Take 1 tablet by mouth daily TAKE 1 TABLET BY MOUTH EVERY NIGHT 90 tablet 2   • dorzolamide (TRUSOPT) 2 % ophthalmic solution INSTILL 1

## 2024-02-15 NOTE — PATIENT INSTRUCTIONS
Counseled patient that symptoms are consistent with COVID-19 viral URI.  These type of infections typically resolve within 1-2 weeks.  No antibiotics are needed and will not be helpful.  Recommend supportive care of humidifier, honey, mucinex, tylenol, rest, and increased fluid intake.  Advised to call for fever, worsening shortness of breath, chest pain, worsening persistent sinus pain or severe ear pain. Cough may linger for several weeks.  Advised to call if symptoms worsen.

## 2024-02-26 ENCOUNTER — NURSE ONLY (OUTPATIENT)
Dept: FAMILY MEDICINE CLINIC | Facility: CLINIC | Age: 79
End: 2024-02-26
Payer: MEDICARE

## 2024-02-26 DIAGNOSIS — E53.8 B12 DEFICIENCY: Primary | ICD-10-CM

## 2024-02-26 PROCEDURE — 96372 THER/PROPH/DIAG INJ SC/IM: CPT | Performed by: FAMILY MEDICINE

## 2024-02-26 PROCEDURE — 99212 OFFICE O/P EST SF 10 MIN: CPT | Performed by: FAMILY MEDICINE

## 2024-02-26 PROCEDURE — 1123F ACP DISCUSS/DSCN MKR DOCD: CPT | Performed by: FAMILY MEDICINE

## 2024-02-26 RX ORDER — CYANOCOBALAMIN 1000 UG/ML
1000 INJECTION, SOLUTION INTRAMUSCULAR; SUBCUTANEOUS ONCE
Status: COMPLETED | OUTPATIENT
Start: 2024-02-26 | End: 2024-02-26

## 2024-02-26 RX ADMIN — CYANOCOBALAMIN 1000 MCG: 1000 INJECTION, SOLUTION INTRAMUSCULAR; SUBCUTANEOUS at 09:57

## 2024-02-26 NOTE — PROGRESS NOTES
Patient presented to [facility] this morning for her monthly Vitamin B12 injection d/t B12 deficiency.   Injection administered to L deltoid. Patient tolerated well. No adverse reactions noted. Scheduled next B12 injection for 3/25/24 at 10am.

## 2024-03-03 ENCOUNTER — APPOINTMENT (OUTPATIENT)
Dept: CT IMAGING | Age: 79
End: 2024-03-03
Payer: MEDICARE

## 2024-03-03 ENCOUNTER — APPOINTMENT (OUTPATIENT)
Dept: GENERAL RADIOLOGY | Age: 79
End: 2024-03-03
Payer: MEDICARE

## 2024-03-03 ENCOUNTER — HOSPITAL ENCOUNTER (EMERGENCY)
Age: 79
Discharge: HOME | End: 2024-03-03
Attending: EMERGENCY MEDICINE
Payer: MEDICARE

## 2024-03-03 VITALS
SYSTOLIC BLOOD PRESSURE: 136 MMHG | TEMPERATURE: 97.4 F | OXYGEN SATURATION: 96 % | BODY MASS INDEX: 28.13 KG/M2 | WEIGHT: 149 LBS | HEART RATE: 76 BPM | DIASTOLIC BLOOD PRESSURE: 72 MMHG | RESPIRATION RATE: 20 BRPM | HEIGHT: 61 IN

## 2024-03-03 DIAGNOSIS — R00.2 PALPITATIONS: Primary | ICD-10-CM

## 2024-03-03 LAB
ALBUMIN SERPL-MCNC: 4 G/DL (ref 3.2–4.6)
ALBUMIN/GLOB SERPL: 0.8 (ref 0.4–1.6)
ALP SERPL-CCNC: 68 U/L (ref 50–136)
ALT SERPL-CCNC: 13 U/L (ref 12–65)
ANION GAP SERPL CALC-SCNC: 8 MMOL/L (ref 2–11)
AST SERPL-CCNC: 12 U/L (ref 15–37)
BASOPHILS # BLD: 0.1 K/UL (ref 0–0.2)
BASOPHILS NFR BLD: 1 % (ref 0–2)
BILIRUB SERPL-MCNC: 0.5 MG/DL (ref 0.2–1.1)
BUN SERPL-MCNC: 14 MG/DL (ref 8–23)
CALCIUM SERPL-MCNC: 9.5 MG/DL (ref 8.3–10.4)
CHLORIDE SERPL-SCNC: 106 MMOL/L (ref 103–113)
CO2 SERPL-SCNC: 28 MMOL/L (ref 21–32)
CREAT SERPL-MCNC: 1.1 MG/DL (ref 0.6–1)
D DIMER PPP FEU-MCNC: 1.12 UG/ML(FEU)
DIFFERENTIAL METHOD BLD: ABNORMAL
EKG ATRIAL RATE: 89 BPM
EKG ATRIAL RATE: 92 BPM
EKG DIAGNOSIS: NORMAL
EKG DIAGNOSIS: NORMAL
EKG P AXIS: 57 DEGREES
EKG P AXIS: 77 DEGREES
EKG P-R INTERVAL: 156 MS
EKG P-R INTERVAL: 161 MS
EKG Q-T INTERVAL: 374 MS
EKG Q-T INTERVAL: 378 MS
EKG QRS DURATION: 74 MS
EKG QRS DURATION: 80 MS
EKG QTC CALCULATION (BAZETT): 459 MS
EKG QTC CALCULATION (BAZETT): 463 MS
EKG R AXIS: -5 DEGREES
EKG R AXIS: -6 DEGREES
EKG T AXIS: 105 DEGREES
EKG T AXIS: 72 DEGREES
EKG VENTRICULAR RATE: 89 BPM
EKG VENTRICULAR RATE: 92 BPM
EOSINOPHIL # BLD: 0.1 K/UL (ref 0–0.8)
EOSINOPHIL NFR BLD: 2 % (ref 0.5–7.8)
ERYTHROCYTE [DISTWIDTH] IN BLOOD BY AUTOMATED COUNT: 13.8 % (ref 11.9–14.6)
GLOBULIN SER CALC-MCNC: 4.8 G/DL (ref 2.8–4.5)
GLUCOSE SERPL-MCNC: 135 MG/DL (ref 65–100)
HCT VFR BLD AUTO: 39.5 % (ref 35.8–46.3)
HGB BLD-MCNC: 12.3 G/DL (ref 11.7–15.4)
IMM GRANULOCYTES # BLD AUTO: 0 K/UL (ref 0–0.5)
IMM GRANULOCYTES NFR BLD AUTO: 0 % (ref 0–5)
LYMPHOCYTES # BLD: 2.3 K/UL (ref 0.5–4.6)
LYMPHOCYTES NFR BLD: 40 % (ref 13–44)
MAGNESIUM SERPL-MCNC: 1.7 MG/DL (ref 1.8–2.4)
MCH RBC QN AUTO: 26.3 PG (ref 26.1–32.9)
MCHC RBC AUTO-ENTMCNC: 31.1 G/DL (ref 31.4–35)
MCV RBC AUTO: 84.6 FL (ref 82–102)
MONOCYTES # BLD: 0.4 K/UL (ref 0.1–1.3)
MONOCYTES NFR BLD: 7 % (ref 4–12)
NEUTS SEG # BLD: 2.9 K/UL (ref 1.7–8.2)
NEUTS SEG NFR BLD: 50 % (ref 43–78)
NRBC # BLD: 0 K/UL (ref 0–0.2)
PLATELET # BLD AUTO: 280 K/UL (ref 150–450)
PMV BLD AUTO: 11.1 FL (ref 9.4–12.3)
POTASSIUM SERPL-SCNC: 3.6 MMOL/L (ref 3.5–5.1)
PROT SERPL-MCNC: 8.8 G/DL (ref 6.3–8.2)
RBC # BLD AUTO: 4.67 M/UL (ref 4.05–5.2)
SODIUM SERPL-SCNC: 142 MMOL/L (ref 136–146)
TROPONIN I SERPL HS-MCNC: 10.1 PG/ML (ref 0–14)
TROPONIN I SERPL HS-MCNC: 6.1 PG/ML (ref 0–14)
WBC # BLD AUTO: 5.7 K/UL (ref 4.3–11.1)

## 2024-03-03 PROCEDURE — 85379 FIBRIN DEGRADATION QUANT: CPT

## 2024-03-03 PROCEDURE — 80053 COMPREHEN METABOLIC PANEL: CPT

## 2024-03-03 PROCEDURE — 93010 ELECTROCARDIOGRAM REPORT: CPT | Performed by: INTERNAL MEDICINE

## 2024-03-03 PROCEDURE — 99285 EMERGENCY DEPT VISIT HI MDM: CPT

## 2024-03-03 PROCEDURE — 83735 ASSAY OF MAGNESIUM: CPT

## 2024-03-03 PROCEDURE — 6360000004 HC RX CONTRAST MEDICATION: Performed by: EMERGENCY MEDICINE

## 2024-03-03 PROCEDURE — 84484 ASSAY OF TROPONIN QUANT: CPT

## 2024-03-03 PROCEDURE — 71260 CT THORAX DX C+: CPT

## 2024-03-03 PROCEDURE — 85025 COMPLETE CBC W/AUTO DIFF WBC: CPT

## 2024-03-03 PROCEDURE — 93005 ELECTROCARDIOGRAM TRACING: CPT | Performed by: EMERGENCY MEDICINE

## 2024-03-03 PROCEDURE — 71045 X-RAY EXAM CHEST 1 VIEW: CPT

## 2024-03-03 RX ADMIN — IOPAMIDOL 100 ML: 755 INJECTION, SOLUTION INTRAVENOUS at 12:19

## 2024-03-03 ASSESSMENT — PAIN - FUNCTIONAL ASSESSMENT: PAIN_FUNCTIONAL_ASSESSMENT: NONE - DENIES PAIN

## 2024-03-03 NOTE — ED TRIAGE NOTES
Patient arrived with a complaint of rapid heart rate. Patient was checking her heart rate and saw on her pulse oximeter that it was high. Patient reports of 140. patient reports of having covid 2-3 weeks ago. Denies chest pain, abdominal pain, nausea or vomit. Patient does reports of dizzy feeling this morning. Not on blood thinner  Patient was informed by her primary to come here. No cardiac history.   Arrival HR is 89 for full 1 minute.

## 2024-03-03 NOTE — ED NOTES
Pt given turkey sandwich tray with apple juice per doctors orders.  Discussed pts progress with daughter after pts permission.     Melisa Sheets RN  03/03/24 [id number]

## 2024-03-03 NOTE — ED NOTES
I have reviewed discharge instructions with the patient.  The patient verbalized understanding.    Patient left ED via Discharge Method: wheelchair to Home with friend.    Opportunity for questions and clarification provided.       Patient given 0 scripts.         To continue your aftercare when you leave the hospital, you may receive an automated call from our care team to check in on how you are doing.  This is a free service and part of our promise to provide the best care and service to meet your aftercare needs.” If you have questions, or wish to unsubscribe from this service please call [phone].  Thank you for Choosing our [facility] Emergency Department.        Melisa Sheets RN  03/03/24 [id number]

## 2024-03-03 NOTE — ED PROVIDER NOTES
Emergency Department Provider Note       PCP: Ant Olmstead MD   Age: 78 y.o.   Sex: female     DISPOSITION Decision To Discharge 03/03/2024 01:19:36 PM       ICD-10-CM    1. Palpitations  R00.2 [facility] [place]          Medical Decision Making     She is well-appearing and asymptomatic at this time.  EKG normal and sinus.  No A-fib.  Will obtain labs including D-dimer.  She had COVID 3 weeks ago.  Obtain chest x-ray for any signs of pneumonia although lungs are clear no hypoxia no fever.  Abdomen is soft.  No guarding or rebound.  Neuro intact.  ED Course as of 03/03/24 1409   Sun Mar 03, 2024   1148 D-dimer elevated at 1.12.  Currently asymptomatic.  Due to palpitation reported at 140 with elevated D-dimer will obtain CT chest for assessment. [DT]   1310 EKG #2 obtained interpreted by me.  Rate of 92.  Normal sinus rhythm.  Normal axis and interval.  No STEMI or ischemic changes noted. [DT]      ED Course User Index  [DT] Ta, Moris MD Mehdi     2:09 PM  CT chest without acute PE.  Repeat troponin unremarkable.  Second EKG unremarkable.  No cause of palpitation however she has not had recurrent symptoms.  Will discharge home.  Follow-up with cardiology.  No neurodeficit.  Lungs are clear.  Abdomen soft.  Do not suspect acute intra-abdominal pathology.  Do not suspect ACS, dissection, PE, pneumonia, pneumothorax.    1 acute illness with systemic symptoms.  Shared medical decision making was utilized in creating the patients health plan today.    I independently ordered and reviewed each unique test.       I interpreted the X-rays chest x-ray unremarkable.  I interpreted the CT Scan CT without PE pneumonia pneumothorax.  Agree radiologist interpretation.  I interpreted the labs.  My Independent EKG Interpretation: sinus rhythm, no evidence of arrhythmia      ST Segments:Normal ST segments - NO STEMI   Rate:  89            History     HPI  78 female here with palpitation.  When she woke up

## 2024-03-05 ENCOUNTER — TELEPHONE (OUTPATIENT)
Dept: FAMILY MEDICINE CLINIC | Facility: CLINIC | Age: 79
End: 2024-03-05

## 2024-03-05 DIAGNOSIS — R00.0 RACING HEART BEAT: Primary | ICD-10-CM

## 2024-03-05 RX ORDER — METOPROLOL SUCCINATE 25 MG/1
12.5 TABLET, EXTENDED RELEASE ORAL DAILY
Qty: 45 TABLET | Refills: 1 | Status: SHIPPED | OUTPATIENT
Start: 2024-03-05 | End: 2024-03-05

## 2024-03-05 RX ORDER — METOPROLOL SUCCINATE 25 MG/1
12.5 TABLET, EXTENDED RELEASE ORAL DAILY
Qty: 45 TABLET | Refills: 1 | Status: SHIPPED | OUTPATIENT
Start: 2024-03-05

## 2024-03-05 NOTE — TELEPHONE ENCOUNTER
1. Racing heart beat  Episodes of racing heart beat.  Has appointment with cardiology 03/07/2024.  Will start low dose betablocker until appointment to help with symptoms.  Patient is in agreement with plan.  -     metoprolol succinate (TOPROL XL) 25 MG extended release tablet; Take 0.5 tablets by mouth daily, Disp-45 tablet, R-1Normal

## 2024-03-05 NOTE — TELEPHONE ENCOUNTER
Pt called stating she went to ED on Chris 3/3 after talking to Mechelle Shaw NP on call.  She made a ED F/U appt on 3/6 but stated she is still not feeling well and may go back to the ED today. She wanted Dr. Olmstead to review her ER visit and advise.

## 2024-03-05 NOTE — TELEPHONE ENCOUNTER
Dr. Olmstead discussed with patient. Appt w/ her tomorrow cancelled. Patient will keep follow up w/ cardiology on 3/7/24.

## 2024-03-05 NOTE — TELEPHONE ENCOUNTER
Late Entry:  pt called answering service Sunday, March 3rd 8:41 am with elevated  on her oximeter.  She woke up and felt palpitations and went to the bathroom; checked and her pulse was up to 140.  Denies any symptoms otherwise such as SOB or chest pain.  Her pulse is maintaining in the 90s.  She agrees to go to the ED if pulse is sustained and she has symptoms of dizziness, CP, heart palpitations, or SOB.

## 2024-03-06 ENCOUNTER — OFFICE VISIT (OUTPATIENT)
Dept: PULMONOLOGY | Age: 79
End: 2024-03-06
Payer: MEDICARE

## 2024-03-06 VITALS
HEART RATE: 84 BPM | BODY MASS INDEX: 27.38 KG/M2 | TEMPERATURE: 97.3 F | OXYGEN SATURATION: 98 % | WEIGHT: 145 LBS | DIASTOLIC BLOOD PRESSURE: 68 MMHG | HEIGHT: 61 IN | RESPIRATION RATE: 16 BRPM | SYSTOLIC BLOOD PRESSURE: 128 MMHG

## 2024-03-06 DIAGNOSIS — Z86.711 HX OF PULMONARY EMBOLUS: ICD-10-CM

## 2024-03-06 DIAGNOSIS — R06.02 SOB (SHORTNESS OF BREATH): ICD-10-CM

## 2024-03-06 DIAGNOSIS — J30.89 ENVIRONMENTAL AND SEASONAL ALLERGIES: ICD-10-CM

## 2024-03-06 DIAGNOSIS — G47.33 OSA (OBSTRUCTIVE SLEEP APNEA): Primary | ICD-10-CM

## 2024-03-06 PROCEDURE — 1090F PRES/ABSN URINE INCON ASSESS: CPT | Performed by: NURSE PRACTITIONER

## 2024-03-06 PROCEDURE — 1036F TOBACCO NON-USER: CPT | Performed by: NURSE PRACTITIONER

## 2024-03-06 PROCEDURE — 3078F DIAST BP <80 MM HG: CPT | Performed by: NURSE PRACTITIONER

## 2024-03-06 PROCEDURE — 99214 OFFICE O/P EST MOD 30 MIN: CPT | Performed by: NURSE PRACTITIONER

## 2024-03-06 PROCEDURE — 3074F SYST BP LT 130 MM HG: CPT | Performed by: NURSE PRACTITIONER

## 2024-03-06 PROCEDURE — G8399 PT W/DXA RESULTS DOCUMENT: HCPCS | Performed by: NURSE PRACTITIONER

## 2024-03-06 PROCEDURE — 1123F ACP DISCUSS/DSCN MKR DOCD: CPT | Performed by: NURSE PRACTITIONER

## 2024-03-06 PROCEDURE — G8484 FLU IMMUNIZE NO ADMIN: HCPCS | Performed by: NURSE PRACTITIONER

## 2024-03-06 PROCEDURE — G8427 DOCREV CUR MEDS BY ELIG CLIN: HCPCS | Performed by: NURSE PRACTITIONER

## 2024-03-06 PROCEDURE — G8417 CALC BMI ABV UP PARAM F/U: HCPCS | Performed by: NURSE PRACTITIONER

## 2024-03-06 NOTE — PROGRESS NOTES
Name:  Josefina Hernandez  YOB: 1945   MRN: [id number]      Office Visit: 3/6/2024        ASSESSMENT AND PLAN:  (Medical Decision Making)    1. SOB (shortness of breath)  --intermittent at rest and with exertion  --FeNo 22 and numerically, spirometry was normal.  CPFT's  with very mild air trapping, no bronchodilator response and otherwise normal  --she has a history of UC and is on stelara now.  today does not feel like this is an issue  --has albuterol to use as needed    2. Environmental and seasonal allergies  --mild and occasionally uses flonase for this.    3. Hx of pulmonary embolus  --back in 2016; 1st and only episode thus far.      4. Obstructed sleep apnea  -- Unfortunately did not tolerate CPAP, but did not try but for 1 month.  She returned the CPAP, therefore this needs to be reevaluated in order to get a new CPAP.  Patient is willing to try this again.  Will set up for an in lab split-night sleep study    Imaging and pulmonary function without concerns for underlying lung disease.  Currently is dealing with palpitations and sees cardiology tomorrow.      Patient can follow-up with pulmonary as needed    No orders of the defined types were placed in this encounter.    No orders of the defined types were placed in this encounter.          Bhakti Prakash, APRN - CNP    No specialty comments available.    Collaborating physician is Jero Ventura MD  ____________________________________________________________    HISTORY OF PRESENT ILLNESS:    Ms. Josefina Hernandez is a 78 y.o. female who is seen at [facility] today for  History of PE and Shortness of Breath  Ms Hernandez follows up today with her mild and very intermittent shortness of breath.  Her  is with her for the visit.  Overall reports that her breathing has not been an issue.  She has very mild and intermittent seasonal allergy issues.  She uses Flonase as needed.  She has a rescue inhaler that she has not used at

## 2024-03-07 ENCOUNTER — INITIAL CONSULT (OUTPATIENT)
Age: 79
End: 2024-03-07
Payer: MEDICARE

## 2024-03-07 VITALS
HEART RATE: 80 BPM | WEIGHT: 147.8 LBS | DIASTOLIC BLOOD PRESSURE: 64 MMHG | SYSTOLIC BLOOD PRESSURE: 144 MMHG | HEIGHT: 61 IN | BODY MASS INDEX: 27.9 KG/M2

## 2024-03-07 DIAGNOSIS — R00.2 PALPITATIONS: Primary | ICD-10-CM

## 2024-03-07 PROCEDURE — 1090F PRES/ABSN URINE INCON ASSESS: CPT | Performed by: INTERNAL MEDICINE

## 2024-03-07 PROCEDURE — 1036F TOBACCO NON-USER: CPT | Performed by: INTERNAL MEDICINE

## 2024-03-07 PROCEDURE — 3078F DIAST BP <80 MM HG: CPT | Performed by: INTERNAL MEDICINE

## 2024-03-07 PROCEDURE — G8484 FLU IMMUNIZE NO ADMIN: HCPCS | Performed by: INTERNAL MEDICINE

## 2024-03-07 PROCEDURE — G8427 DOCREV CUR MEDS BY ELIG CLIN: HCPCS | Performed by: INTERNAL MEDICINE

## 2024-03-07 PROCEDURE — 1123F ACP DISCUSS/DSCN MKR DOCD: CPT | Performed by: INTERNAL MEDICINE

## 2024-03-07 PROCEDURE — G8417 CALC BMI ABV UP PARAM F/U: HCPCS | Performed by: INTERNAL MEDICINE

## 2024-03-07 PROCEDURE — G8399 PT W/DXA RESULTS DOCUMENT: HCPCS | Performed by: INTERNAL MEDICINE

## 2024-03-07 PROCEDURE — 99214 OFFICE O/P EST MOD 30 MIN: CPT | Performed by: INTERNAL MEDICINE

## 2024-03-07 PROCEDURE — 3077F SYST BP >= 140 MM HG: CPT | Performed by: INTERNAL MEDICINE

## 2024-03-07 ASSESSMENT — ENCOUNTER SYMPTOMS
ABDOMINAL PAIN: 0
SHORTNESS OF BREATH: 0

## 2024-03-07 NOTE — PROGRESS NOTES
Smokeless tobacco: Never   Substance Use Topics    Alcohol use: No       ROS:    Review of Systems   Constitutional: Negative for chills, diaphoresis and fever.   HENT:  Negative for hearing loss.    Eyes:  Negative for visual disturbance.   Cardiovascular:         As per the HPI   Respiratory:  Negative for shortness of breath.    Hematologic/Lymphatic: Does not bruise/bleed easily.   Gastrointestinal:  Negative for abdominal pain.   Genitourinary:  Negative for dysuria.   Neurological:  Negative for focal weakness.   Psychiatric/Behavioral:  Negative for suicidal ideas.           PHYSICAL EXAM:   BP (!) 144/64   Pulse 80   Ht 1.549 m (5' 1\")   Wt 67 kg (147 lb 12.8 oz)   BMI 27.93 kg/m²      Wt Readings from Last 3 Encounters:   03/07/24 67 kg (147 lb 12.8 oz)   03/06/24 65.8 kg (145 lb)   03/03/24 67.6 kg (149 lb)     BP Readings from Last 3 Encounters:   03/07/24 (!) 144/64   03/06/24 128/68   03/03/24 136/72     Pulse Readings from Last 3 Encounters:   03/07/24 80   03/06/24 84   03/03/24 76           Physical Exam  Vitals reviewed.   Constitutional:       Appearance: Normal appearance.      Comments: Appears younger than stated age   HENT:      Head: Normocephalic and atraumatic.   Eyes:      General: No scleral icterus.  Neck:      Vascular: No carotid bruit.   Cardiovascular:      Rate and Rhythm: Normal rate and regular rhythm.      Heart sounds: No murmur heard.     No gallop.   Pulmonary:      Breath sounds: Normal breath sounds.   Abdominal:      Palpations: Abdomen is soft.   Musculoskeletal:         General: No swelling.      Cervical back: Neck supple.   Skin:     General: Skin is warm and dry.   Neurological:      Mental Status: She is alert and oriented to person, place, and time.   Psychiatric:         Mood and Affect: Mood normal.         Medical problems and test results were reviewed with the patient today.     DATA REVIEW    LIPID PANEL     Lab Results   Component Value Date    CHOL 148

## 2024-03-15 ENCOUNTER — OFFICE VISIT (OUTPATIENT)
Dept: FAMILY MEDICINE CLINIC | Facility: CLINIC | Age: 79
End: 2024-03-15
Payer: MEDICARE

## 2024-03-15 VITALS
TEMPERATURE: 97.6 F | SYSTOLIC BLOOD PRESSURE: 144 MMHG | DIASTOLIC BLOOD PRESSURE: 60 MMHG | HEIGHT: 61 IN | WEIGHT: 146 LBS | OXYGEN SATURATION: 99 % | BODY MASS INDEX: 27.56 KG/M2 | HEART RATE: 76 BPM

## 2024-03-15 DIAGNOSIS — K51.018: Primary | ICD-10-CM

## 2024-03-15 DIAGNOSIS — R10.13 EPIGASTRIC PAIN: ICD-10-CM

## 2024-03-15 LAB
ALBUMIN SERPL-MCNC: 3.7 G/DL (ref 3.2–4.6)
ALBUMIN/GLOB SERPL: 0.8 (ref 0.4–1.6)
ALP SERPL-CCNC: 65 U/L (ref 50–136)
ALT SERPL-CCNC: 13 U/L (ref 12–65)
ANION GAP SERPL CALC-SCNC: 8 MMOL/L (ref 2–11)
AST SERPL-CCNC: 14 U/L (ref 15–37)
BASOPHILS # BLD: 0.1 K/UL (ref 0–0.2)
BASOPHILS NFR BLD: 1 % (ref 0–2)
BILIRUB SERPL-MCNC: 0.3 MG/DL (ref 0.2–1.1)
BUN SERPL-MCNC: 16 MG/DL (ref 8–23)
CALCIUM SERPL-MCNC: 9.6 MG/DL (ref 8.3–10.4)
CHLORIDE SERPL-SCNC: 107 MMOL/L (ref 103–113)
CO2 SERPL-SCNC: 26 MMOL/L (ref 21–32)
CREAT SERPL-MCNC: 1.1 MG/DL (ref 0.6–1)
CRP SERPL-MCNC: <0.3 MG/DL (ref 0–0.9)
DIFFERENTIAL METHOD BLD: ABNORMAL
EOSINOPHIL # BLD: 0.1 K/UL (ref 0–0.8)
EOSINOPHIL NFR BLD: 2 % (ref 0.5–7.8)
ERYTHROCYTE [DISTWIDTH] IN BLOOD BY AUTOMATED COUNT: 14 % (ref 11.9–14.6)
ERYTHROCYTE [SEDIMENTATION RATE] IN BLOOD: 50 MM/HR (ref 0–30)
GLOBULIN SER CALC-MCNC: 4.4 G/DL (ref 2.8–4.5)
GLUCOSE SERPL-MCNC: 153 MG/DL (ref 65–100)
HCT VFR BLD AUTO: 38.5 % (ref 35.8–46.3)
HGB BLD-MCNC: 12 G/DL (ref 11.7–15.4)
IMM GRANULOCYTES # BLD AUTO: 0 K/UL (ref 0–0.5)
IMM GRANULOCYTES NFR BLD AUTO: 0 % (ref 0–5)
LIPASE SERPL-CCNC: 78 U/L (ref 73–393)
LYMPHOCYTES # BLD: 2.1 K/UL (ref 0.5–4.6)
LYMPHOCYTES NFR BLD: 40 % (ref 13–44)
MCH RBC QN AUTO: 27 PG (ref 26.1–32.9)
MCHC RBC AUTO-ENTMCNC: 31.2 G/DL (ref 31.4–35)
MCV RBC AUTO: 86.5 FL (ref 82–102)
MONOCYTES # BLD: 0.4 K/UL (ref 0.1–1.3)
MONOCYTES NFR BLD: 8 % (ref 4–12)
NEUTS SEG # BLD: 2.5 K/UL (ref 1.7–8.2)
NEUTS SEG NFR BLD: 49 % (ref 43–78)
NRBC # BLD: 0 K/UL (ref 0–0.2)
PLATELET # BLD AUTO: 192 K/UL (ref 150–450)
PMV BLD AUTO: 13.2 FL (ref 9.4–12.3)
POTASSIUM SERPL-SCNC: 3.9 MMOL/L (ref 3.5–5.1)
PROT SERPL-MCNC: 8.1 G/DL (ref 6.3–8.2)
RBC # BLD AUTO: 4.45 M/UL (ref 4.05–5.2)
SODIUM SERPL-SCNC: 141 MMOL/L (ref 136–146)
WBC # BLD AUTO: 5.2 K/UL (ref 4.3–11.1)

## 2024-03-15 PROCEDURE — 3078F DIAST BP <80 MM HG: CPT | Performed by: FAMILY MEDICINE

## 2024-03-15 PROCEDURE — 1090F PRES/ABSN URINE INCON ASSESS: CPT | Performed by: FAMILY MEDICINE

## 2024-03-15 PROCEDURE — 99214 OFFICE O/P EST MOD 30 MIN: CPT | Performed by: FAMILY MEDICINE

## 2024-03-15 PROCEDURE — 3077F SYST BP >= 140 MM HG: CPT | Performed by: FAMILY MEDICINE

## 2024-03-15 PROCEDURE — G8484 FLU IMMUNIZE NO ADMIN: HCPCS | Performed by: FAMILY MEDICINE

## 2024-03-15 PROCEDURE — G8399 PT W/DXA RESULTS DOCUMENT: HCPCS | Performed by: FAMILY MEDICINE

## 2024-03-15 PROCEDURE — G8417 CALC BMI ABV UP PARAM F/U: HCPCS | Performed by: FAMILY MEDICINE

## 2024-03-15 PROCEDURE — G8427 DOCREV CUR MEDS BY ELIG CLIN: HCPCS | Performed by: FAMILY MEDICINE

## 2024-03-15 PROCEDURE — 1036F TOBACCO NON-USER: CPT | Performed by: FAMILY MEDICINE

## 2024-03-15 PROCEDURE — 1123F ACP DISCUSS/DSCN MKR DOCD: CPT | Performed by: FAMILY MEDICINE

## 2024-03-15 RX ORDER — SUCRALFATE 1 G/1
1 TABLET ORAL 4 TIMES DAILY
Qty: 120 TABLET | Refills: 1 | Status: SHIPPED | OUTPATIENT
Start: 2024-03-15 | End: 2024-03-15

## 2024-03-15 RX ORDER — SUCRALFATE 1 G/1
1 TABLET ORAL
Qty: 90 TABLET | Refills: 1 | Status: SHIPPED | OUTPATIENT
Start: 2024-03-15

## 2024-03-15 ASSESSMENT — ENCOUNTER SYMPTOMS
CHEST TIGHTNESS: 0
SHORTNESS OF BREATH: 0

## 2024-03-15 ASSESSMENT — PATIENT HEALTH QUESTIONNAIRE - PHQ9
SUM OF ALL RESPONSES TO PHQ QUESTIONS 1-9: 1
2. FEELING DOWN, DEPRESSED OR HOPELESS: 1
1. LITTLE INTEREST OR PLEASURE IN DOING THINGS: 0
SUM OF ALL RESPONSES TO PHQ9 QUESTIONS 1 & 2: 1

## 2024-03-15 NOTE — PROGRESS NOTES
Josefina Hernandez is a 78 y.o. female who presents today for the following:  Chief Complaint   Patient presents with    Fatigue     Reports weakness & fatigue; abdominal discomfort but unable to be seen by Gastro       Allergies   Allergen Reactions    Enoxaparin Other (See Comments)     HIT+    Heparin (Porcine) Other (See Comments)     HIT    Morphine Nausea Only     Nausea and stomach pains    Sulfa Antibiotics Nausea And Vomiting       Current Outpatient Medications   Medication Sig Dispense Refill    metoprolol succinate (TOPROL XL) 25 MG extended release tablet Take 0.5 tablets by mouth daily (Patient not taking: Reported on 3/7/2024) 45 tablet 1    ondansetron (ZOFRAN-ODT) 4 MG disintegrating tablet Take 1 tablet by mouth 3 times daily as needed for Nausea or Vomiting 21 tablet 0    vitamin B-12 (CYANOCOBALAMIN) 1000 MCG tablet Take 1 tablet by mouth daily (Patient not taking: Reported on 3/7/2024) 90 tablet 3    amLODIPine (NORVASC) 10 MG tablet Take 1 tablet by mouth daily TAKE 1 TABLET BY MOUTH DAILY 90 tablet 2    Cholecalciferol (VITAMIN D3) 50 MCG (2000 UT) CAPS Take 1 capsule by mouth daily (Patient not taking: Reported on 3/6/2024) 90 capsule 2    fluticasone (FLONASE) 50 MCG/ACT nasal spray 2 sprays by Nasal route daily 16 g 5    levothyroxine (SYNTHROID) 50 MCG tablet Take 1 tablet by mouth Daily TAKE 1 TABLET BY MOUTH DAILY BEFORE BREAKFAST 90 tablet 2    losartan (COZAAR) 50 MG tablet Take 1 tablet by mouth 2 times daily 90 tablet 2    magnesium oxide (MAG-OX) 400 (240 Mg) MG tablet Take 1 tablet by mouth daily 90 tablet 2    metFORMIN (GLUCOPHAGE) 500 MG tablet Take 1 tablet by mouth 2 times daily (with meals) 180 tablet 2    potassium chloride (KLOR-CON M) 20 MEQ extended release tablet Take 0.5 tablets by mouth 2 times daily TAKE 1 TABLET BY MOUTH TWICE DAILY 180 tablet 2    rosuvastatin (CRESTOR) 10 MG tablet Take 1 tablet by mouth daily TAKE 1 TABLET BY MOUTH EVERY NIGHT 90 tablet 2

## 2024-03-18 ENCOUNTER — TELEPHONE (OUTPATIENT)
Dept: PULMONOLOGY | Age: 79
End: 2024-03-18

## 2024-03-18 NOTE — TELEPHONE ENCOUNTER
Patient is saying that Bhakti Prakash was to order her another cpap. She was told that she should have it within a week a rush was going to be put on it . I dont see no order  and patient  says that she really needs it . She is having heart palpations .

## 2024-03-20 NOTE — TELEPHONE ENCOUNTER
I spoke with patient and explain to her per Bhakti note that 4. Obstructed sleep apnea  -- Unfortunately did not tolerate CPAP, but did not try but for 1 month.  She returned the CPAP, therefore this needs to be reevaluated in order to get a new CPAP.  Patient is willing to try this again.  Will set up for an in lab split-night sleep study patient understood and will wait to hear fro sleep lab. Marcela olivia

## 2024-03-25 ENCOUNTER — NURSE ONLY (OUTPATIENT)
Dept: FAMILY MEDICINE CLINIC | Facility: CLINIC | Age: 79
End: 2024-03-25
Payer: MEDICARE

## 2024-03-25 DIAGNOSIS — E53.8 B12 DEFICIENCY: Primary | ICD-10-CM

## 2024-03-25 PROCEDURE — 96372 THER/PROPH/DIAG INJ SC/IM: CPT | Performed by: FAMILY MEDICINE

## 2024-03-25 PROCEDURE — 1123F ACP DISCUSS/DSCN MKR DOCD: CPT | Performed by: FAMILY MEDICINE

## 2024-03-25 PROCEDURE — 99212 OFFICE O/P EST SF 10 MIN: CPT | Performed by: FAMILY MEDICINE

## 2024-03-25 RX ORDER — CYANOCOBALAMIN 1000 UG/ML
1000 INJECTION, SOLUTION INTRAMUSCULAR; SUBCUTANEOUS ONCE
Status: COMPLETED | OUTPATIENT
Start: 2024-03-25 | End: 2024-03-25

## 2024-03-25 RX ADMIN — CYANOCOBALAMIN 1000 MCG: 1000 INJECTION, SOLUTION INTRAMUSCULAR; SUBCUTANEOUS at 11:59

## 2024-03-26 ENCOUNTER — TELEPHONE (OUTPATIENT)
Age: 79
End: 2024-03-26

## 2024-03-26 NOTE — TELEPHONE ENCOUNTER
----- Message from Wenceslao Navarrete III, MD sent at 3/26/2024 11:14 AM EDT -----  Please let patient know that the recent cardiac monitor results were normal.  There were no arrhythmias identified.  They can follow-up with me as previously scheduled.

## 2024-04-02 ENCOUNTER — HOSPITAL ENCOUNTER (OUTPATIENT)
Dept: SLEEP CENTER | Age: 79
Discharge: HOME | End: 2024-04-05
Payer: MEDICARE

## 2024-04-02 PROCEDURE — 95810 POLYSOM 6/> YRS 4/> PARAM: CPT

## 2024-04-08 ENCOUNTER — OFFICE VISIT (OUTPATIENT)
Age: 79
End: 2024-04-08
Payer: MEDICARE

## 2024-04-08 VITALS
DIASTOLIC BLOOD PRESSURE: 64 MMHG | WEIGHT: 150 LBS | SYSTOLIC BLOOD PRESSURE: 136 MMHG | HEIGHT: 61 IN | BODY MASS INDEX: 28.32 KG/M2 | HEART RATE: 66 BPM

## 2024-04-08 DIAGNOSIS — R00.2 PALPITATIONS: Primary | ICD-10-CM

## 2024-04-08 PROCEDURE — 3078F DIAST BP <80 MM HG: CPT | Performed by: INTERNAL MEDICINE

## 2024-04-08 PROCEDURE — 99214 OFFICE O/P EST MOD 30 MIN: CPT | Performed by: INTERNAL MEDICINE

## 2024-04-08 PROCEDURE — 3075F SYST BP GE 130 - 139MM HG: CPT | Performed by: INTERNAL MEDICINE

## 2024-04-08 PROCEDURE — 1123F ACP DISCUSS/DSCN MKR DOCD: CPT | Performed by: INTERNAL MEDICINE

## 2024-04-08 PROCEDURE — 1090F PRES/ABSN URINE INCON ASSESS: CPT | Performed by: INTERNAL MEDICINE

## 2024-04-08 PROCEDURE — G8399 PT W/DXA RESULTS DOCUMENT: HCPCS | Performed by: INTERNAL MEDICINE

## 2024-04-08 PROCEDURE — G8417 CALC BMI ABV UP PARAM F/U: HCPCS | Performed by: INTERNAL MEDICINE

## 2024-04-08 PROCEDURE — G8427 DOCREV CUR MEDS BY ELIG CLIN: HCPCS | Performed by: INTERNAL MEDICINE

## 2024-04-08 PROCEDURE — 1036F TOBACCO NON-USER: CPT | Performed by: INTERNAL MEDICINE

## 2024-04-08 ASSESSMENT — ENCOUNTER SYMPTOMS
ABDOMINAL PAIN: 0
SHORTNESS OF BREATH: 0

## 2024-04-08 NOTE — PROGRESS NOTES
[facility] CARDIOLOGY  [address] [place] [address], [address]  [place] [address]  PHONE: [phone]      24    NAME:  Josefina Hernandez  : 1945  MRN: [id number]         SUBJECTIVE:   Josefina Hernandez is a 78 y.o. female seen for a follow up visit regarding the following:     Chief Complaint   Patient presents with    Results     Had monitor placed 3/7/24    Palpitations            HPI:  Follow up  Results (Had monitor placed 3/7/24) and Palpitations   .    Ms. Hernandez presents today for follow-up.  She stable from cardiac standpoint today.  She states that since she started the metoprolol, her symptoms are much improved.  We reviewed her Holter monitor which showed no significant dysrhythmia.  She has no new complaints today.  She did have a sleep study recently, has not got the results from this yet.    Palpitations   Pertinent negatives include no diaphoresis, fever or shortness of breath.           Cardiac Medications       Calcium Channel Blockers       amLODIPine (NORVASC) 10 MG tablet Take 1 tablet by mouth daily TAKE 1 TABLET BY MOUTH DAILY       Beta Blockers Cardio-Selective       metoprolol succinate (TOPROL XL) 25 MG extended release tablet Take 0.5 tablets by mouth daily       HMG CoA Reductase Inhibitors       rosuvastatin (CRESTOR) 10 MG tablet Take 1 tablet by mouth daily TAKE 1 TABLET BY MOUTH EVERY NIGHT       Angiotensin II Receptor Antagonists       losartan (COZAAR) 50 MG tablet Take 1 tablet by mouth 2 times daily          Diabetic Medications       Biguanides       metFORMIN (GLUCOPHAGE) 500 MG tablet Take 1 tablet by mouth 2 times daily (with meals)                Past Medical History, Past Surgical History, Family history, Social History, and Medications were all reviewed with the patient today and updated as necessary.     Prior to Admission medications    Medication Sig Start Date End Date Taking? Authorizing Provider   sucralfate (CARAFATE) 1 GM tablet Take 1 tablet by

## 2024-04-15 ENCOUNTER — TELEPHONE (OUTPATIENT)
Dept: SLEEP MEDICINE | Age: 79
End: 2024-04-15

## 2024-04-15 DIAGNOSIS — G47.33 OSA (OBSTRUCTIVE SLEEP APNEA): Primary | ICD-10-CM

## 2024-04-15 NOTE — TELEPHONE ENCOUNTER
Patient had recent sleep study showing moderate sleep apnea. Cpap therapy is recommended per sleep interp.  Patient has agreed to start CPAP therapy. Order sent to [facility].     Yesenia Olmstead MA

## 2024-04-16 ENCOUNTER — TELEPHONE (OUTPATIENT)
Dept: FAMILY MEDICINE CLINIC | Facility: CLINIC | Age: 79
End: 2024-04-16

## 2024-04-16 NOTE — TELEPHONE ENCOUNTER
Pt called stating the med Carafate is making her constipated.  She has tried Miralax with no relieve. Please advise. Thanks

## 2024-04-25 ENCOUNTER — TELEPHONE (OUTPATIENT)
Dept: SLEEP MEDICINE | Age: 79
End: 2024-04-25

## 2024-04-28 PROBLEM — G47.33 OBSTRUCTIVE SLEEP APNEA: Status: ACTIVE | Noted: 2024-04-28

## 2024-04-28 NOTE — PROGRESS NOTES
diagnosed on sleep study 04/2024.  Now started on CPAP.          Review of Systems   Constitutional:  Positive for fatigue. Negative for chills and fever.   Respiratory:  Negative for chest tightness and shortness of breath.    Genitourinary:  Negative for difficulty urinating, dysuria and flank pain.        /68 Comment: 5 minute recheck  Pulse 68   Ht 1.549 m (5' 1\")   Wt 67.6 kg (149 lb)   SpO2 99%   BMI 28.15 kg/m²     Physical Exam  Constitutional:       Appearance: Normal appearance.   Cardiovascular:      Rate and Rhythm: Normal rate and regular rhythm.      Heart sounds: Normal heart sounds. No murmur heard.  Pulmonary:      Effort: Pulmonary effort is normal.   Abdominal:      General: There is no distension.      Palpations: There is no mass.      Tenderness: There is no abdominal tenderness. There is no guarding.      Hernia: No hernia is present.   Musculoskeletal:      Cervical back: Neck supple.      Right lower leg: No edema.      Left lower leg: No edema.   Neurological:      General: No focal deficit present.      Mental Status: She is alert and oriented to person, place, and time.   Psychiatric:         Mood and Affect: Mood normal.         Behavior: Behavior normal.            Hemoglobin A1C, POC   Date Value Ref Range Status   04/29/2024 5.8 % Final   01/18/2024 6.0 % Final   09/18/2023 6.2 % Final          1. Essential hypertension  Assessment & Plan:  Blood pressure is at goal for age.  Encouraged continued medication compliance, DASH or Mediterranean diet and daily physical activity.  -Normal ECHO in 2021 except for mild mitral regurgitation  -normal cardiac monitoring 2024.  Metoprolol has relieved palpitations.   Orders:  -     CBC with Auto Differential; Future  -     Comprehensive Metabolic Panel; Future  -     amLODIPine (NORVASC) 10 MG tablet; Take 1 tablet by mouth daily TAKE 1 TABLET BY MOUTH DAILY, Disp-90 tablet, R-2Normal  -     losartan (COZAAR) 50 MG tablet; Take 1

## 2024-04-29 ENCOUNTER — OFFICE VISIT (OUTPATIENT)
Dept: FAMILY MEDICINE CLINIC | Facility: CLINIC | Age: 79
End: 2024-04-29
Payer: MEDICARE

## 2024-04-29 VITALS
DIASTOLIC BLOOD PRESSURE: 68 MMHG | HEART RATE: 68 BPM | HEIGHT: 61 IN | SYSTOLIC BLOOD PRESSURE: 138 MMHG | OXYGEN SATURATION: 99 % | BODY MASS INDEX: 28.13 KG/M2 | WEIGHT: 149 LBS

## 2024-04-29 DIAGNOSIS — R00.0 RACING HEART BEAT: ICD-10-CM

## 2024-04-29 DIAGNOSIS — E53.8 B12 DEFICIENCY: ICD-10-CM

## 2024-04-29 DIAGNOSIS — E03.9 ACQUIRED HYPOTHYROIDISM: ICD-10-CM

## 2024-04-29 DIAGNOSIS — J45.30 MILD PERSISTENT ASTHMA WITHOUT COMPLICATION: ICD-10-CM

## 2024-04-29 DIAGNOSIS — E55.9 VITAMIN D DEFICIENCY: ICD-10-CM

## 2024-04-29 DIAGNOSIS — I10 ESSENTIAL HYPERTENSION: ICD-10-CM

## 2024-04-29 DIAGNOSIS — R00.2 PALPITATIONS: ICD-10-CM

## 2024-04-29 DIAGNOSIS — E83.42 HYPOMAGNESEMIA: ICD-10-CM

## 2024-04-29 DIAGNOSIS — E11.21 TYPE 2 DIABETES MELLITUS WITH NEPHROPATHY (HCC): ICD-10-CM

## 2024-04-29 DIAGNOSIS — K51.00 ULCERATIVE PANCOLITIS WITHOUT COMPLICATION (HCC): ICD-10-CM

## 2024-04-29 DIAGNOSIS — I10 ESSENTIAL HYPERTENSION: Primary | ICD-10-CM

## 2024-04-29 DIAGNOSIS — N18.31 STAGE 3A CHRONIC KIDNEY DISEASE (HCC): ICD-10-CM

## 2024-04-29 DIAGNOSIS — E78.2 MIXED HYPERLIPIDEMIA: ICD-10-CM

## 2024-04-29 DIAGNOSIS — G47.33 OBSTRUCTIVE SLEEP APNEA: ICD-10-CM

## 2024-04-29 LAB
25(OH)D3 SERPL-MCNC: 26.7 NG/ML (ref 30–100)
ALBUMIN SERPL-MCNC: 3.7 G/DL (ref 3.2–4.6)
ALBUMIN/GLOB SERPL: 1 (ref 1–1.9)
ALP SERPL-CCNC: 67 U/L (ref 35–104)
ALT SERPL-CCNC: 12 U/L (ref 12–65)
ANION GAP SERPL CALC-SCNC: 13 MMOL/L (ref 9–18)
AST SERPL-CCNC: 21 U/L (ref 15–37)
BASOPHILS # BLD: 0 K/UL (ref 0–0.2)
BASOPHILS NFR BLD: 1 % (ref 0–2)
BILIRUB SERPL-MCNC: 0.3 MG/DL (ref 0–1.2)
BUN SERPL-MCNC: 17 MG/DL (ref 8–23)
CALCIUM SERPL-MCNC: 9.2 MG/DL (ref 8.8–10.2)
CHLORIDE SERPL-SCNC: 103 MMOL/L (ref 98–107)
CO2 SERPL-SCNC: 22 MMOL/L (ref 20–28)
CREAT SERPL-MCNC: 0.91 MG/DL (ref 0.6–1.1)
DIFFERENTIAL METHOD BLD: ABNORMAL
EOSINOPHIL # BLD: 0.2 K/UL (ref 0–0.8)
EOSINOPHIL NFR BLD: 3 % (ref 0.5–7.8)
ERYTHROCYTE [DISTWIDTH] IN BLOOD BY AUTOMATED COUNT: 14.3 % (ref 11.9–14.6)
GLOBULIN SER CALC-MCNC: 3.7 G/DL (ref 2.3–3.5)
GLUCOSE SERPL-MCNC: 93 MG/DL (ref 70–99)
HBA1C MFR BLD: 5.8 %
HCT VFR BLD AUTO: 35.5 % (ref 35.8–46.3)
HGB BLD-MCNC: 11.1 G/DL (ref 11.7–15.4)
IMM GRANULOCYTES # BLD AUTO: 0 K/UL (ref 0–0.5)
IMM GRANULOCYTES NFR BLD AUTO: 0 % (ref 0–5)
LYMPHOCYTES # BLD: 2.1 K/UL (ref 0.5–4.6)
LYMPHOCYTES NFR BLD: 37 % (ref 13–44)
MCH RBC QN AUTO: 26.8 PG (ref 26.1–32.9)
MCHC RBC AUTO-ENTMCNC: 31.3 G/DL (ref 31.4–35)
MCV RBC AUTO: 85.7 FL (ref 82–102)
MONOCYTES # BLD: 0.5 K/UL (ref 0.1–1.3)
MONOCYTES NFR BLD: 10 % (ref 4–12)
NEUTS SEG # BLD: 2.7 K/UL (ref 1.7–8.2)
NEUTS SEG NFR BLD: 49 % (ref 43–78)
NRBC # BLD: 0 K/UL (ref 0–0.2)
PLATELET # BLD AUTO: 200 K/UL (ref 150–450)
PMV BLD AUTO: 12.2 FL (ref 9.4–12.3)
POTASSIUM SERPL-SCNC: 4.2 MMOL/L (ref 3.5–5.1)
PROT SERPL-MCNC: 7.4 G/DL (ref 6.3–8.2)
RBC # BLD AUTO: 4.14 M/UL (ref 4.05–5.2)
SODIUM SERPL-SCNC: 138 MMOL/L (ref 136–145)
TSH, 3RD GENERATION: 1.23 UIU/ML (ref 0.27–4.2)
VIT B12 SERPL-MCNC: 452 PG/ML (ref 193–986)
WBC # BLD AUTO: 5.5 K/UL (ref 4.3–11.1)

## 2024-04-29 PROCEDURE — G8417 CALC BMI ABV UP PARAM F/U: HCPCS | Performed by: FAMILY MEDICINE

## 2024-04-29 PROCEDURE — 1090F PRES/ABSN URINE INCON ASSESS: CPT | Performed by: FAMILY MEDICINE

## 2024-04-29 PROCEDURE — 96372 THER/PROPH/DIAG INJ SC/IM: CPT | Performed by: FAMILY MEDICINE

## 2024-04-29 PROCEDURE — 1036F TOBACCO NON-USER: CPT | Performed by: FAMILY MEDICINE

## 2024-04-29 PROCEDURE — 99214 OFFICE O/P EST MOD 30 MIN: CPT | Performed by: FAMILY MEDICINE

## 2024-04-29 PROCEDURE — G8427 DOCREV CUR MEDS BY ELIG CLIN: HCPCS | Performed by: FAMILY MEDICINE

## 2024-04-29 PROCEDURE — 1123F ACP DISCUSS/DSCN MKR DOCD: CPT | Performed by: FAMILY MEDICINE

## 2024-04-29 PROCEDURE — G8399 PT W/DXA RESULTS DOCUMENT: HCPCS | Performed by: FAMILY MEDICINE

## 2024-04-29 PROCEDURE — 83036 HEMOGLOBIN GLYCOSYLATED A1C: CPT | Performed by: FAMILY MEDICINE

## 2024-04-29 PROCEDURE — 3075F SYST BP GE 130 - 139MM HG: CPT | Performed by: FAMILY MEDICINE

## 2024-04-29 PROCEDURE — 3078F DIAST BP <80 MM HG: CPT | Performed by: FAMILY MEDICINE

## 2024-04-29 RX ORDER — LOSARTAN POTASSIUM 50 MG/1
50 TABLET ORAL 2 TIMES DAILY
Qty: 90 TABLET | Refills: 2 | Status: SHIPPED | OUTPATIENT
Start: 2024-04-29

## 2024-04-29 RX ORDER — METOPROLOL SUCCINATE 25 MG/1
12.5 TABLET, EXTENDED RELEASE ORAL DAILY
Qty: 45 TABLET | Refills: 1 | Status: SHIPPED | OUTPATIENT
Start: 2024-04-29

## 2024-04-29 RX ORDER — ROSUVASTATIN CALCIUM 10 MG/1
10 TABLET, COATED ORAL DAILY
Qty: 90 TABLET | Refills: 2 | Status: SHIPPED | OUTPATIENT
Start: 2024-04-29

## 2024-04-29 RX ORDER — CYANOCOBALAMIN 1000 UG/ML
1000 INJECTION, SOLUTION INTRAMUSCULAR; SUBCUTANEOUS ONCE
Status: COMPLETED | OUTPATIENT
Start: 2024-04-29 | End: 2024-04-29

## 2024-04-29 RX ORDER — ALBUTEROL SULFATE 90 UG/1
2 AEROSOL, METERED RESPIRATORY (INHALATION) 4 TIMES DAILY PRN
Qty: 18 G | Refills: 3 | Status: SHIPPED | OUTPATIENT
Start: 2024-04-29

## 2024-04-29 RX ORDER — LEVOTHYROXINE SODIUM 0.05 MG/1
50 TABLET ORAL DAILY
Qty: 90 TABLET | Refills: 2 | Status: SHIPPED | OUTPATIENT
Start: 2024-04-29

## 2024-04-29 RX ORDER — ACETAMINOPHEN 160 MG
2000 TABLET,DISINTEGRATING ORAL DAILY
Qty: 90 CAPSULE | Refills: 2 | Status: SHIPPED | OUTPATIENT
Start: 2024-04-29

## 2024-04-29 RX ORDER — POTASSIUM CHLORIDE 20 MEQ/1
10 TABLET, EXTENDED RELEASE ORAL 2 TIMES DAILY
Qty: 180 TABLET | Refills: 2 | Status: SHIPPED | OUTPATIENT
Start: 2024-04-29

## 2024-04-29 RX ORDER — AMLODIPINE BESYLATE 10 MG/1
10 TABLET ORAL DAILY
Qty: 90 TABLET | Refills: 2 | Status: SHIPPED | OUTPATIENT
Start: 2024-04-29

## 2024-04-29 RX ORDER — LANOLIN ALCOHOL/MO/W.PET/CERES
400 CREAM (GRAM) TOPICAL DAILY
Qty: 90 TABLET | Refills: 2 | Status: SHIPPED | OUTPATIENT
Start: 2024-04-29

## 2024-04-29 RX ADMIN — CYANOCOBALAMIN 1000 MCG: 1000 INJECTION, SOLUTION INTRAMUSCULAR; SUBCUTANEOUS at 11:53

## 2024-04-29 SDOH — ECONOMIC STABILITY: FOOD INSECURITY: WITHIN THE PAST 12 MONTHS, YOU WORRIED THAT YOUR FOOD WOULD RUN OUT BEFORE YOU GOT MONEY TO BUY MORE.: NEVER TRUE

## 2024-04-29 SDOH — ECONOMIC STABILITY: FOOD INSECURITY: WITHIN THE PAST 12 MONTHS, THE FOOD YOU BOUGHT JUST DIDN'T LAST AND YOU DIDN'T HAVE MONEY TO GET MORE.: NEVER TRUE

## 2024-04-29 SDOH — ECONOMIC STABILITY: INCOME INSECURITY: HOW HARD IS IT FOR YOU TO PAY FOR THE VERY BASICS LIKE FOOD, HOUSING, MEDICAL CARE, AND HEATING?: NOT HARD AT ALL

## 2024-04-29 ASSESSMENT — PATIENT HEALTH QUESTIONNAIRE - PHQ9
2. FEELING DOWN, DEPRESSED OR HOPELESS: NOT AT ALL
SUM OF ALL RESPONSES TO PHQ9 QUESTIONS 1 & 2: 0
SUM OF ALL RESPONSES TO PHQ QUESTIONS 1-9: 0
1. LITTLE INTEREST OR PLEASURE IN DOING THINGS: NOT AT ALL
SUM OF ALL RESPONSES TO PHQ QUESTIONS 1-9: 0

## 2024-04-29 ASSESSMENT — ENCOUNTER SYMPTOMS
SHORTNESS OF BREATH: 0
CHEST TIGHTNESS: 0

## 2024-04-29 NOTE — ASSESSMENT & PLAN NOTE
Blood pressure is at goal for age.  Encouraged continued medication compliance, DASH or Mediterranean diet and daily physical activity.  -Normal ECHO in 2021 except for mild mitral regurgitation  -normal cardiac monitoring 2024.  Metoprolol has relieved palpitations.

## 2024-04-29 NOTE — ASSESSMENT & PLAN NOTE
Patient is taking statin without side effect.  Discussed benefits of statin in prevention of coronary artery and cerebrovascular disease.  Last LDL was 76

## 2024-04-29 NOTE — TELEPHONE ENCOUNTER
LOV as new pt: 09/08/2023  Just set up on cpap 04/22/2024  Study was on  08/16/2023  Please Advise    
Patient says she started her new cpap Tuesday night and last night she is having trouble with the air leak with the Gail full face mask  and her tank is running out of water.   
Please decrease her pressure to 4-6cm H2O. Her mask leak appears to be the main issue. Could she get a mask fit appt?     Thank you so much!   
decreased  her pressure to 4-6cm H2O per Laverne. In airview  Order placed in [employer] for  MASK FIT   Called pt made her aware. Pt voiced understanding.  
Implemented All Universal Safety Interventions:  [place] to call system. Call bell, personal items and telephone within reach. Instruct patient to call for assistance. Room bathroom lighting operational. Non-slip footwear when patient is off stretcher. Physically safe environment: no spills, clutter or unnecessary equipment. Stretcher in lowest position, wheels locked, appropriate side rails in place.

## 2024-05-22 ENCOUNTER — TELEPHONE (OUTPATIENT)
Dept: SLEEP MEDICINE | Age: 79
End: 2024-05-22

## 2024-05-22 DIAGNOSIS — R10.13 EPIGASTRIC PAIN: ICD-10-CM

## 2024-05-22 RX ORDER — SUCRALFATE 1 G/1
1 TABLET ORAL 4 TIMES DAILY
Qty: 360 TABLET | OUTPATIENT
Start: 2024-05-22

## 2024-05-22 NOTE — TELEPHONE ENCOUNTER
Patient is asking if she needs to pour out the water in her cpap every night or cvan she just pour some more water in there

## 2024-05-23 NOTE — TELEPHONE ENCOUNTER
Called pt explained to her all of the water should be changed every night. Pt voiced understanding.

## 2024-05-29 ENCOUNTER — NURSE ONLY (OUTPATIENT)
Dept: FAMILY MEDICINE CLINIC | Facility: CLINIC | Age: 79
End: 2024-05-29
Payer: MEDICARE

## 2024-05-29 DIAGNOSIS — E53.8 B12 DEFICIENCY: Primary | ICD-10-CM

## 2024-05-29 PROCEDURE — 1123F ACP DISCUSS/DSCN MKR DOCD: CPT | Performed by: FAMILY MEDICINE

## 2024-05-29 PROCEDURE — 96372 THER/PROPH/DIAG INJ SC/IM: CPT | Performed by: FAMILY MEDICINE

## 2024-05-29 PROCEDURE — 99212 OFFICE O/P EST SF 10 MIN: CPT | Performed by: FAMILY MEDICINE

## 2024-05-29 RX ORDER — CYANOCOBALAMIN 1000 UG/ML
1000 INJECTION, SOLUTION INTRAMUSCULAR; SUBCUTANEOUS ONCE
Qty: 1 ML | Refills: 0 | Status: SHIPPED | OUTPATIENT
Start: 2024-05-29 | End: 2024-05-29 | Stop reason: SDUPTHER

## 2024-05-29 RX ORDER — CYANOCOBALAMIN 1000 UG/ML
1000 INJECTION, SOLUTION INTRAMUSCULAR; SUBCUTANEOUS ONCE
Status: COMPLETED | OUTPATIENT
Start: 2024-05-29 | End: 2024-05-29

## 2024-05-29 RX ADMIN — CYANOCOBALAMIN 1000 MCG: 1000 INJECTION, SOLUTION INTRAMUSCULAR; SUBCUTANEOUS at 11:03

## 2024-05-29 NOTE — PROGRESS NOTES
Patient presented to [facility] this morning for her monthly B12 injection per Dr. Olmstead.   Injection administered in L deltoid. Tolerated well. Scheduled next B12 injection for 6/26 at 11am.

## 2024-06-06 ENCOUNTER — OFFICE VISIT (OUTPATIENT)
Dept: SLEEP MEDICINE | Age: 79
End: 2024-06-06
Payer: MEDICARE

## 2024-06-06 ENCOUNTER — TELEPHONE (OUTPATIENT)
Dept: SLEEP MEDICINE | Age: 79
End: 2024-06-06

## 2024-06-06 VITALS
BODY MASS INDEX: 28.72 KG/M2 | WEIGHT: 152 LBS | OXYGEN SATURATION: 98 % | DIASTOLIC BLOOD PRESSURE: 71 MMHG | HEART RATE: 65 BPM | SYSTOLIC BLOOD PRESSURE: 117 MMHG

## 2024-06-06 DIAGNOSIS — G47.33 OSA (OBSTRUCTIVE SLEEP APNEA): Primary | ICD-10-CM

## 2024-06-06 DIAGNOSIS — G47.34 NOCTURNAL HYPOXEMIA: ICD-10-CM

## 2024-06-06 PROCEDURE — 3078F DIAST BP <80 MM HG: CPT | Performed by: NURSE PRACTITIONER

## 2024-06-06 PROCEDURE — 1036F TOBACCO NON-USER: CPT | Performed by: NURSE PRACTITIONER

## 2024-06-06 PROCEDURE — 3074F SYST BP LT 130 MM HG: CPT | Performed by: NURSE PRACTITIONER

## 2024-06-06 PROCEDURE — G8417 CALC BMI ABV UP PARAM F/U: HCPCS | Performed by: NURSE PRACTITIONER

## 2024-06-06 PROCEDURE — 1090F PRES/ABSN URINE INCON ASSESS: CPT | Performed by: NURSE PRACTITIONER

## 2024-06-06 PROCEDURE — G8427 DOCREV CUR MEDS BY ELIG CLIN: HCPCS | Performed by: NURSE PRACTITIONER

## 2024-06-06 PROCEDURE — 1123F ACP DISCUSS/DSCN MKR DOCD: CPT | Performed by: NURSE PRACTITIONER

## 2024-06-06 PROCEDURE — G8399 PT W/DXA RESULTS DOCUMENT: HCPCS | Performed by: NURSE PRACTITIONER

## 2024-06-06 PROCEDURE — 99213 OFFICE O/P EST LOW 20 MIN: CPT | Performed by: NURSE PRACTITIONER

## 2024-06-06 ASSESSMENT — SLEEP AND FATIGUE QUESTIONNAIRES
HOW LIKELY ARE YOU TO NOD OFF OR FALL ASLEEP WHILE SITTING QUIETLY AFTER LUNCH WITHOUT ALCOHOL: MODERATE CHANCE OF DOZING
HOW LIKELY ARE YOU TO NOD OFF OR FALL ASLEEP WHILE SITTING AND READING: WOULD NEVER DOZE
HOW LIKELY ARE YOU TO NOD OFF OR FALL ASLEEP WHILE LYING DOWN TO REST IN THE AFTERNOON WHEN CIRCUMSTANCES PERMIT: HIGH CHANCE OF DOZING
HOW LIKELY ARE YOU TO NOD OFF OR FALL ASLEEP WHILE WATCHING TV: MODERATE CHANCE OF DOZING
HOW LIKELY ARE YOU TO NOD OFF OR FALL ASLEEP IN A CAR, WHILE STOPPED FOR A FEW MINUTES IN TRAFFIC: WOULD NEVER DOZE
HOW LIKELY ARE YOU TO NOD OFF OR FALL ASLEEP WHEN YOU ARE A PASSENGER IN A CAR FOR AN HOUR WITHOUT A BREAK: HIGH CHANCE OF DOZING
HOW LIKELY ARE YOU TO NOD OFF OR FALL ASLEEP WHILE SITTING INACTIVE IN A PUBLIC PLACE: MODERATE CHANCE OF DOZING
ESS TOTAL SCORE: 12
HOW LIKELY ARE YOU TO NOD OFF OR FALL ASLEEP WHILE SITTING AND TALKING TO SOMEONE: WOULD NEVER DOZE

## 2024-06-06 NOTE — PROGRESS NOTES
[facility]  [address]  [place] [address]  [phone]    Patient Name:  Josefina Hernandez  YOB: 1945      Office Visit 6/6/2024    CHIEF COMPLAINT:    Chief Complaint   Patient presents with    Sleep Apnea    Follow-up         HISTORY OF PRESENT ILLNESS:  Patient is a 77 yo female seen today for follow up of MICHAEL.  Diagnostic sleep study on 08/16/2023 with an AHI of 13.2 and lowest oxygen saturation of 81%. She is prescribed cpap therapy with a humidifier set at 4-6 cm with a nasal pillow mask. Most recent download reveals AHI on PAP therapy is 4.4, leak is median 20.5 and 37.0 at 95th percentile and the hourly usage is 5 hours 15 minutes nightly. The overall use is 220 hours with days greater than four hours at 34/43. The patient is compliant with the Pap therapy and is feeling better as a result.    This is her first return visit since starting CPAP.  Her overall compliance is good with 79% usage greater than 4 hours.  She reports that she does feel better upon awakening in the morning after starting CPAP.  She denies having any problems with excessive daytime sleepiness.  However her [place] score is 12/24.  She does report that she usually only gets about 6 hours of sleep at night because she likes to stay up until 12 AM and she has to awaken in the morning at 6 AM to take her thyroid pill.  I did advise her to try to work to get more than 6 hours of sleep nightly.  She denies any major medical changes since her last visit.  Reports that her weight has consistently been around 150 pounds.  Her blood pressure is well-controlled today.          [place] Sleepiness Scale      6/6/2024     1:06 PM 9/8/2023    10:31 AM   Sleep Medicine   Sitting and reading 0 3   Watching TV 2 2   Sitting, inactive in a public place (e.g. a theatre or a meeting) 2 0   As a passenger in a car for an hour without a break 3 1   Lying down to rest in the afternoon when circumstances

## 2024-06-28 ENCOUNTER — NURSE ONLY (OUTPATIENT)
Dept: FAMILY MEDICINE CLINIC | Facility: CLINIC | Age: 79
End: 2024-06-28
Payer: MEDICARE

## 2024-06-28 DIAGNOSIS — E53.8 B12 DEFICIENCY: Primary | ICD-10-CM

## 2024-06-28 PROCEDURE — 96372 THER/PROPH/DIAG INJ SC/IM: CPT | Performed by: FAMILY MEDICINE

## 2024-06-28 RX ORDER — CYANOCOBALAMIN 1000 UG/ML
1000 INJECTION, SOLUTION INTRAMUSCULAR; SUBCUTANEOUS ONCE
Status: COMPLETED | OUTPATIENT
Start: 2024-06-28 | End: 2024-06-28

## 2024-06-28 RX ADMIN — CYANOCOBALAMIN 1000 MCG: 1000 INJECTION, SOLUTION INTRAMUSCULAR; SUBCUTANEOUS at 10:59

## 2024-06-28 ASSESSMENT — PATIENT HEALTH QUESTIONNAIRE - PHQ9
SUM OF ALL RESPONSES TO PHQ QUESTIONS 1-9: 0
2. FEELING DOWN, DEPRESSED OR HOPELESS: NOT AT ALL
SUM OF ALL RESPONSES TO PHQ QUESTIONS 1-9: 0
1. LITTLE INTEREST OR PLEASURE IN DOING THINGS: NOT AT ALL
SUM OF ALL RESPONSES TO PHQ9 QUESTIONS 1 & 2: 0
SUM OF ALL RESPONSES TO PHQ QUESTIONS 1-9: 0
SUM OF ALL RESPONSES TO PHQ QUESTIONS 1-9: 0

## 2024-07-24 DIAGNOSIS — G47.34 NOCTURNAL HYPOXEMIA: ICD-10-CM

## 2024-07-25 ENCOUNTER — TELEPHONE (OUTPATIENT)
Dept: SLEEP MEDICINE | Age: 79
End: 2024-07-25

## 2024-08-02 ENCOUNTER — OFFICE VISIT (OUTPATIENT)
Dept: FAMILY MEDICINE CLINIC | Facility: CLINIC | Age: 79
End: 2024-08-02

## 2024-08-02 ENCOUNTER — FOLLOWUP TELEPHONE ENCOUNTER (OUTPATIENT)
Dept: DIABETES SERVICES | Age: 79
End: 2024-08-02

## 2024-08-02 VITALS
WEIGHT: 152 LBS | BODY MASS INDEX: 28.7 KG/M2 | SYSTOLIC BLOOD PRESSURE: 132 MMHG | DIASTOLIC BLOOD PRESSURE: 70 MMHG | HEART RATE: 67 BPM | HEIGHT: 61 IN | OXYGEN SATURATION: 99 %

## 2024-08-02 DIAGNOSIS — E03.9 ACQUIRED HYPOTHYROIDISM: ICD-10-CM

## 2024-08-02 DIAGNOSIS — E53.8 B12 DEFICIENCY: ICD-10-CM

## 2024-08-02 DIAGNOSIS — E11.21 TYPE 2 DIABETES MELLITUS WITH NEPHROPATHY (HCC): ICD-10-CM

## 2024-08-02 DIAGNOSIS — E55.9 VITAMIN D DEFICIENCY: ICD-10-CM

## 2024-08-02 DIAGNOSIS — N18.31 STAGE 3A CHRONIC KIDNEY DISEASE (HCC): ICD-10-CM

## 2024-08-02 DIAGNOSIS — E11.9 ENCOUNTER FOR DIABETIC FOOT EXAM (HCC): ICD-10-CM

## 2024-08-02 DIAGNOSIS — I10 ESSENTIAL HYPERTENSION: Primary | ICD-10-CM

## 2024-08-02 LAB — HBA1C MFR BLD: 6.2 %

## 2024-08-02 RX ORDER — CYANOCOBALAMIN 1000 UG/ML
1000 INJECTION, SOLUTION INTRAMUSCULAR; SUBCUTANEOUS ONCE
Status: COMPLETED | OUTPATIENT
Start: 2024-08-02 | End: 2024-08-02

## 2024-08-02 RX ADMIN — CYANOCOBALAMIN 1000 MCG: 1000 INJECTION, SOLUTION INTRAMUSCULAR; SUBCUTANEOUS at 11:15

## 2024-08-02 ASSESSMENT — ENCOUNTER SYMPTOMS
SHORTNESS OF BREATH: 0
CHEST TIGHTNESS: 0

## 2024-08-02 ASSESSMENT — PATIENT HEALTH QUESTIONNAIRE - PHQ9
1. LITTLE INTEREST OR PLEASURE IN DOING THINGS: NOT AT ALL
SUM OF ALL RESPONSES TO PHQ9 QUESTIONS 1 & 2: 0
SUM OF ALL RESPONSES TO PHQ QUESTIONS 1-9: 0
SUM OF ALL RESPONSES TO PHQ QUESTIONS 1-9: 0
2. FEELING DOWN, DEPRESSED OR HOPELESS: NOT AT ALL
SUM OF ALL RESPONSES TO PHQ QUESTIONS 1-9: 0
SUM OF ALL RESPONSES TO PHQ QUESTIONS 1-9: 0

## 2024-08-02 NOTE — PROGRESS NOTES
Hypothyroidism 9/27/2012    Hypothyroidism due to medication 7/1/2015    Inflammatory bowel disease 7/1/2015    Palpitations     Personal history of asthma 8/24/2016    Syncope 12/8/2016    Thrombocytopenia (HCC) 12/10/2016    Type 2 diabetes mellitus without complication (HCC) 7/1/2015    Ulcerative colitis (HCC)     Uncomplicated asthma 9/29/2020       Past Surgical History:   Procedure Laterality Date    CARPAL TUNNEL RELEASE      CATARACT REMOVAL      to L    CHOLECYSTECTOMY, LAPAROSCOPIC      HEENT      shunt to L eye r/t glaucoma    ORTHOPEDIC SURGERY      to back    THYROIDECTOMY, PARTIAL         Social History     Tobacco Use    Smoking status: Never    Smokeless tobacco: Never   Substance Use Topics    Alcohol use: No        Family History   Problem Relation Age of Onset    Heart Disease Neg Hx     Psychiatric Disorder Other         Anxiety    Blindness Mother 96    Hypertension Mother     Diabetes Mother     Cancer Neg Hx     Breast Cancer Neg Hx        Patient is a 78-year-old female here today for routine follow up.      She has the following chronic problems:  -ulcerative colitis: established with [facility].  On stelara infusion.   -type 2 diabetes:  has been well controlled with metformin.   -shortness of breath:  She has prior history of asthma.  Seen by pulmonary and started on CPAP for MICHAEL. She did not tolerate CPAP.  Also ordered complete PFTs to evaluate for asthma concern for more restrictive pattern.  PFTs repeated this month but has not received results.    -vitamin D deficiency: mild.  Recommend OTC replacement.  Not taking  -b12 deficiency:  seen on blood work.  Receiving monthly injections.  Last check adequately replaced.   -palpitations: worse in the morning upon awakening.  Recently wore extended monitor 04/2024 which was normal.  Metoprolol is helping with symptoms  -sleep apnea: diagnosed on sleep study 04/2024.  Now started on CPAP.        Review of Systems   Constitutional:

## 2024-09-06 ENCOUNTER — NURSE ONLY (OUTPATIENT)
Dept: FAMILY MEDICINE CLINIC | Facility: CLINIC | Age: 79
End: 2024-09-06

## 2024-09-06 DIAGNOSIS — E53.8 B12 DEFICIENCY: Primary | ICD-10-CM

## 2024-09-06 RX ORDER — CYANOCOBALAMIN 1000 UG/ML
1000 INJECTION, SOLUTION INTRAMUSCULAR; SUBCUTANEOUS ONCE
Status: COMPLETED | OUTPATIENT
Start: 2024-09-06 | End: 2024-09-06

## 2024-09-06 RX ADMIN — CYANOCOBALAMIN 1000 MCG: 1000 INJECTION, SOLUTION INTRAMUSCULAR; SUBCUTANEOUS at 10:04

## 2024-10-03 ENCOUNTER — TELEPHONE (OUTPATIENT)
Dept: FAMILY MEDICINE CLINIC | Facility: CLINIC | Age: 79
End: 2024-10-03

## 2024-10-09 ENCOUNTER — OFFICE VISIT (OUTPATIENT)
Age: 79
End: 2024-10-09
Payer: MEDICARE

## 2024-10-09 VITALS
WEIGHT: 153 LBS | DIASTOLIC BLOOD PRESSURE: 68 MMHG | SYSTOLIC BLOOD PRESSURE: 126 MMHG | HEIGHT: 61 IN | BODY MASS INDEX: 28.89 KG/M2 | HEART RATE: 76 BPM

## 2024-10-09 DIAGNOSIS — R00.2 PALPITATIONS: Primary | ICD-10-CM

## 2024-10-09 DIAGNOSIS — E78.2 MIXED HYPERLIPIDEMIA: ICD-10-CM

## 2024-10-09 DIAGNOSIS — I10 BENIGN ESSENTIAL HTN: ICD-10-CM

## 2024-10-09 PROCEDURE — 1123F ACP DISCUSS/DSCN MKR DOCD: CPT | Performed by: INTERNAL MEDICINE

## 2024-10-09 PROCEDURE — G8399 PT W/DXA RESULTS DOCUMENT: HCPCS | Performed by: INTERNAL MEDICINE

## 2024-10-09 PROCEDURE — G8484 FLU IMMUNIZE NO ADMIN: HCPCS | Performed by: INTERNAL MEDICINE

## 2024-10-09 PROCEDURE — 1090F PRES/ABSN URINE INCON ASSESS: CPT | Performed by: INTERNAL MEDICINE

## 2024-10-09 PROCEDURE — G8417 CALC BMI ABV UP PARAM F/U: HCPCS | Performed by: INTERNAL MEDICINE

## 2024-10-09 PROCEDURE — 99214 OFFICE O/P EST MOD 30 MIN: CPT | Performed by: INTERNAL MEDICINE

## 2024-10-09 PROCEDURE — G8428 CUR MEDS NOT DOCUMENT: HCPCS | Performed by: INTERNAL MEDICINE

## 2024-10-09 PROCEDURE — 1036F TOBACCO NON-USER: CPT | Performed by: INTERNAL MEDICINE

## 2024-10-09 PROCEDURE — 3074F SYST BP LT 130 MM HG: CPT | Performed by: INTERNAL MEDICINE

## 2024-10-09 PROCEDURE — 3078F DIAST BP <80 MM HG: CPT | Performed by: INTERNAL MEDICINE

## 2024-10-09 ASSESSMENT — ENCOUNTER SYMPTOMS
SHORTNESS OF BREATH: 0
ABDOMINAL PAIN: 0

## 2024-10-09 NOTE — PROGRESS NOTES
lungs 4 times daily as needed for Wheezing 4/29/24  Yes Ant Olmstead MD   amLODIPine (NORVASC) 10 MG tablet Take 1 tablet by mouth daily TAKE 1 TABLET BY MOUTH DAILY 4/29/24  Yes Ant Olmstead MD   vitamin D (VITAMIN D3) 50 MCG (2000 UT) CAPS capsule Take 1 capsule by mouth daily 4/29/24  Yes Ant Olmstead MD   levothyroxine (SYNTHROID) 50 MCG tablet Take 1 tablet by mouth Daily TAKE 1 TABLET BY MOUTH DAILY BEFORE BREAKFAST 4/29/24  Yes Ant Olmstead MD   losartan (COZAAR) 50 MG tablet Take 1 tablet by mouth 2 times daily 4/29/24  Yes Ant Olmstead MD   magnesium oxide (MAG-OX) 400 (240 Mg) MG tablet Take 1 tablet by mouth daily 4/29/24  Yes Ant Olmstead MD   metFORMIN (GLUCOPHAGE) 500 MG tablet Take 1 tablet by mouth 2 times daily (with meals) 4/29/24  Yes Ant Olmstead MD   metoprolol succinate (TOPROL XL) 25 MG extended release tablet Take 0.5 tablets by mouth daily 4/29/24  Yes Ant Olmstead MD   potassium chloride (KLOR-CON M) 20 MEQ extended release tablet Take 0.5 tablets by mouth 2 times daily TAKE 1 TABLET BY MOUTH TWICE DAILY 4/29/24  Yes Ant Olmstead MD   rosuvastatin (CRESTOR) 10 MG tablet Take 1 tablet by mouth daily TAKE 1 TABLET BY MOUTH EVERY NIGHT 4/29/24  Yes Ant Olmstead MD   acetaminophen (TYLENOL) 650 MG extended release tablet Take 1 tablet by mouth in the morning and 1 tablet at noon and 1 tablet in the evening.   Yes Automatic Reconciliation, Ar   ustekinumab (STELARA) 130 MG/26ML SOLN Infuse intravenously Every 8 weeks   Yes Automatic Reconciliation, Ar   ondansetron (ZOFRAN-ODT) 4 MG disintegrating tablet Take 1 tablet by mouth 3 times daily as needed for Nausea or Vomiting  Patient not taking: Reported on 4/29/2024 2/15/24   Mechelle Shaw, APRN - CNP   fluticasone (FLONASE) 50 MCG/ACT nasal spray 2 sprays by Nasal route daily  Patient not taking: Reported on 4/29/2024 1/18/24   Olmstead, Ant C, MD   dorzolamide (TRUSOPT) 2 % ophthalmic solution INSTILL 1

## 2024-10-11 ENCOUNTER — NURSE ONLY (OUTPATIENT)
Dept: FAMILY MEDICINE CLINIC | Facility: CLINIC | Age: 79
End: 2024-10-11
Payer: MEDICARE

## 2024-10-11 VITALS — BODY MASS INDEX: 28.91 KG/M2 | HEIGHT: 61 IN | OXYGEN SATURATION: 99 % | HEART RATE: 70 BPM

## 2024-10-11 DIAGNOSIS — E53.8 B12 DEFICIENCY: Primary | ICD-10-CM

## 2024-10-11 PROCEDURE — 96372 THER/PROPH/DIAG INJ SC/IM: CPT | Performed by: FAMILY MEDICINE

## 2024-10-11 PROCEDURE — 1123F ACP DISCUSS/DSCN MKR DOCD: CPT | Performed by: FAMILY MEDICINE

## 2024-10-11 PROCEDURE — 99212 OFFICE O/P EST SF 10 MIN: CPT | Performed by: FAMILY MEDICINE

## 2024-10-11 RX ORDER — CYANOCOBALAMIN 1000 UG/ML
1000 INJECTION, SOLUTION INTRAMUSCULAR; SUBCUTANEOUS ONCE
Status: COMPLETED | OUTPATIENT
Start: 2024-10-11 | End: 2024-10-11

## 2024-10-11 RX ADMIN — CYANOCOBALAMIN 1000 MCG: 1000 INJECTION, SOLUTION INTRAMUSCULAR; SUBCUTANEOUS at 09:10

## 2024-10-11 ASSESSMENT — PATIENT HEALTH QUESTIONNAIRE - PHQ9
SUM OF ALL RESPONSES TO PHQ QUESTIONS 1-9: 0
SUM OF ALL RESPONSES TO PHQ QUESTIONS 1-9: 0
SUM OF ALL RESPONSES TO PHQ9 QUESTIONS 1 & 2: 0
2. FEELING DOWN, DEPRESSED OR HOPELESS: NOT AT ALL
1. LITTLE INTEREST OR PLEASURE IN DOING THINGS: NOT AT ALL
SUM OF ALL RESPONSES TO PHQ QUESTIONS 1-9: 0
SUM OF ALL RESPONSES TO PHQ QUESTIONS 1-9: 0

## 2024-11-11 ENCOUNTER — NURSE ONLY (OUTPATIENT)
Dept: FAMILY MEDICINE CLINIC | Facility: CLINIC | Age: 79
End: 2024-11-11

## 2024-11-11 VITALS — HEART RATE: 70 BPM | OXYGEN SATURATION: 99 %

## 2024-11-11 DIAGNOSIS — E53.8 B12 DEFICIENCY: Primary | ICD-10-CM

## 2024-11-11 RX ORDER — CYANOCOBALAMIN 1000 UG/ML
1000 INJECTION, SOLUTION INTRAMUSCULAR; SUBCUTANEOUS ONCE
Status: COMPLETED | OUTPATIENT
Start: 2024-11-11 | End: 2024-11-11

## 2024-11-11 RX ADMIN — CYANOCOBALAMIN 1000 MCG: 1000 INJECTION, SOLUTION INTRAMUSCULAR; SUBCUTANEOUS at 09:59

## 2024-11-29 ENCOUNTER — OFFICE VISIT (OUTPATIENT)
Dept: FAMILY MEDICINE CLINIC | Facility: CLINIC | Age: 79
End: 2024-11-29

## 2024-11-29 VITALS
WEIGHT: 155 LBS | HEART RATE: 78 BPM | OXYGEN SATURATION: 98 % | TEMPERATURE: 98.2 F | DIASTOLIC BLOOD PRESSURE: 62 MMHG | SYSTOLIC BLOOD PRESSURE: 130 MMHG | BODY MASS INDEX: 29.29 KG/M2

## 2024-11-29 DIAGNOSIS — L65.9 HAIR LOSS: Primary | ICD-10-CM

## 2024-11-29 DIAGNOSIS — E55.9 VITAMIN D DEFICIENCY: ICD-10-CM

## 2024-11-29 DIAGNOSIS — E78.2 MIXED HYPERLIPIDEMIA: ICD-10-CM

## 2024-11-29 DIAGNOSIS — E83.42 HYPOMAGNESEMIA: ICD-10-CM

## 2024-11-29 DIAGNOSIS — G56.02 LEFT CARPAL TUNNEL SYNDROME: ICD-10-CM

## 2024-11-29 DIAGNOSIS — N18.31 STAGE 3A CHRONIC KIDNEY DISEASE (HCC): ICD-10-CM

## 2024-11-29 DIAGNOSIS — I10 ESSENTIAL HYPERTENSION: ICD-10-CM

## 2024-11-29 DIAGNOSIS — E11.21 TYPE 2 DIABETES MELLITUS WITH NEPHROPATHY (HCC): ICD-10-CM

## 2024-11-29 DIAGNOSIS — E53.8 B12 DEFICIENCY: ICD-10-CM

## 2024-11-29 DIAGNOSIS — I73.9 PAD (PERIPHERAL ARTERY DISEASE) (HCC): ICD-10-CM

## 2024-11-29 DIAGNOSIS — E03.9 ACQUIRED HYPOTHYROIDISM: ICD-10-CM

## 2024-11-29 NOTE — PROGRESS NOTES
Josefina Hernandez is a 79 y.o. female who presents today for the following:  Chief Complaint   Patient presents with    GI Problem     Pt presents today with some gi issues and dizziness.        Allergies   Allergen Reactions    Enoxaparin Other (See Comments)     HIT+    Heparin (Porcine) Other (See Comments)     HIT    Morphine Nausea Only     Nausea and stomach pains    Sulfa Antibiotics Nausea And Vomiting       Current Outpatient Medications   Medication Sig Dispense Refill    albuterol sulfate HFA (VENTOLIN HFA) 108 (90 Base) MCG/ACT inhaler Inhale 2 puffs into the lungs 4 times daily as needed for Wheezing 18 g 3    amLODIPine (NORVASC) 10 MG tablet Take 1 tablet by mouth daily TAKE 1 TABLET BY MOUTH DAILY 90 tablet 2    vitamin D (VITAMIN D3) 50 MCG (2000 UT) CAPS capsule Take 1 capsule by mouth daily 90 capsule 2    levothyroxine (SYNTHROID) 50 MCG tablet Take 1 tablet by mouth Daily TAKE 1 TABLET BY MOUTH DAILY BEFORE BREAKFAST 90 tablet 2    losartan (COZAAR) 50 MG tablet Take 1 tablet by mouth 2 times daily 90 tablet 2    magnesium oxide (MAG-OX) 400 (240 Mg) MG tablet Take 1 tablet by mouth daily 90 tablet 2    metFORMIN (GLUCOPHAGE) 500 MG tablet Take 1 tablet by mouth 2 times daily (with meals) 180 tablet 2    metoprolol succinate (TOPROL XL) 25 MG extended release tablet Take 0.5 tablets by mouth daily 45 tablet 1    potassium chloride (KLOR-CON M) 20 MEQ extended release tablet Take 0.5 tablets by mouth 2 times daily TAKE 1 TABLET BY MOUTH TWICE DAILY 180 tablet 2    rosuvastatin (CRESTOR) 10 MG tablet Take 1 tablet by mouth daily TAKE 1 TABLET BY MOUTH EVERY NIGHT 90 tablet 2    ondansetron (ZOFRAN-ODT) 4 MG disintegrating tablet Take 1 tablet by mouth 3 times daily as needed for Nausea or Vomiting 21 tablet 0    fluticasone (FLONASE) 50 MCG/ACT nasal spray 2 sprays by Nasal route daily 16 g 5    dorzolamide (TRUSOPT) 2 % ophthalmic solution       polycarbophil (FIBERCON) 625 MG tablet Take 2

## 2024-11-30 ASSESSMENT — ENCOUNTER SYMPTOMS
CHEST TIGHTNESS: 0
SHORTNESS OF BREATH: 0

## 2024-11-30 NOTE — ASSESSMENT & PLAN NOTE
Receiving monthly b12 injections.  Discussed with patient that can switch to oral supplement in the future if desires.

## 2024-12-06 ENCOUNTER — TELEMEDICINE (OUTPATIENT)
Dept: SLEEP MEDICINE | Age: 79
End: 2024-12-06

## 2024-12-06 DIAGNOSIS — G47.34 NOCTURNAL HYPOXEMIA: ICD-10-CM

## 2024-12-06 DIAGNOSIS — G47.33 OSA (OBSTRUCTIVE SLEEP APNEA): Primary | ICD-10-CM

## 2024-12-06 ASSESSMENT — SLEEP AND FATIGUE QUESTIONNAIRES
HOW LIKELY ARE YOU TO NOD OFF OR FALL ASLEEP WHEN YOU ARE A PASSENGER IN A CAR FOR AN HOUR WITHOUT A BREAK: SLIGHT CHANCE OF DOZING
HOW LIKELY ARE YOU TO NOD OFF OR FALL ASLEEP WHILE SITTING INACTIVE IN A PUBLIC PLACE: WOULD NEVER DOZE
HOW LIKELY ARE YOU TO NOD OFF OR FALL ASLEEP WHILE SITTING AND READING: MODERATE CHANCE OF DOZING
HOW LIKELY ARE YOU TO NOD OFF OR FALL ASLEEP IN A CAR, WHILE STOPPED FOR A FEW MINUTES IN TRAFFIC: WOULD NEVER DOZE
HOW LIKELY ARE YOU TO NOD OFF OR FALL ASLEEP WHILE WATCHING TV: MODERATE CHANCE OF DOZING
ESS TOTAL SCORE: 9
HOW LIKELY ARE YOU TO NOD OFF OR FALL ASLEEP WHILE SITTING QUIETLY AFTER LUNCH WITHOUT ALCOHOL: HIGH CHANCE OF DOZING
HOW LIKELY ARE YOU TO NOD OFF OR FALL ASLEEP WHILE SITTING AND TALKING TO SOMEONE: WOULD NEVER DOZE
HOW LIKELY ARE YOU TO NOD OFF OR FALL ASLEEP WHILE LYING DOWN TO REST IN THE AFTERNOON WHEN CIRCUMSTANCES PERMIT: SLIGHT CHANCE OF DOZING

## 2024-12-06 NOTE — PROGRESS NOTES
range of motion of neck        [] Abnormal -     Neurological:        [x] No Facial Asymmetry (Cranial nerve 7 motor function) (limited exam due to video visit)          [x] No gaze palsy        [] Abnormal -         Skin:        [x] No significant exanthematous lesions or discoloration noted on facial skin         [] Abnormal -            Psychiatric:       [x] Normal Affect [] Abnormal -       [x] No Hallucinations    Other pertinent observable physical exam findings:-        ASSESSMENT:  (Medical Decision Making)         ICD-10-CM    1. MICHAEL (obstructive sleep apnea)  G47.33 DME - DURABLE MEDICAL EQUIPMENT - Patient is using, compliant and benefiting from Pap therapy. Continue current settings as AHI is down to 3.4 events per hour.        2. Nocturnal hypoxemia  G47.34 Overnight oximetry on PAP therapy was normal since her last visit.              PLAN:    Continue CPAP 4-6 cm H2O with nightly compliance  New CPAP supplies ordered  Recommendations as above  Follow-up in 1 year or sooner if needed      Orders Placed This Encounter   Procedures    DME - DURABLE MEDICAL EQUIPMENT     [facility]  Phone: [address]  [facility] [address]  [facility] [address]  Dept: [phone]      Patient Name: Josefina Hernandez  : 1945  Gender: female  Address: [address] [place] [id number]   Patient phone: [phone] (home)       Primary Insurance: Payor: [employer] MEDICARE / Plan: [employer] CHOICE-PPO MEDICARE / Product Type: *No Product type* /   Subscriber ID: [id number] - (Medicare Managed)      AMB Supply Order  Order Details     DME Location: [facility]   Order Date: 2024   The primary encounter diagnosis was MICHAEL (obstructive sleep apnea). A diagnosis of Nocturnal hypoxemia was also pertinent to this visit.             (  X   )Supplies Needed       CPAP Machine   (     ) CPAP Unit  (     ) Auto CPAP Unit  (     ) BiLevel Unit  (     ) Auto BiLevel Unit  (     )

## 2024-12-06 NOTE — PATIENT INSTRUCTIONS
Continue CPAP 4-6 cm H2O with nightly compliance  New CPAP supplies ordered  Recommendations as above  Follow-up in 1 year or sooner if needed

## 2024-12-11 DIAGNOSIS — E55.9 VITAMIN D DEFICIENCY: ICD-10-CM

## 2024-12-11 DIAGNOSIS — E03.9 ACQUIRED HYPOTHYROIDISM: ICD-10-CM

## 2024-12-11 DIAGNOSIS — E83.42 HYPOMAGNESEMIA: ICD-10-CM

## 2024-12-11 DIAGNOSIS — N18.31 STAGE 3A CHRONIC KIDNEY DISEASE (HCC): ICD-10-CM

## 2024-12-11 DIAGNOSIS — E53.8 B12 DEFICIENCY: ICD-10-CM

## 2024-12-11 DIAGNOSIS — E11.21 TYPE 2 DIABETES MELLITUS WITH NEPHROPATHY (HCC): ICD-10-CM

## 2024-12-11 LAB
25(OH)D3 SERPL-MCNC: 20.8 NG/ML (ref 30–100)
ALBUMIN SERPL-MCNC: 3.4 G/DL (ref 3.2–4.6)
ALBUMIN/GLOB SERPL: 0.8 (ref 1–1.9)
ALP SERPL-CCNC: 69 U/L (ref 35–104)
ALT SERPL-CCNC: 10 U/L (ref 8–45)
ANION GAP SERPL CALC-SCNC: 12 MMOL/L (ref 7–16)
AST SERPL-CCNC: 20 U/L (ref 15–37)
BASOPHILS # BLD: 0 K/UL (ref 0–0.2)
BASOPHILS NFR BLD: 1 % (ref 0–2)
BILIRUB SERPL-MCNC: 0.3 MG/DL (ref 0–1.2)
BUN SERPL-MCNC: 13 MG/DL (ref 8–23)
CALCIUM SERPL-MCNC: 9.2 MG/DL (ref 8.8–10.2)
CHLORIDE SERPL-SCNC: 103 MMOL/L (ref 98–107)
CO2 SERPL-SCNC: 24 MMOL/L (ref 20–29)
CREAT SERPL-MCNC: 0.95 MG/DL (ref 0.6–1.1)
DIFFERENTIAL METHOD BLD: ABNORMAL
EOSINOPHIL # BLD: 0.2 K/UL (ref 0–0.8)
EOSINOPHIL NFR BLD: 4 % (ref 0.5–7.8)
ERYTHROCYTE [DISTWIDTH] IN BLOOD BY AUTOMATED COUNT: 14 % (ref 11.9–14.6)
EST. AVERAGE GLUCOSE BLD GHB EST-MCNC: 133 MG/DL
GLOBULIN SER CALC-MCNC: 4.1 G/DL (ref 2.3–3.5)
GLUCOSE SERPL-MCNC: 123 MG/DL (ref 70–99)
HBA1C MFR BLD: 6.3 % (ref 0–5.6)
HCT VFR BLD AUTO: 37.1 % (ref 35.8–46.3)
HGB BLD-MCNC: 11.4 G/DL (ref 11.7–15.4)
IMM GRANULOCYTES # BLD AUTO: 0 K/UL (ref 0–0.5)
IMM GRANULOCYTES NFR BLD AUTO: 0 % (ref 0–5)
LYMPHOCYTES # BLD: 2.1 K/UL (ref 0.5–4.6)
LYMPHOCYTES NFR BLD: 42 % (ref 13–44)
MAGNESIUM SERPL-MCNC: 1.6 MG/DL (ref 1.8–2.4)
MCH RBC QN AUTO: 26.9 PG (ref 26.1–32.9)
MCHC RBC AUTO-ENTMCNC: 30.7 G/DL (ref 31.4–35)
MCV RBC AUTO: 87.5 FL (ref 82–102)
MONOCYTES # BLD: 0.4 K/UL (ref 0.1–1.3)
MONOCYTES NFR BLD: 9 % (ref 4–12)
NEUTS SEG # BLD: 2.2 K/UL (ref 1.7–8.2)
NEUTS SEG NFR BLD: 44 % (ref 43–78)
NRBC # BLD: 0 K/UL (ref 0–0.2)
PLATELET # BLD AUTO: 207 K/UL (ref 150–450)
PMV BLD AUTO: 12.9 FL (ref 9.4–12.3)
POTASSIUM SERPL-SCNC: 3.7 MMOL/L (ref 3.5–5.1)
PROT SERPL-MCNC: 7.5 G/DL (ref 6.3–8.2)
RBC # BLD AUTO: 4.24 M/UL (ref 4.05–5.2)
SODIUM SERPL-SCNC: 138 MMOL/L (ref 136–145)
TSH, 3RD GENERATION: 1.26 UIU/ML (ref 0.27–4.2)
VIT B12 SERPL-MCNC: 656 PG/ML (ref 193–986)
WBC # BLD AUTO: 5 K/UL (ref 4.3–11.1)

## 2024-12-22 ENCOUNTER — TELEPHONE (OUTPATIENT)
Dept: PULMONOLOGY | Age: 79
End: 2024-12-22

## 2024-12-22 DIAGNOSIS — G47.33 OSA (OBSTRUCTIVE SLEEP APNEA): Primary | ICD-10-CM

## 2025-01-13 ENCOUNTER — TELEPHONE (OUTPATIENT)
Dept: FAMILY MEDICINE CLINIC | Facility: CLINIC | Age: 80
End: 2025-01-13

## 2025-01-13 DIAGNOSIS — E11.21 TYPE 2 DIABETES MELLITUS WITH NEPHROPATHY (HCC): Primary | ICD-10-CM

## 2025-01-15 DIAGNOSIS — E83.42 HYPOMAGNESEMIA: ICD-10-CM

## 2025-01-16 RX ORDER — LANOLIN ALCOHOL/MO/W.PET/CERES
400 CREAM (GRAM) TOPICAL DAILY
Qty: 90 TABLET | Refills: 2 | Status: SHIPPED | OUTPATIENT
Start: 2025-01-16

## 2025-01-20 ENCOUNTER — OFFICE VISIT (OUTPATIENT)
Dept: FAMILY MEDICINE CLINIC | Facility: CLINIC | Age: 80
End: 2025-01-20
Payer: MEDICARE

## 2025-01-20 VITALS
WEIGHT: 156 LBS | HEART RATE: 65 BPM | OXYGEN SATURATION: 96 % | TEMPERATURE: 98.2 F | HEIGHT: 61 IN | DIASTOLIC BLOOD PRESSURE: 54 MMHG | BODY MASS INDEX: 29.45 KG/M2 | SYSTOLIC BLOOD PRESSURE: 120 MMHG

## 2025-01-20 DIAGNOSIS — I10 ESSENTIAL HYPERTENSION: ICD-10-CM

## 2025-01-20 DIAGNOSIS — E83.42 HYPOMAGNESEMIA: ICD-10-CM

## 2025-01-20 DIAGNOSIS — N18.31 STAGE 3A CHRONIC KIDNEY DISEASE (HCC): ICD-10-CM

## 2025-01-20 DIAGNOSIS — E03.9 ACQUIRED HYPOTHYROIDISM: ICD-10-CM

## 2025-01-20 DIAGNOSIS — J45.30 MILD PERSISTENT ASTHMA WITHOUT COMPLICATION: ICD-10-CM

## 2025-01-20 DIAGNOSIS — Z00.00 MEDICARE ANNUAL WELLNESS VISIT, SUBSEQUENT: Primary | ICD-10-CM

## 2025-01-20 DIAGNOSIS — E78.2 MIXED HYPERLIPIDEMIA: ICD-10-CM

## 2025-01-20 DIAGNOSIS — L65.9 HAIR LOSS: ICD-10-CM

## 2025-01-20 DIAGNOSIS — K51.00 ULCERATIVE PANCOLITIS WITHOUT COMPLICATION (HCC): ICD-10-CM

## 2025-01-20 DIAGNOSIS — J30.9 ALLERGIC RHINITIS, UNSPECIFIED SEASONALITY, UNSPECIFIED TRIGGER: ICD-10-CM

## 2025-01-20 DIAGNOSIS — R00.0 RACING HEART BEAT: ICD-10-CM

## 2025-01-20 DIAGNOSIS — E11.21 TYPE 2 DIABETES MELLITUS WITH NEPHROPATHY (HCC): ICD-10-CM

## 2025-01-20 DIAGNOSIS — E55.9 VITAMIN D DEFICIENCY: ICD-10-CM

## 2025-01-20 PROCEDURE — G8417 CALC BMI ABV UP PARAM F/U: HCPCS | Performed by: FAMILY MEDICINE

## 2025-01-20 PROCEDURE — 3074F SYST BP LT 130 MM HG: CPT | Performed by: FAMILY MEDICINE

## 2025-01-20 PROCEDURE — 99214 OFFICE O/P EST MOD 30 MIN: CPT | Performed by: FAMILY MEDICINE

## 2025-01-20 PROCEDURE — 1160F RVW MEDS BY RX/DR IN RCRD: CPT | Performed by: FAMILY MEDICINE

## 2025-01-20 PROCEDURE — 1123F ACP DISCUSS/DSCN MKR DOCD: CPT | Performed by: FAMILY MEDICINE

## 2025-01-20 PROCEDURE — 1036F TOBACCO NON-USER: CPT | Performed by: FAMILY MEDICINE

## 2025-01-20 PROCEDURE — 3078F DIAST BP <80 MM HG: CPT | Performed by: FAMILY MEDICINE

## 2025-01-20 PROCEDURE — G8427 DOCREV CUR MEDS BY ELIG CLIN: HCPCS | Performed by: FAMILY MEDICINE

## 2025-01-20 PROCEDURE — 96372 THER/PROPH/DIAG INJ SC/IM: CPT | Performed by: FAMILY MEDICINE

## 2025-01-20 PROCEDURE — G0439 PPPS, SUBSEQ VISIT: HCPCS | Performed by: FAMILY MEDICINE

## 2025-01-20 PROCEDURE — G8399 PT W/DXA RESULTS DOCUMENT: HCPCS | Performed by: FAMILY MEDICINE

## 2025-01-20 PROCEDURE — 1090F PRES/ABSN URINE INCON ASSESS: CPT | Performed by: FAMILY MEDICINE

## 2025-01-20 PROCEDURE — 1159F MED LIST DOCD IN RCRD: CPT | Performed by: FAMILY MEDICINE

## 2025-01-20 RX ORDER — LANOLIN ALCOHOL/MO/W.PET/CERES
400 CREAM (GRAM) TOPICAL DAILY
Qty: 90 TABLET | Refills: 2 | Status: SHIPPED | OUTPATIENT
Start: 2025-01-20

## 2025-01-20 RX ORDER — POTASSIUM CHLORIDE 1500 MG/1
10 TABLET, EXTENDED RELEASE ORAL 2 TIMES DAILY
Qty: 180 TABLET | Refills: 2 | Status: SHIPPED | OUTPATIENT
Start: 2025-01-20

## 2025-01-20 RX ORDER — ALBUTEROL SULFATE 90 UG/1
2 INHALANT RESPIRATORY (INHALATION) 4 TIMES DAILY PRN
Qty: 18 G | Refills: 3 | Status: SHIPPED | OUTPATIENT
Start: 2025-01-20

## 2025-01-20 RX ORDER — LEVOTHYROXINE SODIUM 50 UG/1
50 TABLET ORAL DAILY
Qty: 90 TABLET | Refills: 2 | Status: SHIPPED | OUTPATIENT
Start: 2025-01-20

## 2025-01-20 RX ORDER — CYANOCOBALAMIN 1000 UG/ML
1000 INJECTION, SOLUTION INTRAMUSCULAR; SUBCUTANEOUS ONCE
Status: COMPLETED | OUTPATIENT
Start: 2025-01-20 | End: 2025-01-20

## 2025-01-20 RX ORDER — AMLODIPINE BESYLATE 10 MG/1
10 TABLET ORAL DAILY
Qty: 90 TABLET | Refills: 2 | Status: SHIPPED | OUTPATIENT
Start: 2025-01-20

## 2025-01-20 RX ORDER — FLUTICASONE PROPIONATE 50 MCG
2 SPRAY, SUSPENSION (ML) NASAL DAILY
Qty: 16 G | Refills: 5 | Status: SHIPPED | OUTPATIENT
Start: 2025-01-20

## 2025-01-20 RX ORDER — METOPROLOL SUCCINATE 25 MG/1
12.5 TABLET, EXTENDED RELEASE ORAL DAILY
Qty: 45 TABLET | Refills: 1 | Status: SHIPPED | OUTPATIENT
Start: 2025-01-20

## 2025-01-20 RX ORDER — ROSUVASTATIN CALCIUM 10 MG/1
10 TABLET, COATED ORAL DAILY
Qty: 90 TABLET | Refills: 2 | Status: SHIPPED | OUTPATIENT
Start: 2025-01-20

## 2025-01-20 RX ORDER — ACETAMINOPHEN 160 MG
2000 TABLET,DISINTEGRATING ORAL DAILY
Qty: 90 CAPSULE | Refills: 2 | Status: SHIPPED | OUTPATIENT
Start: 2025-01-20

## 2025-01-20 RX ORDER — LOSARTAN POTASSIUM 50 MG/1
50 TABLET ORAL 2 TIMES DAILY
Qty: 90 TABLET | Refills: 2 | Status: SHIPPED | OUTPATIENT
Start: 2025-01-20

## 2025-01-20 RX ADMIN — CYANOCOBALAMIN 1000 MCG: 1000 INJECTION, SOLUTION INTRAMUSCULAR; SUBCUTANEOUS at 11:19

## 2025-01-20 SDOH — ECONOMIC STABILITY: FOOD INSECURITY: WITHIN THE PAST 12 MONTHS, THE FOOD YOU BOUGHT JUST DIDN'T LAST AND YOU DIDN'T HAVE MONEY TO GET MORE.: NEVER TRUE

## 2025-01-20 SDOH — ECONOMIC STABILITY: FOOD INSECURITY: WITHIN THE PAST 12 MONTHS, YOU WORRIED THAT YOUR FOOD WOULD RUN OUT BEFORE YOU GOT MONEY TO BUY MORE.: NEVER TRUE

## 2025-01-20 ASSESSMENT — LIFESTYLE VARIABLES
HOW MANY STANDARD DRINKS CONTAINING ALCOHOL DO YOU HAVE ON A TYPICAL DAY: PATIENT DOES NOT DRINK
HOW OFTEN DO YOU HAVE A DRINK CONTAINING ALCOHOL: NEVER

## 2025-01-20 ASSESSMENT — PATIENT HEALTH QUESTIONNAIRE - PHQ9
2. FEELING DOWN, DEPRESSED OR HOPELESS: NOT AT ALL
1. LITTLE INTEREST OR PLEASURE IN DOING THINGS: NOT AT ALL
SUM OF ALL RESPONSES TO PHQ QUESTIONS 1-9: 0
SUM OF ALL RESPONSES TO PHQ QUESTIONS 1-9: 0
SUM OF ALL RESPONSES TO PHQ9 QUESTIONS 1 & 2: 0
SUM OF ALL RESPONSES TO PHQ QUESTIONS 1-9: 0
SUM OF ALL RESPONSES TO PHQ QUESTIONS 1-9: 0

## 2025-01-20 NOTE — PROGRESS NOTES
Medicare Annual Wellness Visit    Josefina Hernandez is here for Medicare AWV    Assessment & Plan     Medicare annual wellness visit, subsequent  -updated problem list, care team, and history  -discussed recommended vaccines: flu, covid, rsv       No follow-ups on file.     Subjective       Patient's complete Health Risk Assessment and screening values have been reviewed and are found in Flowsheets. The following problems were reviewed today and where indicated follow up appointments were made and/or referrals ordered.    Positive Risk Factor Screenings with Interventions:             General HRA Questions:  Select all that apply: (!) Anger  Interventions - Anger:  See AVS for additional education material      Inactivity:  On average, how many days per week do you engage in moderate to strenuous exercise (like a brisk walk)?: 0 days (!) Abnormal  On average, how many minutes do you engage in exercise at this level?: 0 min  Interventions:  See AVS for additional education material      Dentist Screen:  Have you seen the dentist within the past year?: (!) No    Intervention:  See AVS for additional education material        Advanced Directives:  Do you have a Living Will?: (!) No    Intervention:  has NO advanced directive - not interested in additional information                     Objective   Vitals:    01/20/25 1002   BP: (!) 120/54   Pulse: 65   Temp: 98.2 °F (36.8 °C)   TempSrc: Oral   SpO2: 96%   Weight: 70.8 kg (156 lb)   Height: 1.549 m (5' 1\")      Body mass index is 29.48 kg/m².                    Allergies   Allergen Reactions    Enoxaparin Other (See Comments)     HIT+    Heparin (Porcine) Other (See Comments)     HIT    Morphine Nausea Only     Nausea and stomach pains    Sulfa Antibiotics Nausea And Vomiting     Prior to Visit Medications    Medication Sig Taking? Authorizing Provider   magnesium oxide (MAG-OX) 400 (240 Mg) MG tablet TAKE 1 TABLET BY MOUTH DAILY  Atn Olmstead MD   minoxidil

## 2025-01-29 ENCOUNTER — TELEPHONE (OUTPATIENT)
Dept: SLEEP MEDICINE | Age: 80
End: 2025-01-29

## 2025-01-29 NOTE — TELEPHONE ENCOUNTER
Patient states she is having a difficult time falling back to sleep. She is using her cpap and falls asleep fine, but wakes up and cannot go back to sleep. Please call to advise.

## 2025-02-07 ENCOUNTER — TELEPHONE (OUTPATIENT)
Dept: FAMILY MEDICINE CLINIC | Facility: CLINIC | Age: 80
End: 2025-02-07

## 2025-02-07 NOTE — TELEPHONE ENCOUNTER
Pt calls with runny nose, eyes watering, cough, SOB beginning 4 days ago. Coughs more at night, would like something sent in for cough, feels she has a cold.

## 2025-02-11 NOTE — PROGRESS NOTES
Her potassium is slightly low. I would like for her to take 2 of her potassium replacement tablets daily for now and recheck here by lab visit in 2 weeks for a BMP. The saphenous vein graft was selectively engaged and injected. Multiple views of the injected vessel were taken. SVG to OM

## 2025-02-14 NOTE — TELEPHONE ENCOUNTER
Contacted patient with provider recommendations. Patient verbalized understanding, no further questions or concerns at this time.

## 2025-04-14 ENCOUNTER — OFFICE VISIT (OUTPATIENT)
Age: 80
End: 2025-04-14
Payer: MEDICARE

## 2025-04-14 VITALS
SYSTOLIC BLOOD PRESSURE: 138 MMHG | HEART RATE: 67 BPM | BODY MASS INDEX: 29.07 KG/M2 | WEIGHT: 154 LBS | HEIGHT: 61 IN | DIASTOLIC BLOOD PRESSURE: 60 MMHG

## 2025-04-14 DIAGNOSIS — R00.2 PALPITATIONS: Primary | ICD-10-CM

## 2025-04-14 DIAGNOSIS — I10 BENIGN ESSENTIAL HTN: ICD-10-CM

## 2025-04-14 PROCEDURE — 1123F ACP DISCUSS/DSCN MKR DOCD: CPT | Performed by: INTERNAL MEDICINE

## 2025-04-14 PROCEDURE — 99214 OFFICE O/P EST MOD 30 MIN: CPT | Performed by: INTERNAL MEDICINE

## 2025-04-14 PROCEDURE — 1036F TOBACCO NON-USER: CPT | Performed by: INTERNAL MEDICINE

## 2025-04-14 PROCEDURE — 3075F SYST BP GE 130 - 139MM HG: CPT | Performed by: INTERNAL MEDICINE

## 2025-04-14 PROCEDURE — 93000 ELECTROCARDIOGRAM COMPLETE: CPT | Performed by: INTERNAL MEDICINE

## 2025-04-14 PROCEDURE — 1126F AMNT PAIN NOTED NONE PRSNT: CPT | Performed by: INTERNAL MEDICINE

## 2025-04-14 PROCEDURE — G8417 CALC BMI ABV UP PARAM F/U: HCPCS | Performed by: INTERNAL MEDICINE

## 2025-04-14 PROCEDURE — G8428 CUR MEDS NOT DOCUMENT: HCPCS | Performed by: INTERNAL MEDICINE

## 2025-04-14 PROCEDURE — G8399 PT W/DXA RESULTS DOCUMENT: HCPCS | Performed by: INTERNAL MEDICINE

## 2025-04-14 PROCEDURE — 1090F PRES/ABSN URINE INCON ASSESS: CPT | Performed by: INTERNAL MEDICINE

## 2025-04-14 PROCEDURE — 3078F DIAST BP <80 MM HG: CPT | Performed by: INTERNAL MEDICINE

## 2025-04-14 ASSESSMENT — ENCOUNTER SYMPTOMS
ABDOMINAL PAIN: 0
SHORTNESS OF BREATH: 0

## 2025-04-14 NOTE — PROGRESS NOTES
[facility] CARDIOLOGY  [address] [place] [address], [address]  [place] [address]  PHONE: [phone]      25    NAME:  Josefina Hernandez  : 1945  MRN: [id number]         SUBJECTIVE:   Josefina Hernandez is a 79 y.o. female seen for a follow up visit regarding the following:     Chief Complaint   Patient presents with    Palpitations            HPI:  Follow up  Palpitations   .    Ms. Hernandez presents today for follow-up.  She doing well and has no complaints today.  Palpitations have been well-controlled on metoprolol.  Blood pressure at goal.  Denies chest pain, shortness of breath, orthopnea, PND, palpitations, syncope or lower extremity swelling.    Palpitations   Pertinent negatives include no diaphoresis, fever or shortness of breath.           Cardiac Medications       Calcium Channel Blockers       amLODIPine (NORVASC) 10 MG tablet Take 1 tablet by mouth daily TAKE 1 TABLET BY MOUTH DAILY       Beta Blockers Cardio-Selective       metoprolol succinate (TOPROL XL) 25 MG extended release tablet Take 0.5 tablets by mouth daily       HMG CoA Reductase Inhibitors       rosuvastatin (CRESTOR) 10 MG tablet Take 1 tablet by mouth daily TAKE 1 TABLET BY MOUTH EVERY NIGHT       Angiotensin II Receptor Antagonists       losartan (COZAAR) 50 MG tablet Take 1 tablet by mouth 2 times daily          Diabetic Medications       Biguanides       metFORMIN (GLUCOPHAGE) 500 MG tablet Take 1 tablet by mouth 2 times daily (with meals)                Past Medical History, Past Surgical History, Family history, Social History, and Medications were all reviewed with the patient today and updated as necessary.     Prior to Admission medications    Medication Sig Start Date End Date Taking? Authorizing Provider   Pantoprazole Sodium (PROTONIX PO) Take by mouth as needed   Yes ProviderAntonio MD   Cyanocobalamin (VITAMIN B-12 IJ) Inject as directed   Yes Provider, Historical, MD   Misc. Devices (CPAP MACHINE) MISC

## 2025-05-19 ENCOUNTER — OFFICE VISIT (OUTPATIENT)
Dept: FAMILY MEDICINE CLINIC | Facility: CLINIC | Age: 80
End: 2025-05-19
Payer: MEDICARE

## 2025-05-19 VITALS
HEIGHT: 61 IN | OXYGEN SATURATION: 99 % | WEIGHT: 154 LBS | RESPIRATION RATE: 16 BRPM | SYSTOLIC BLOOD PRESSURE: 136 MMHG | DIASTOLIC BLOOD PRESSURE: 84 MMHG | BODY MASS INDEX: 29.07 KG/M2 | HEART RATE: 65 BPM | TEMPERATURE: 98.4 F

## 2025-05-19 DIAGNOSIS — M25.562 CHRONIC PAIN OF BOTH KNEES: ICD-10-CM

## 2025-05-19 DIAGNOSIS — M85.89 OSTEOPENIA OF MULTIPLE SITES: ICD-10-CM

## 2025-05-19 DIAGNOSIS — E53.8 B12 DEFICIENCY: Primary | ICD-10-CM

## 2025-05-19 DIAGNOSIS — E83.42 HYPOMAGNESEMIA: ICD-10-CM

## 2025-05-19 DIAGNOSIS — E55.9 VITAMIN D DEFICIENCY: ICD-10-CM

## 2025-05-19 DIAGNOSIS — N18.31 STAGE 3A CHRONIC KIDNEY DISEASE (HCC): ICD-10-CM

## 2025-05-19 DIAGNOSIS — E11.21 TYPE 2 DIABETES MELLITUS WITH NEPHROPATHY (HCC): ICD-10-CM

## 2025-05-19 DIAGNOSIS — R00.0 RACING HEART BEAT: ICD-10-CM

## 2025-05-19 DIAGNOSIS — E03.9 ACQUIRED HYPOTHYROIDISM: ICD-10-CM

## 2025-05-19 DIAGNOSIS — E78.2 MIXED HYPERLIPIDEMIA: ICD-10-CM

## 2025-05-19 DIAGNOSIS — J45.30 MILD PERSISTENT ASTHMA WITHOUT COMPLICATION: ICD-10-CM

## 2025-05-19 DIAGNOSIS — K51.00 ULCERATIVE PANCOLITIS WITHOUT COMPLICATION (HCC): ICD-10-CM

## 2025-05-19 DIAGNOSIS — G89.29 CHRONIC PAIN OF BOTH KNEES: ICD-10-CM

## 2025-05-19 DIAGNOSIS — M25.561 CHRONIC PAIN OF BOTH KNEES: ICD-10-CM

## 2025-05-19 DIAGNOSIS — R60.0 EDEMA OF LEFT LOWER EXTREMITY: ICD-10-CM

## 2025-05-19 DIAGNOSIS — J30.9 ALLERGIC RHINITIS, UNSPECIFIED SEASONALITY, UNSPECIFIED TRIGGER: ICD-10-CM

## 2025-05-19 DIAGNOSIS — I10 ESSENTIAL HYPERTENSION: ICD-10-CM

## 2025-05-19 LAB
25(OH)D3 SERPL-MCNC: 19.9 NG/ML (ref 30–100)
ALBUMIN SERPL-MCNC: 3.3 G/DL (ref 3.2–4.6)
ALBUMIN/GLOB SERPL: 0.8 (ref 1–1.9)
ALP SERPL-CCNC: 67 U/L (ref 35–104)
ALT SERPL-CCNC: 11 U/L (ref 8–45)
ANION GAP SERPL CALC-SCNC: 12 MMOL/L (ref 7–16)
AST SERPL-CCNC: 21 U/L (ref 15–37)
BILIRUB SERPL-MCNC: 0.2 MG/DL (ref 0–1.2)
BUN SERPL-MCNC: 14 MG/DL (ref 8–23)
CALCIUM SERPL-MCNC: 9.1 MG/DL (ref 8.8–10.2)
CHLORIDE SERPL-SCNC: 105 MMOL/L (ref 98–107)
CHOLEST SERPL-MCNC: 131 MG/DL (ref 0–200)
CO2 SERPL-SCNC: 24 MMOL/L (ref 20–29)
CREAT SERPL-MCNC: 0.89 MG/DL (ref 0.6–1.1)
CREAT UR-MCNC: 49.8 MG/DL (ref 28–217)
GLOBULIN SER CALC-MCNC: 4.1 G/DL (ref 2.3–3.5)
GLUCOSE SERPL-MCNC: 87 MG/DL (ref 70–99)
HBA1C MFR BLD: 5.8 %
HDLC SERPL-MCNC: 48 MG/DL (ref 40–60)
HDLC SERPL: 2.7 (ref 0–5)
LDLC SERPL CALC-MCNC: 71 MG/DL (ref 0–100)
MICROALBUMIN UR-MCNC: 1.55 MG/DL (ref 0–20)
MICROALBUMIN/CREAT UR-RTO: 31 MG/G (ref 0–30)
POTASSIUM SERPL-SCNC: 3.8 MMOL/L (ref 3.5–5.1)
PROT SERPL-MCNC: 7.4 G/DL (ref 6.3–8.2)
SODIUM SERPL-SCNC: 140 MMOL/L (ref 136–145)
TRIGL SERPL-MCNC: 60 MG/DL (ref 0–150)
TSH, 3RD GENERATION: 1.06 UIU/ML (ref 0.27–4.2)
VIT B12 SERPL-MCNC: 497 PG/ML (ref 193–986)
VLDLC SERPL CALC-MCNC: 12 MG/DL (ref 6–23)

## 2025-05-19 PROCEDURE — G8399 PT W/DXA RESULTS DOCUMENT: HCPCS | Performed by: FAMILY MEDICINE

## 2025-05-19 PROCEDURE — G8427 DOCREV CUR MEDS BY ELIG CLIN: HCPCS | Performed by: FAMILY MEDICINE

## 2025-05-19 PROCEDURE — 3075F SYST BP GE 130 - 139MM HG: CPT | Performed by: FAMILY MEDICINE

## 2025-05-19 PROCEDURE — 99214 OFFICE O/P EST MOD 30 MIN: CPT | Performed by: FAMILY MEDICINE

## 2025-05-19 PROCEDURE — G8417 CALC BMI ABV UP PARAM F/U: HCPCS | Performed by: FAMILY MEDICINE

## 2025-05-19 PROCEDURE — 83036 HEMOGLOBIN GLYCOSYLATED A1C: CPT | Performed by: FAMILY MEDICINE

## 2025-05-19 PROCEDURE — 96372 THER/PROPH/DIAG INJ SC/IM: CPT | Performed by: FAMILY MEDICINE

## 2025-05-19 PROCEDURE — 1036F TOBACCO NON-USER: CPT | Performed by: FAMILY MEDICINE

## 2025-05-19 PROCEDURE — 1123F ACP DISCUSS/DSCN MKR DOCD: CPT | Performed by: FAMILY MEDICINE

## 2025-05-19 PROCEDURE — 1160F RVW MEDS BY RX/DR IN RCRD: CPT | Performed by: FAMILY MEDICINE

## 2025-05-19 PROCEDURE — 1159F MED LIST DOCD IN RCRD: CPT | Performed by: FAMILY MEDICINE

## 2025-05-19 PROCEDURE — 1090F PRES/ABSN URINE INCON ASSESS: CPT | Performed by: FAMILY MEDICINE

## 2025-05-19 PROCEDURE — 3044F HG A1C LEVEL LT 7.0%: CPT | Performed by: FAMILY MEDICINE

## 2025-05-19 PROCEDURE — 3079F DIAST BP 80-89 MM HG: CPT | Performed by: FAMILY MEDICINE

## 2025-05-19 RX ORDER — LEVOTHYROXINE SODIUM 50 UG/1
50 TABLET ORAL DAILY
Qty: 90 TABLET | Refills: 2 | Status: SHIPPED | OUTPATIENT
Start: 2025-05-19

## 2025-05-19 RX ORDER — ROSUVASTATIN CALCIUM 10 MG/1
10 TABLET, COATED ORAL DAILY
Qty: 90 TABLET | Refills: 2 | Status: SHIPPED | OUTPATIENT
Start: 2025-05-19

## 2025-05-19 RX ORDER — POTASSIUM CHLORIDE 1500 MG/1
10 TABLET, EXTENDED RELEASE ORAL 2 TIMES DAILY
Qty: 180 TABLET | Refills: 2 | Status: SHIPPED | OUTPATIENT
Start: 2025-05-19

## 2025-05-19 RX ORDER — ALBUTEROL SULFATE 90 UG/1
2 INHALANT RESPIRATORY (INHALATION) 4 TIMES DAILY PRN
Qty: 18 G | Refills: 3 | Status: SHIPPED | OUTPATIENT
Start: 2025-05-19

## 2025-05-19 RX ORDER — LOSARTAN POTASSIUM 50 MG/1
50 TABLET ORAL 2 TIMES DAILY
Qty: 90 TABLET | Refills: 2 | Status: SHIPPED | OUTPATIENT
Start: 2025-05-19

## 2025-05-19 RX ORDER — LANOLIN ALCOHOL/MO/W.PET/CERES
400 CREAM (GRAM) TOPICAL DAILY
Qty: 90 TABLET | Refills: 2 | Status: SHIPPED | OUTPATIENT
Start: 2025-05-19

## 2025-05-19 RX ORDER — ACETAMINOPHEN 160 MG
2000 TABLET,DISINTEGRATING ORAL DAILY
Qty: 90 CAPSULE | Refills: 2 | Status: SHIPPED | OUTPATIENT
Start: 2025-05-19

## 2025-05-19 RX ORDER — FLUTICASONE PROPIONATE 50 MCG
2 SPRAY, SUSPENSION (ML) NASAL DAILY
Qty: 16 G | Refills: 5 | Status: SHIPPED | OUTPATIENT
Start: 2025-05-19

## 2025-05-19 RX ORDER — CYANOCOBALAMIN 1000 UG/ML
1000 INJECTION, SOLUTION INTRAMUSCULAR; SUBCUTANEOUS ONCE
Status: COMPLETED | OUTPATIENT
Start: 2025-05-19 | End: 2025-05-19

## 2025-05-19 RX ORDER — METOPROLOL SUCCINATE 25 MG/1
12.5 TABLET, EXTENDED RELEASE ORAL DAILY
Qty: 45 TABLET | Refills: 1 | Status: SHIPPED | OUTPATIENT
Start: 2025-05-19

## 2025-05-19 RX ORDER — AMLODIPINE BESYLATE 10 MG/1
10 TABLET ORAL DAILY
Qty: 90 TABLET | Refills: 2 | Status: SHIPPED | OUTPATIENT
Start: 2025-05-19

## 2025-05-19 RX ADMIN — CYANOCOBALAMIN 1000 MCG: 1000 INJECTION, SOLUTION INTRAMUSCULAR; SUBCUTANEOUS at 11:25

## 2025-05-19 NOTE — PROGRESS NOTES
patient presents for evaluation of ulcerative colitis, vitamin D and B12 deficiency, hair loss, hypothyroidism, left leg pain, and bilateral knee pain.    She had a consultation with her gastroenterologist in 03/2025, during which stool and blood tests were ordered. The results indicated low levels of an unspecified parameter and the presence of bowel inflammation. Despite being queried about diarrhea, she reported severe constipation. She is scheduled to receive her Stelara injection on Wednesday and has been advised to undergo a blood test prior to the administration of the medication. Her current medication regimen remains unchanged.    She continues to receive B12 injections but has not been taking oral B12 supplements. She has not received a B12 injection since her last visit in 01/2025.    She has discontinued her vitamin D supplement.    She reports experiencing hair loss, which she attributes to alopecia or potential side effects of her medications.    She has been on levothyroxine since 1978, typically taken at 6:00 AM, and is considering adjusting the timing of this medication.    She reports persistent swelling in her left leg, which has increased in size since her last visit. The pain is so severe in her knee that it impedes her ability to walk within her home. She experiences sharp pains throughout her leg, which were particularly intense on Friday and Saturday. She also reports ankle pain upon waking one morning, which rendered her unable to walk. She suspects these symptoms may be due to poor circulation, as previously diagnosed. Her cardiologist, Dr. Navarrete, examined her legs and ruled out cardiac involvement or blood clots. However, her condition has not improved, and she continues to limp when walking.    She also reports bilateral knee pain, which was particularly bothersome last week. She experiences hand stiffness and pain, which she believes may be age-related.    She is currently on

## 2025-05-20 ASSESSMENT — ENCOUNTER SYMPTOMS
CHEST TIGHTNESS: 0
SHORTNESS OF BREATH: 0
CONSTIPATION: 1

## 2025-05-21 ENCOUNTER — RESULTS FOLLOW-UP (OUTPATIENT)
Dept: FAMILY MEDICINE CLINIC | Facility: CLINIC | Age: 80
End: 2025-05-21

## 2025-06-12 ENCOUNTER — TELEMEDICINE (OUTPATIENT)
Dept: SLEEP MEDICINE | Age: 80
End: 2025-06-12
Payer: MEDICARE

## 2025-06-12 DIAGNOSIS — G47.10 HYPERSOMNIA: ICD-10-CM

## 2025-06-12 DIAGNOSIS — G47.34 NOCTURNAL HYPOXEMIA: ICD-10-CM

## 2025-06-12 DIAGNOSIS — G47.33 OSA (OBSTRUCTIVE SLEEP APNEA): Primary | ICD-10-CM

## 2025-06-12 PROCEDURE — 1160F RVW MEDS BY RX/DR IN RCRD: CPT | Performed by: NURSE PRACTITIONER

## 2025-06-12 PROCEDURE — 1090F PRES/ABSN URINE INCON ASSESS: CPT | Performed by: NURSE PRACTITIONER

## 2025-06-12 PROCEDURE — 1159F MED LIST DOCD IN RCRD: CPT | Performed by: NURSE PRACTITIONER

## 2025-06-12 PROCEDURE — G8427 DOCREV CUR MEDS BY ELIG CLIN: HCPCS | Performed by: NURSE PRACTITIONER

## 2025-06-12 PROCEDURE — G2211 COMPLEX E/M VISIT ADD ON: HCPCS | Performed by: NURSE PRACTITIONER

## 2025-06-12 PROCEDURE — G8399 PT W/DXA RESULTS DOCUMENT: HCPCS | Performed by: NURSE PRACTITIONER

## 2025-06-12 PROCEDURE — 99213 OFFICE O/P EST LOW 20 MIN: CPT | Performed by: NURSE PRACTITIONER

## 2025-06-12 PROCEDURE — 1123F ACP DISCUSS/DSCN MKR DOCD: CPT | Performed by: NURSE PRACTITIONER

## 2025-06-12 RX ORDER — VALACYCLOVIR HYDROCHLORIDE 500 MG/1
500 TABLET, FILM COATED ORAL DAILY
COMMUNITY
Start: 2025-06-04

## 2025-06-12 ASSESSMENT — SLEEP AND FATIGUE QUESTIONNAIRES
HOW LIKELY ARE YOU TO NOD OFF OR FALL ASLEEP WHEN YOU ARE A PASSENGER IN A CAR FOR AN HOUR WITHOUT A BREAK: HIGH CHANCE OF DOZING
HOW LIKELY ARE YOU TO NOD OFF OR FALL ASLEEP WHILE SITTING QUIETLY AFTER LUNCH WITHOUT ALCOHOL: SLIGHT CHANCE OF DOZING
HOW LIKELY ARE YOU TO NOD OFF OR FALL ASLEEP WHILE LYING DOWN TO REST IN THE AFTERNOON WHEN CIRCUMSTANCES PERMIT: HIGH CHANCE OF DOZING
HOW LIKELY ARE YOU TO NOD OFF OR FALL ASLEEP IN A CAR, WHILE STOPPED FOR A FEW MINUTES IN TRAFFIC: WOULD NEVER DOZE
HOW LIKELY ARE YOU TO NOD OFF OR FALL ASLEEP WHILE SITTING INACTIVE IN A PUBLIC PLACE: WOULD NEVER DOZE
ESS TOTAL SCORE: 10
HOW LIKELY ARE YOU TO NOD OFF OR FALL ASLEEP WHILE SITTING AND READING: MODERATE CHANCE OF DOZING
HOW LIKELY ARE YOU TO NOD OFF OR FALL ASLEEP WHILE WATCHING TV: SLIGHT CHANCE OF DOZING
HOW LIKELY ARE YOU TO NOD OFF OR FALL ASLEEP WHILE SITTING AND TALKING TO SOMEONE: WOULD NEVER DOZE

## 2025-06-12 NOTE — PATIENT INSTRUCTIONS
Please wear PAP therapy every time you are sleeping, including afternoon naps.  Change machine setting to nasal mask when you start using your new nasal mask.   I will send in a new order for CPAP supplies for the nasal mask. If you decide to change back to a FF mask, please let me know and [facility].  Follow up in 1 year or sooner as needed    My Options > Climate Control. Change this to manual from auto.  You will then see humidity and tube temperature under the menu.    Humidity ranges from 0-8. This will default to 4.  The lower the number, the more dry the air. The higher the number, the more moisture you'll receive in the air. Leave this on 4 for now and adjust later if needed.  Tube temperature ranges from 60-86 degrees F. This will default to 81 degrees.  The higher the number, the more dry the air.       How to Adjust Humidity Level  My Options- press the dial  Scroll the dial down to Humidity and press dial  Turn the dial to adjust the humidity level and press dial  Scroll the dial up to Home to get to main menu         The company who will be taking care of your CPAP supplies is:    Address: [address] [place] [address] [address], [place] [address]  Phone: [phone]   Fax: [phone]

## 2025-06-12 NOTE — PROGRESS NOTES
Vital Sign     Unable to Pay for Housing in the Last Year: No     Number of Times Moved in the Last Year: 0     Homeless in the Last Year: No         Family History   Problem Relation Age of Onset    Heart Disease Neg Hx     Psychiatric Disorder Other         Anxiety    Blindness Mother 96    Hypertension Mother     Diabetes Mother     Cancer Neg Hx     Breast Cancer Neg Hx          Allergies   Allergen Reactions    Enoxaparin Other (See Comments)     HIT+    Heparin (Porcine) Other (See Comments)     HIT    Morphine Nausea Only     Nausea and stomach pains    Sulfa Antibiotics Nausea And Vomiting         Current Outpatient Medications   Medication Sig    valACYclovir (VALTREX) 500 MG tablet Take 1 tablet by mouth daily    vitamin D (VITAMIN D3) 50 MCG (2000 UT) CAPS capsule Take 1 capsule by mouth daily    rosuvastatin (CRESTOR) 10 MG tablet Take 1 tablet by mouth daily TAKE 1 TABLET BY MOUTH EVERY NIGHT    potassium chloride (KLOR-CON M) 20 MEQ extended release tablet Take 0.5 tablets by mouth 2 times daily TAKE 1 TABLET BY MOUTH TWICE DAILY    metoprolol succinate (TOPROL XL) 25 MG extended release tablet Take 0.5 tablets by mouth daily    metFORMIN (GLUCOPHAGE) 500 MG tablet Take 1 tablet by mouth 2 times daily (with meals)    magnesium oxide (MAG-OX) 400 (240 Mg) MG tablet Take 1 tablet by mouth daily    losartan (COZAAR) 50 MG tablet Take 1 tablet by mouth 2 times daily    levothyroxine (SYNTHROID) 50 MCG tablet Take 1 tablet by mouth Daily TAKE 1 TABLET BY MOUTH DAILY BEFORE BREAKFAST    amLODIPine (NORVASC) 10 MG tablet Take 1 tablet by mouth daily TAKE 1 TABLET BY MOUTH DAILY    fluticasone (FLONASE) 50 MCG/ACT nasal spray 2 sprays by Nasal route daily    albuterol sulfate HFA (VENTOLIN HFA) 108 (90 Base) MCG/ACT inhaler Inhale 2 puffs into the lungs 4 times daily as needed for Wheezing    Pantoprazole Sodium (PROTONIX PO) Take by mouth as needed    Cyanocobalamin (VITAMIN B-12 IJ) Inject as directed